# Patient Record
Sex: MALE | Race: WHITE | Employment: OTHER | URBAN - METROPOLITAN AREA
[De-identification: names, ages, dates, MRNs, and addresses within clinical notes are randomized per-mention and may not be internally consistent; named-entity substitution may affect disease eponyms.]

---

## 2017-01-06 PROBLEM — N50.819 ORCHALGIA: Status: ACTIVE | Noted: 2017-01-06

## 2017-01-16 PROBLEM — N50.819 ORCHALGIA: Status: RESOLVED | Noted: 2017-01-06 | Resolved: 2017-01-16

## 2017-02-15 ENCOUNTER — HOSPITAL ENCOUNTER (OUTPATIENT)
Dept: CV DIAGNOSTICS | Facility: HOSPITAL | Age: 67
Discharge: HOME | End: 2017-02-15
Attending: INTERNAL MEDICINE
Payer: MEDICARE

## 2017-02-15 DIAGNOSIS — R60.0 LOWER LEG EDEMA: ICD-10-CM

## 2017-02-15 PROCEDURE — 93306 TTE W/DOPPLER COMPLETE: CPT

## 2017-02-15 PROCEDURE — 93306 TTE W/DOPPLER COMPLETE: CPT | Performed by: INTERNAL MEDICINE

## 2017-03-24 PROCEDURE — 36415 COLL VENOUS BLD VENIPUNCTURE: CPT | Performed by: INTERNAL MEDICINE

## 2017-03-24 PROCEDURE — 84403 ASSAY OF TOTAL TESTOSTERONE: CPT | Performed by: INTERNAL MEDICINE

## 2017-03-24 PROCEDURE — 84402 ASSAY OF FREE TESTOSTERONE: CPT | Performed by: INTERNAL MEDICINE

## 2017-03-27 PROCEDURE — 84403 ASSAY OF TOTAL TESTOSTERONE: CPT | Performed by: INTERNAL MEDICINE

## 2017-03-27 PROCEDURE — 36415 COLL VENOUS BLD VENIPUNCTURE: CPT | Performed by: INTERNAL MEDICINE

## 2017-03-27 PROCEDURE — 84402 ASSAY OF FREE TESTOSTERONE: CPT | Performed by: INTERNAL MEDICINE

## 2017-05-19 PROBLEM — H40.003 GLAUCOMA SUSPECT, BOTH EYES: Status: ACTIVE | Noted: 2017-05-19

## 2017-05-25 PROBLEM — H40.003 GLAUCOMA SUSPECT, BILATERAL: Status: ACTIVE | Noted: 2017-05-25

## 2017-07-20 ENCOUNTER — HOSPITAL ENCOUNTER (OUTPATIENT)
Dept: ULTRASOUND IMAGING | Facility: HOSPITAL | Age: 67
Discharge: HOME | End: 2017-07-20
Attending: INTERNAL MEDICINE
Payer: MEDICARE

## 2017-07-20 DIAGNOSIS — M79.89 LEFT LEG SWELLING: ICD-10-CM

## 2017-07-20 PROCEDURE — 93971 EXTREMITY STUDY: CPT | Performed by: INTERNAL MEDICINE

## 2017-07-23 NOTE — PROGRESS NOTES
Telephone Information:  Home Phone      [phone] Notified patient of result and DR recommendations. Understanding verbalized  Asking what is next step? Still feeling leg pain and swelling, \"feverish\" however no fever.   He described it as \"slow deat

## 2017-07-25 NOTE — PROGRESS NOTES
Telephone Information:  Home Phone      [phone]  Pt notified of Dr's recommendations. Pt verbalized understanding. States he saw PT and scheduled w/PCP.

## 2017-07-27 PROCEDURE — 87046 STOOL CULTR AEROBIC BACT EA: CPT | Performed by: INTERNAL MEDICINE

## 2017-07-27 PROCEDURE — 87045 FECES CULTURE AEROBIC BACT: CPT | Performed by: INTERNAL MEDICINE

## 2017-07-27 PROCEDURE — 87493 C DIFF AMPLIFIED PROBE: CPT | Performed by: INTERNAL MEDICINE

## 2017-08-15 PROBLEM — M53.3 SACROILIAC PAIN: Status: ACTIVE | Noted: 2017-08-15

## 2017-08-17 PROCEDURE — 87493 C DIFF AMPLIFIED PROBE: CPT | Performed by: INTERNAL MEDICINE

## 2018-01-25 PROCEDURE — 88305 TISSUE EXAM BY PATHOLOGIST: CPT | Performed by: INTERNAL MEDICINE

## 2018-01-26 ENCOUNTER — APPOINTMENT (OUTPATIENT)
Dept: LAB | Facility: HOSPITAL | Age: 68
End: 2018-01-26
Attending: PHYSICIAN ASSISTANT
Payer: MEDICARE

## 2018-01-26 DIAGNOSIS — J06.9 VIRAL UPPER RESPIRATORY TRACT INFECTION: ICD-10-CM

## 2018-01-26 PROCEDURE — 87999 UNLISTED MICROBIOLOGY PX: CPT

## 2018-01-26 PROCEDURE — 87798 DETECT AGENT NOS DNA AMP: CPT

## 2018-01-26 PROCEDURE — 87502 INFLUENZA DNA AMP PROBE: CPT

## 2018-02-26 PROBLEM — M25.562 CHRONIC PAIN OF LEFT KNEE: Status: ACTIVE | Noted: 2018-02-26

## 2018-02-26 PROBLEM — G89.29 CHRONIC PAIN OF LEFT KNEE: Status: ACTIVE | Noted: 2018-02-26

## 2018-03-01 PROBLEM — H40.003 GLAUCOMA SUSPECT, BOTH EYES: Status: RESOLVED | Noted: 2017-05-19 | Resolved: 2018-03-01

## 2018-03-09 PROCEDURE — 36415 COLL VENOUS BLD VENIPUNCTURE: CPT | Performed by: INTERNAL MEDICINE

## 2018-03-09 PROCEDURE — 84402 ASSAY OF FREE TESTOSTERONE: CPT | Performed by: INTERNAL MEDICINE

## 2018-03-09 PROCEDURE — 84403 ASSAY OF TOTAL TESTOSTERONE: CPT | Performed by: INTERNAL MEDICINE

## 2018-03-12 PROCEDURE — 36415 COLL VENOUS BLD VENIPUNCTURE: CPT | Performed by: INTERNAL MEDICINE

## 2018-03-12 PROCEDURE — 84403 ASSAY OF TOTAL TESTOSTERONE: CPT | Performed by: INTERNAL MEDICINE

## 2018-03-12 PROCEDURE — 84402 ASSAY OF FREE TESTOSTERONE: CPT | Performed by: INTERNAL MEDICINE

## 2018-06-06 PROBLEM — R09.82 POST-NASAL DRIP: Status: ACTIVE | Noted: 2018-06-06

## 2018-06-06 PROBLEM — J01.21 ACUTE RECURRENT ETHMOIDAL SINUSITIS: Status: ACTIVE | Noted: 2018-06-06

## 2018-06-06 PROBLEM — R51.9 FACIAL PAIN: Status: ACTIVE | Noted: 2018-06-06

## 2018-06-11 PROCEDURE — 82175 ASSAY OF ARSENIC: CPT | Performed by: INTERNAL MEDICINE

## 2018-06-11 PROCEDURE — 36415 COLL VENOUS BLD VENIPUNCTURE: CPT | Performed by: INTERNAL MEDICINE

## 2019-02-08 PROCEDURE — 82570 ASSAY OF URINE CREATININE: CPT | Performed by: INTERNAL MEDICINE

## 2019-02-08 PROCEDURE — 84403 ASSAY OF TOTAL TESTOSTERONE: CPT | Performed by: INTERNAL MEDICINE

## 2019-02-08 PROCEDURE — 82043 UR ALBUMIN QUANTITATIVE: CPT | Performed by: INTERNAL MEDICINE

## 2019-02-08 PROCEDURE — 84402 ASSAY OF FREE TESTOSTERONE: CPT | Performed by: INTERNAL MEDICINE

## 2019-02-12 PROCEDURE — 81402 MOPATH PROCEDURE LEVEL 3: CPT | Performed by: INTERNAL MEDICINE

## 2019-02-12 PROCEDURE — 82668 ASSAY OF ERYTHROPOIETIN: CPT | Performed by: INTERNAL MEDICINE

## 2019-02-12 PROCEDURE — 81270 JAK2 GENE: CPT | Performed by: INTERNAL MEDICINE

## 2019-02-12 PROCEDURE — 81219 CALR GENE COM VARIANTS: CPT | Performed by: INTERNAL MEDICINE

## 2019-06-19 ENCOUNTER — HOSPITAL ENCOUNTER (INPATIENT)
Facility: HOSPITAL | Age: 69
LOS: 9 days | Discharge: HOME | DRG: 439 | End: 2019-06-28
Attending: EMERGENCY MEDICINE | Admitting: INTERNAL MEDICINE
Payer: MEDICARE

## 2019-06-19 DIAGNOSIS — I10 HYPERTENSION, UNSPECIFIED TYPE: ICD-10-CM

## 2019-06-19 DIAGNOSIS — E87.6 HYPOKALEMIA: ICD-10-CM

## 2019-06-19 DIAGNOSIS — K85.90 ACUTE PANCREATITIS, UNSPECIFIED COMPLICATION STATUS, UNSPECIFIED PANCREATITIS TYPE: Primary | ICD-10-CM

## 2019-06-19 DIAGNOSIS — D72.829 LEUKOCYTOSIS, UNSPECIFIED TYPE: ICD-10-CM

## 2019-06-19 PROBLEM — R73.9 HYPERGLYCEMIA: Status: ACTIVE | Noted: 2019-06-19

## 2019-06-19 LAB
ALBUMIN SERPL-MCNC: 3.8 G/DL (ref 3.4–5)
ALBUMIN/GLOB SERPL: 1.2 {RATIO} (ref 1–2)
ALP LIVER SERPL-CCNC: 83 U/L (ref 45–117)
ALT SERPL-CCNC: 46 U/L (ref 16–61)
ANION GAP SERPL CALC-SCNC: 8 MMOL/L (ref 0–18)
AST SERPL-CCNC: 19 U/L (ref 15–37)
BASOPHILS # BLD AUTO: 0.03 X10(3) UL (ref 0–0.2)
BASOPHILS NFR BLD AUTO: 0.2 %
BILIRUB SERPL-MCNC: 0.7 MG/DL (ref 0.1–2)
BILIRUB UR QL STRIP.AUTO: NEGATIVE
BILIRUB UR QL STRIP.AUTO: NEGATIVE
BUN BLD-MCNC: 10 MG/DL (ref 7–18)
BUN/CREAT SERPL: 10.9 (ref 10–20)
CALCIUM BLD-MCNC: 8.7 MG/DL (ref 8.5–10.1)
CHLORIDE SERPL-SCNC: 100 MMOL/L (ref 98–112)
CLARITY UR REFRACT.AUTO: CLEAR
CLARITY UR REFRACT.AUTO: CLEAR
CO2 SERPL-SCNC: 29 MMOL/L (ref 21–32)
CREAT BLD-MCNC: 0.92 MG/DL (ref 0.7–1.3)
DEPRECATED RDW RBC AUTO: 40.9 FL (ref 35.1–46.3)
EOSINOPHIL # BLD AUTO: 0.01 X10(3) UL (ref 0–0.7)
EOSINOPHIL NFR BLD AUTO: 0.1 %
ERYTHROCYTE [DISTWIDTH] IN BLOOD BY AUTOMATED COUNT: 13.2 % (ref 11–15)
GLOBULIN PLAS-MCNC: 3.1 G/DL (ref 2.8–4.4)
GLUCOSE BLD-MCNC: 121 MG/DL (ref 70–99)
GLUCOSE BLD-MCNC: 140 MG/DL (ref 70–99)
GLUCOSE UR STRIP.AUTO-MCNC: NEGATIVE MG/DL
GLUCOSE UR STRIP.AUTO-MCNC: NEGATIVE MG/DL
HCT VFR BLD AUTO: 47.1 % (ref 39–53)
HGB BLD-MCNC: 15.5 G/DL (ref 13–17.5)
IMM GRANULOCYTES # BLD AUTO: 0.08 X10(3) UL (ref 0–1)
IMM GRANULOCYTES NFR BLD: 0.5 %
LEUKOCYTE ESTERASE UR QL STRIP.AUTO: NEGATIVE
LEUKOCYTE ESTERASE UR QL STRIP.AUTO: NEGATIVE
LIPASE SERPL-CCNC: 494 U/L (ref 73–393)
LYMPHOCYTES # BLD AUTO: 1.06 X10(3) UL (ref 1–4)
LYMPHOCYTES NFR BLD AUTO: 6.1 %
M PROTEIN MFR SERPL ELPH: 6.9 G/DL (ref 6.4–8.2)
MCH RBC QN AUTO: 28.6 PG (ref 26–34)
MCHC RBC AUTO-ENTMCNC: 32.9 G/DL (ref 31–37)
MCV RBC AUTO: 86.9 FL (ref 80–100)
MONOCYTES # BLD AUTO: 1.44 X10(3) UL (ref 0.1–1)
MONOCYTES NFR BLD AUTO: 8.3 %
NEUTROPHILS # BLD AUTO: 14.78 X10 (3) UL (ref 1.5–7.7)
NEUTROPHILS # BLD AUTO: 14.78 X10(3) UL (ref 1.5–7.7)
NEUTROPHILS NFR BLD AUTO: 84.8 %
NITRITE UR QL STRIP.AUTO: NEGATIVE
NITRITE UR QL STRIP.AUTO: NEGATIVE
OSMOLALITY SERPL CALC.SUM OF ELEC: 285 MOSM/KG (ref 275–295)
PH UR STRIP.AUTO: 7 [PH] (ref 4.5–8)
PH UR STRIP.AUTO: 8 [PH] (ref 4.5–8)
PLATELET # BLD AUTO: 200 10(3)UL (ref 150–450)
POTASSIUM SERPL-SCNC: 3.5 MMOL/L (ref 3.5–5.1)
PROT UR STRIP.AUTO-MCNC: NEGATIVE MG/DL
PROT UR STRIP.AUTO-MCNC: NEGATIVE MG/DL
RBC # BLD AUTO: 5.42 X10(6)UL (ref 3.8–5.8)
SODIUM SERPL-SCNC: 137 MMOL/L (ref 136–145)
SP GR UR STRIP.AUTO: 1.01 (ref 1–1.03)
SP GR UR STRIP.AUTO: 1.02 (ref 1–1.03)
UROBILINOGEN UR STRIP.AUTO-MCNC: <2 MG/DL
UROBILINOGEN UR STRIP.AUTO-MCNC: <2 MG/DL
WBC # BLD AUTO: 17.4 X10(3) UL (ref 4–11)

## 2019-06-19 PROCEDURE — 99285 EMERGENCY DEPT VISIT HI MDM: CPT

## 2019-06-19 PROCEDURE — 83690 ASSAY OF LIPASE: CPT | Performed by: PHYSICIAN ASSISTANT

## 2019-06-19 PROCEDURE — 96375 TX/PRO/DX INJ NEW DRUG ADDON: CPT

## 2019-06-19 PROCEDURE — 36415 COLL VENOUS BLD VENIPUNCTURE: CPT

## 2019-06-19 PROCEDURE — 85025 COMPLETE CBC W/AUTO DIFF WBC: CPT | Performed by: PHYSICIAN ASSISTANT

## 2019-06-19 PROCEDURE — 81001 URINALYSIS AUTO W/SCOPE: CPT

## 2019-06-19 PROCEDURE — 87040 BLOOD CULTURE FOR BACTERIA: CPT | Performed by: EMERGENCY MEDICINE

## 2019-06-19 PROCEDURE — 81001 URINALYSIS AUTO W/SCOPE: CPT | Performed by: EMERGENCY MEDICINE

## 2019-06-19 PROCEDURE — 80053 COMPREHEN METABOLIC PANEL: CPT | Performed by: PHYSICIAN ASSISTANT

## 2019-06-19 PROCEDURE — 96361 HYDRATE IV INFUSION ADD-ON: CPT

## 2019-06-19 PROCEDURE — 96374 THER/PROPH/DIAG INJ IV PUSH: CPT

## 2019-06-19 PROCEDURE — 82962 GLUCOSE BLOOD TEST: CPT

## 2019-06-19 RX ORDER — METOCLOPRAMIDE HYDROCHLORIDE 5 MG/ML
10 INJECTION INTRAMUSCULAR; INTRAVENOUS EVERY 8 HOURS PRN
Status: DISCONTINUED | OUTPATIENT
Start: 2019-06-19 | End: 2019-06-28

## 2019-06-19 RX ORDER — SODIUM CHLORIDE 9 MG/ML
INJECTION, SOLUTION INTRAVENOUS CONTINUOUS
Status: ACTIVE | OUTPATIENT
Start: 2019-06-20 | End: 2019-06-20

## 2019-06-19 RX ORDER — HYDROMORPHONE HYDROCHLORIDE 1 MG/ML
0.5 INJECTION, SOLUTION INTRAMUSCULAR; INTRAVENOUS; SUBCUTANEOUS ONCE
Status: COMPLETED | OUTPATIENT
Start: 2019-06-19 | End: 2019-06-19

## 2019-06-19 RX ORDER — MORPHINE SULFATE 4 MG/ML
2 INJECTION, SOLUTION INTRAMUSCULAR; INTRAVENOUS EVERY 2 HOUR PRN
Status: DISCONTINUED | OUTPATIENT
Start: 2019-06-19 | End: 2019-06-22

## 2019-06-19 RX ORDER — HEPARIN SODIUM 5000 [USP'U]/ML
7500 INJECTION, SOLUTION INTRAVENOUS; SUBCUTANEOUS EVERY 8 HOURS SCHEDULED
Status: DISCONTINUED | OUTPATIENT
Start: 2019-06-20 | End: 2019-06-28

## 2019-06-19 RX ORDER — LABETALOL HYDROCHLORIDE 5 MG/ML
20 INJECTION, SOLUTION INTRAVENOUS ONCE
Status: COMPLETED | OUTPATIENT
Start: 2019-06-19 | End: 2019-06-19

## 2019-06-19 RX ORDER — ONDANSETRON 2 MG/ML
4 INJECTION INTRAMUSCULAR; INTRAVENOUS EVERY 6 HOURS PRN
Status: DISCONTINUED | OUTPATIENT
Start: 2019-06-19 | End: 2019-06-28

## 2019-06-19 RX ORDER — MORPHINE SULFATE 4 MG/ML
1 INJECTION, SOLUTION INTRAMUSCULAR; INTRAVENOUS EVERY 2 HOUR PRN
Status: DISCONTINUED | OUTPATIENT
Start: 2019-06-19 | End: 2019-06-22

## 2019-06-19 RX ORDER — ONDANSETRON 2 MG/ML
4 INJECTION INTRAMUSCULAR; INTRAVENOUS ONCE
Status: COMPLETED | OUTPATIENT
Start: 2019-06-19 | End: 2019-06-19

## 2019-06-19 RX ORDER — SODIUM CHLORIDE, SODIUM LACTATE, POTASSIUM CHLORIDE, CALCIUM CHLORIDE 600; 310; 30; 20 MG/100ML; MG/100ML; MG/100ML; MG/100ML
INJECTION, SOLUTION INTRAVENOUS CONTINUOUS
Status: DISCONTINUED | OUTPATIENT
Start: 2019-06-20 | End: 2019-06-23

## 2019-06-19 RX ORDER — MORPHINE SULFATE 4 MG/ML
4 INJECTION, SOLUTION INTRAMUSCULAR; INTRAVENOUS EVERY 2 HOUR PRN
Status: DISCONTINUED | OUTPATIENT
Start: 2019-06-19 | End: 2019-06-22

## 2019-06-20 ENCOUNTER — APPOINTMENT (OUTPATIENT)
Dept: MRI IMAGING | Facility: HOSPITAL | Age: 69
DRG: 439 | End: 2019-06-20
Attending: INTERNAL MEDICINE
Payer: MEDICARE

## 2019-06-20 LAB
ALBUMIN SERPL-MCNC: 3.2 G/DL (ref 3.4–5)
ALBUMIN/GLOB SERPL: 1.1 {RATIO} (ref 1–2)
ALP LIVER SERPL-CCNC: 68 U/L (ref 45–117)
ALT SERPL-CCNC: 36 U/L (ref 16–61)
ANION GAP SERPL CALC-SCNC: 6 MMOL/L (ref 0–18)
AST SERPL-CCNC: 17 U/L (ref 15–37)
BASOPHILS # BLD AUTO: 0.01 X10(3) UL (ref 0–0.2)
BASOPHILS NFR BLD AUTO: 0.1 %
BILIRUB SERPL-MCNC: 0.8 MG/DL (ref 0.1–2)
BUN BLD-MCNC: 9 MG/DL (ref 7–18)
BUN/CREAT SERPL: 9.9 (ref 10–20)
CALCIUM BLD-MCNC: 8.3 MG/DL (ref 8.5–10.1)
CHLORIDE SERPL-SCNC: 104 MMOL/L (ref 98–112)
CHOLEST SMN-MCNC: 140 MG/DL (ref ?–200)
CO2 SERPL-SCNC: 28 MMOL/L (ref 21–32)
CREAT BLD-MCNC: 0.91 MG/DL (ref 0.7–1.3)
DEPRECATED RDW RBC AUTO: 42 FL (ref 35.1–46.3)
EOSINOPHIL # BLD AUTO: 0.01 X10(3) UL (ref 0–0.7)
EOSINOPHIL NFR BLD AUTO: 0.1 %
ERYTHROCYTE [DISTWIDTH] IN BLOOD BY AUTOMATED COUNT: 13.3 % (ref 11–15)
GLOBULIN PLAS-MCNC: 2.9 G/DL (ref 2.8–4.4)
GLUCOSE BLD-MCNC: 118 MG/DL (ref 70–99)
GLUCOSE BLD-MCNC: 143 MG/DL (ref 70–99)
GLUCOSE BLD-MCNC: 143 MG/DL (ref 70–99)
GLUCOSE BLD-MCNC: 156 MG/DL (ref 70–99)
GLUCOSE BLD-MCNC: 156 MG/DL (ref 70–99)
HCT VFR BLD AUTO: 44.2 % (ref 39–53)
HDLC SERPL-MCNC: 37 MG/DL (ref 40–59)
HGB BLD-MCNC: 14.5 G/DL (ref 13–17.5)
IMM GRANULOCYTES # BLD AUTO: 0.07 X10(3) UL (ref 0–1)
IMM GRANULOCYTES NFR BLD: 0.4 %
LDLC SERPL CALC-MCNC: 88 MG/DL (ref ?–100)
LIPASE SERPL-CCNC: 306 U/L (ref 73–393)
LYMPHOCYTES # BLD AUTO: 1.11 X10(3) UL (ref 1–4)
LYMPHOCYTES NFR BLD AUTO: 6.6 %
M PROTEIN MFR SERPL ELPH: 6.1 G/DL (ref 6.4–8.2)
MCH RBC QN AUTO: 28.5 PG (ref 26–34)
MCHC RBC AUTO-ENTMCNC: 32.8 G/DL (ref 31–37)
MCV RBC AUTO: 87 FL (ref 80–100)
MONOCYTES # BLD AUTO: 1.55 X10(3) UL (ref 0.1–1)
MONOCYTES NFR BLD AUTO: 9.2 %
NEUTROPHILS # BLD AUTO: 14.09 X10 (3) UL (ref 1.5–7.7)
NEUTROPHILS # BLD AUTO: 14.09 X10(3) UL (ref 1.5–7.7)
NEUTROPHILS NFR BLD AUTO: 83.6 %
NONHDLC SERPL-MCNC: 103 MG/DL (ref ?–130)
OSMOLALITY SERPL CALC.SUM OF ELEC: 287 MOSM/KG (ref 275–295)
PLATELET # BLD AUTO: 182 10(3)UL (ref 150–450)
POTASSIUM SERPL-SCNC: 3.6 MMOL/L (ref 3.5–5.1)
POTASSIUM SERPL-SCNC: 4 MMOL/L (ref 3.5–5.1)
RBC # BLD AUTO: 5.08 X10(6)UL (ref 3.8–5.8)
SODIUM SERPL-SCNC: 138 MMOL/L (ref 136–145)
TRIGL SERPL-MCNC: 77 MG/DL (ref 30–149)
VLDLC SERPL CALC-MCNC: 15 MG/DL (ref 0–30)
WBC # BLD AUTO: 16.8 X10(3) UL (ref 4–11)

## 2019-06-20 PROCEDURE — 80053 COMPREHEN METABOLIC PANEL: CPT | Performed by: INTERNAL MEDICINE

## 2019-06-20 PROCEDURE — 83690 ASSAY OF LIPASE: CPT | Performed by: INTERNAL MEDICINE

## 2019-06-20 PROCEDURE — 74181 MRI ABDOMEN W/O CONTRAST: CPT | Performed by: INTERNAL MEDICINE

## 2019-06-20 PROCEDURE — 80061 LIPID PANEL: CPT | Performed by: INTERNAL MEDICINE

## 2019-06-20 PROCEDURE — 76376 3D RENDER W/INTRP POSTPROCES: CPT | Performed by: INTERNAL MEDICINE

## 2019-06-20 PROCEDURE — 85025 COMPLETE CBC W/AUTO DIFF WBC: CPT | Performed by: INTERNAL MEDICINE

## 2019-06-20 PROCEDURE — 82962 GLUCOSE BLOOD TEST: CPT

## 2019-06-20 PROCEDURE — 84132 ASSAY OF SERUM POTASSIUM: CPT | Performed by: HOSPITALIST

## 2019-06-20 RX ORDER — ASPIRIN 81 MG/1
81 TABLET ORAL 2 TIMES DAILY
Status: DISCONTINUED | OUTPATIENT
Start: 2019-06-20 | End: 2019-06-28

## 2019-06-20 RX ORDER — CLONIDINE HYDROCHLORIDE 0.1 MG/1
0.2 TABLET ORAL 2 TIMES DAILY
Status: DISCONTINUED | OUTPATIENT
Start: 2019-06-20 | End: 2019-06-28

## 2019-06-20 RX ORDER — DEXTROSE MONOHYDRATE 25 G/50ML
50 INJECTION, SOLUTION INTRAVENOUS
Status: DISCONTINUED | OUTPATIENT
Start: 2019-06-20 | End: 2019-06-28

## 2019-06-20 RX ORDER — POTASSIUM CHLORIDE 20 MEQ/1
40 TABLET, EXTENDED RELEASE ORAL EVERY 4 HOURS
Status: COMPLETED | OUTPATIENT
Start: 2019-06-20 | End: 2019-06-20

## 2019-06-20 RX ORDER — CARVEDILOL 12.5 MG/1
12.5 TABLET ORAL 3 TIMES DAILY
Status: DISCONTINUED | OUTPATIENT
Start: 2019-06-20 | End: 2019-06-28

## 2019-06-20 RX ORDER — ALPRAZOLAM 0.25 MG/1
0.25 TABLET ORAL 3 TIMES DAILY PRN
Status: DISCONTINUED | OUTPATIENT
Start: 2019-06-20 | End: 2019-06-28

## 2019-06-20 RX ORDER — LISINOPRIL 10 MG/1
10 TABLET ORAL NIGHTLY
Status: DISCONTINUED | OUTPATIENT
Start: 2019-06-20 | End: 2019-06-25

## 2019-06-20 RX ORDER — LISINOPRIL 20 MG/1
20 TABLET ORAL EVERY MORNING
Status: DISCONTINUED | OUTPATIENT
Start: 2019-06-20 | End: 2019-06-28

## 2019-06-20 RX ORDER — MAGNESIUM OXIDE 400 MG (241.3 MG MAGNESIUM) TABLET
400 TABLET ONCE
Status: COMPLETED | OUTPATIENT
Start: 2019-06-20 | End: 2019-06-20

## 2019-06-20 NOTE — H&P
[facility] Hospitalist H&P       CC: abdominal pain    PCP: Cierra Pinto MD    History of Present Illness: Pt is a 72 yo with mmp including but not limited to CAD, HTN/HL, asthma, GERD, is admitted for abdominal pain for past few days.   CT imagin 1/30/2015    Performed by Charyl Hatchet, MD at [address] Bilateral 5/26/2015    Performed by Eddie Mcclellan DO at [address] [facility] Bilateral 7/2007, 10/2006 20 mg in the AM = 30 mg daily (Patient taking differently: Take 20 mg by mouth every morning.  One tab in the PM - taking with 20 mg in the AM = 30 mg daily ) Disp: 90 tablet Rfl: 3   MetFORMIN HCl 500 MG Oral Tab Take 1 tablet (500 mg total) by mouth 2 (tw distended, no overt hernias   Extremities: Extremities normal, atraumatic, +2 LE edema (chronic lymphedema per pt) with venous stasis changes   Skin: Skin color, texture, turgor normal. No visible rashes  Except listed above   Neurologic:  Psychiatric: No meds    **asthma, GERD  -no acute issues, continue any home meds    **PPx-scds, heparin subq    Outpatient records or previous hospital records reviewed. Further recommendations pending patient's clinical course.   [facility] hospitalist to continue to follow

## 2019-06-20 NOTE — H&P (VIEW-ONLY)
[address] Patient Status:  Inpatient   Date of Birth 1/3/1950 MRN [id number]   [facility] 4NW-A Attending Denzel Roblero MD   Hosp Day # 1 PCP Emilio Grewal MD     Date of Co in the PM - taking with 20 mg in the AM = 30 mg daily , Start Date 12/20/18, End Date , Taking?  Yes, Authorizing Provider Timmy Medrano MD    Medication MetFORMIN HCl 500 MG Oral Tab, Sig Take 1 tablet (500 mg total) by mouth 2 (two) times daily MG Oral Tab, Sig 1/2 tablet  3 times daily prn, Start Date 12/20/18, End Date , Taking? , Authorizing Provider Felix Taylor MD    Medication Cetirizine HCl (ZYRTEC ALLERGY) 10 MG Oral Cap, Sig Take by mouth daily as needed.  , Start Date , End D HCl (REGLAN) injection 10 mg 10 mg Intravenous Q8H PRN   morphINE sulfate (PF) 4 MG/ML injection 1 mg 1 mg Intravenous Q2H PRN   Or      morphINE sulfate (PF) 4 MG/ML injection 2 mg 2 mg Intravenous Q2H PRN   Or      morphINE sulfate (PF) 4 MG/ML injection unspecified  TECHNIQUE:  Routine helical scanning was performed through the abdomen and pelvis  after  administration of iodinated contrast .  IV CONTRAST: 80 cc    Automated exposure control and ALARA manual techniques for patient specific dose reduction of the chest in one year recommended. 3. Mild pelvic lymphadenopathy. Differential diagnosis includes benign lymphoid hyperplasia and  lymphoproliferative disorders. Follow-up CT scan of the pelvis in 3 months recommended. Assessment:    1.  Mild acute

## 2019-06-20 NOTE — CONSULTS
[address] Patient Status:  Inpatient   Date of Birth 1/3/1950 MRN [id number]   [facility] 4NW-A Attending Yolanda Thomas MD   Hosp Day # 1 PCP Sunny Craig MD     Date of Co in the PM - taking with 20 mg in the AM = 30 mg daily , Start Date 12/20/18, End Date , Taking?  Yes, Authorizing Provider Lakesha Zimmer MD    Medication MetFORMIN HCl 500 MG Oral Tab, Sig Take 1 tablet (500 mg total) by mouth 2 (two) times daily MG Oral Tab, Sig 1/2 tablet  3 times daily prn, Start Date 12/20/18, End Date , Taking? , Authorizing Provider Lakesha Zimmer MD    Medication Cetirizine HCl (ZYRTEC ALLERGY) 10 MG Oral Cap, Sig Take by mouth daily as needed.  , Start Date , End D HCl (REGLAN) injection 10 mg 10 mg Intravenous Q8H PRN   morphINE sulfate (PF) 4 MG/ML injection 1 mg 1 mg Intravenous Q2H PRN   Or      morphINE sulfate (PF) 4 MG/ML injection 2 mg 2 mg Intravenous Q2H PRN   Or      morphINE sulfate (PF) 4 MG/ML injection unspecified  TECHNIQUE:  Routine helical scanning was performed through the abdomen and pelvis  after  administration of iodinated contrast .  IV CONTRAST: 80 cc    Automated exposure control and ALARA manual techniques for patient specific dose reduction of the chest in one year recommended. 3. Mild pelvic lymphadenopathy. Differential diagnosis includes benign lymphoid hyperplasia and  lymphoproliferative disorders. Follow-up CT scan of the pelvis in 3 months recommended. Assessment:    1.  Mild acute
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
## 2019-06-20 NOTE — PROGRESS NOTES
Assumed care at 0730;awake and alert x 4; seeing pt et time;new orders written;MRI screening form completed;  0856-transport here now to take him down for MRI;given morphine for c/o pain 6/10  1426-paging dr Madelyn Trejo to inform mri result  3118--

## 2019-06-20 NOTE — PROGRESS NOTES
[facility] Progress Note:  Anticoagulation Weight Dose Adjustment for heparin    Tran Ritter is a 71year old male who has been prescribed heparin for VTE prophylaxis. Estimated Creatinine Clearance: 65.9 mL/min (based on SCr of 0.92 mg/dL).     Wt

## 2019-06-20 NOTE — PROGRESS NOTES
NURSING ADMISSION NOTE      Patient admitted via Cart from ER due to pancreatitis,  Oriented to room. Safety precautions initiated. Bed in low position. Call light in reach. patient instructed strict NPO.   C/o abd.pain scale 5/10, nausea is better, abd.

## 2019-06-21 LAB
BASOPHILS # BLD AUTO: 0.02 X10(3) UL (ref 0–0.2)
BASOPHILS NFR BLD AUTO: 0.1 %
DEPRECATED RDW RBC AUTO: 43.6 FL (ref 35.1–46.3)
EOSINOPHIL # BLD AUTO: 0.05 X10(3) UL (ref 0–0.7)
EOSINOPHIL NFR BLD AUTO: 0.3 %
ERYTHROCYTE [DISTWIDTH] IN BLOOD BY AUTOMATED COUNT: 13.7 % (ref 11–15)
GLUCOSE BLD-MCNC: 129 MG/DL (ref 70–99)
GLUCOSE BLD-MCNC: 137 MG/DL (ref 70–99)
GLUCOSE BLD-MCNC: 138 MG/DL (ref 70–99)
GLUCOSE BLD-MCNC: 153 MG/DL (ref 70–99)
HCT VFR BLD AUTO: 42.9 % (ref 39–53)
HGB BLD-MCNC: 13.8 G/DL (ref 13–17.5)
IMM GRANULOCYTES # BLD AUTO: 0.1 X10(3) UL (ref 0–1)
IMM GRANULOCYTES NFR BLD: 0.6 %
LYMPHOCYTES # BLD AUTO: 0.92 X10(3) UL (ref 1–4)
LYMPHOCYTES NFR BLD AUTO: 5.9 %
MCH RBC QN AUTO: 28.3 PG (ref 26–34)
MCHC RBC AUTO-ENTMCNC: 32.2 G/DL (ref 31–37)
MCV RBC AUTO: 87.9 FL (ref 80–100)
MONOCYTES # BLD AUTO: 1.48 X10(3) UL (ref 0.1–1)
MONOCYTES NFR BLD AUTO: 9.6 %
NEUTROPHILS # BLD AUTO: 12.92 X10 (3) UL (ref 1.5–7.7)
NEUTROPHILS # BLD AUTO: 12.92 X10(3) UL (ref 1.5–7.7)
NEUTROPHILS NFR BLD AUTO: 83.5 %
PLATELET # BLD AUTO: 165 10(3)UL (ref 150–450)
RBC # BLD AUTO: 4.88 X10(6)UL (ref 3.8–5.8)
WBC # BLD AUTO: 15.5 X10(3) UL (ref 4–11)

## 2019-06-21 PROCEDURE — 85025 COMPLETE CBC W/AUTO DIFF WBC: CPT | Performed by: HOSPITALIST

## 2019-06-21 PROCEDURE — 82962 GLUCOSE BLOOD TEST: CPT

## 2019-06-21 RX ORDER — BISACODYL 10 MG
10 SUPPOSITORY, RECTAL RECTAL
Status: DISCONTINUED | OUTPATIENT
Start: 2019-06-21 | End: 2019-06-28

## 2019-06-21 RX ORDER — DOCUSATE SODIUM 100 MG/1
100 CAPSULE, LIQUID FILLED ORAL 2 TIMES DAILY
Status: DISCONTINUED | OUTPATIENT
Start: 2019-06-21 | End: 2019-06-28

## 2019-06-21 RX ORDER — POLYETHYLENE GLYCOL 3350 17 G/17G
17 POWDER, FOR SOLUTION ORAL DAILY PRN
Status: DISCONTINUED | OUTPATIENT
Start: 2019-06-21 | End: 2019-06-28

## 2019-06-21 NOTE — PLAN OF CARE
Pt. A&O x4. VSS. Pt. C/o pain; PRN morphine given per MAR. Pt. C/o nausea; PRN zofran given per [facility] with effective relief. QID accucheck; insulin given per MAR. Seizure precautions in place, no seizure activity noted. Call light within reach.  Will continue balance  Outcome: Progressing

## 2019-06-21 NOTE — PROGRESS NOTES
C/o constipation;passing gas;attempted to go 3x last nite no result;paging hospitalist to inform for orders; rounded at 0730;  Colace given as ordered;no result yet;paged hospitalist and notified;new order for miralax;given et time;

## 2019-06-21 NOTE — PROGRESS NOTES
[facility]  GI Progress Note      Elida Estrada Patient Status:  Inpatient    1/3/1950 MRN [id number]   [facility] 4NW-A Attending Maria Teresa Kiran, *   Hosp Day # 2 PCP Marie Singh MD          SUBJECTIVE:     Still hav cysts including a large exophytic right superior pole renal cyst measuring 11.6 cm. No solid renal lesions or evidence of hydronephrosis. ADRENALS:  No mass or enlargement. AORTA/VASCULAR:  No aneurysm or dissection.     RETROPERITONEUM:  No mass or ad

## 2019-06-21 NOTE — PROGRESS NOTES
[facility] Hospitalist Progress Note     PCP: Samir Anaya MD    CC:  Follow up    SUBJECTIVE:  Sitting up in bed, +epigastric pain. Tolerated diet yesterday but says pain worsened after that. No n/v/f/c.  Required IV morphine this AM    OBJECTIVE: lisinopril  20 mg Oral QAM   • Insulin Aspart Pen  1-5 Units Subcutaneous TID CC and HS   • lisinopril  10 mg Oral Nightly   • Heparin Sodium (Porcine)  7,500 Units Subcutaneous Q8H [address]     • lactated ringers 125 mL/hr at 06/21/19 0624     ALPRAZolam, gluc

## 2019-06-22 ENCOUNTER — APPOINTMENT (OUTPATIENT)
Dept: GENERAL RADIOLOGY | Facility: HOSPITAL | Age: 69
DRG: 439 | End: 2019-06-22
Attending: INTERNAL MEDICINE
Payer: MEDICARE

## 2019-06-22 LAB
BASOPHILS # BLD AUTO: 0.02 X10(3) UL (ref 0–0.2)
BASOPHILS NFR BLD AUTO: 0.2 %
DEPRECATED RDW RBC AUTO: 43.7 FL (ref 35.1–46.3)
EOSINOPHIL # BLD AUTO: 0.14 X10(3) UL (ref 0–0.7)
EOSINOPHIL NFR BLD AUTO: 1.4 %
ERYTHROCYTE [DISTWIDTH] IN BLOOD BY AUTOMATED COUNT: 13.3 % (ref 11–15)
GLUCOSE BLD-MCNC: 107 MG/DL (ref 70–99)
GLUCOSE BLD-MCNC: 109 MG/DL (ref 70–99)
GLUCOSE BLD-MCNC: 178 MG/DL (ref 70–99)
GLUCOSE BLD-MCNC: 192 MG/DL (ref 70–99)
HCT VFR BLD AUTO: 42.5 % (ref 39–53)
HGB BLD-MCNC: 13.8 G/DL (ref 13–17.5)
IMM GRANULOCYTES # BLD AUTO: 0.07 X10(3) UL (ref 0–1)
IMM GRANULOCYTES NFR BLD: 0.7 %
LYMPHOCYTES # BLD AUTO: 1.03 X10(3) UL (ref 1–4)
LYMPHOCYTES NFR BLD AUTO: 10 %
MCH RBC QN AUTO: 28.9 PG (ref 26–34)
MCHC RBC AUTO-ENTMCNC: 32.5 G/DL (ref 31–37)
MCV RBC AUTO: 89.1 FL (ref 80–100)
MONOCYTES # BLD AUTO: 1.07 X10(3) UL (ref 0.1–1)
MONOCYTES NFR BLD AUTO: 10.4 %
NEUTROPHILS # BLD AUTO: 7.99 X10 (3) UL (ref 1.5–7.7)
NEUTROPHILS # BLD AUTO: 7.99 X10(3) UL (ref 1.5–7.7)
NEUTROPHILS NFR BLD AUTO: 77.3 %
PLATELET # BLD AUTO: 179 10(3)UL (ref 150–450)
RBC # BLD AUTO: 4.77 X10(6)UL (ref 3.8–5.8)
WBC # BLD AUTO: 10.3 X10(3) UL (ref 4–11)

## 2019-06-22 PROCEDURE — 99152 MOD SED SAME PHYS/QHP 5/>YRS: CPT | Performed by: INTERNAL MEDICINE

## 2019-06-22 PROCEDURE — 82962 GLUCOSE BLOOD TEST: CPT

## 2019-06-22 PROCEDURE — 88305 TISSUE EXAM BY PATHOLOGIST: CPT | Performed by: INTERNAL MEDICINE

## 2019-06-22 PROCEDURE — 0DB68ZX EXCISION OF STOMACH, VIA NATURAL OR ARTIFICIAL OPENING ENDOSCOPIC, DIAGNOSTIC: ICD-10-PCS | Performed by: INTERNAL MEDICINE

## 2019-06-22 PROCEDURE — 74019 RADEX ABDOMEN 2 VIEWS: CPT | Performed by: INTERNAL MEDICINE

## 2019-06-22 PROCEDURE — 85025 COMPLETE CBC W/AUTO DIFF WBC: CPT | Performed by: HOSPITALIST

## 2019-06-22 RX ORDER — PANTOPRAZOLE SODIUM 40 MG/1
40 TABLET, DELAYED RELEASE ORAL
Status: DISCONTINUED | OUTPATIENT
Start: 2019-06-22 | End: 2019-06-28

## 2019-06-22 RX ORDER — MORPHINE SULFATE 4 MG/ML
4 INJECTION, SOLUTION INTRAMUSCULAR; INTRAVENOUS EVERY 2 HOUR PRN
Status: DISCONTINUED | OUTPATIENT
Start: 2019-06-22 | End: 2019-06-22

## 2019-06-22 RX ORDER — MORPHINE SULFATE 4 MG/ML
4 INJECTION, SOLUTION INTRAMUSCULAR; INTRAVENOUS EVERY 4 HOURS PRN
Status: DISCONTINUED | OUTPATIENT
Start: 2019-06-22 | End: 2019-06-23

## 2019-06-22 RX ORDER — FUROSEMIDE 20 MG/1
20 TABLET ORAL DAILY
Status: DISCONTINUED | OUTPATIENT
Start: 2019-06-23 | End: 2019-06-28

## 2019-06-22 RX ORDER — MORPHINE SULFATE 4 MG/ML
2 INJECTION, SOLUTION INTRAMUSCULAR; INTRAVENOUS EVERY 2 HOUR PRN
Status: DISCONTINUED | OUTPATIENT
Start: 2019-06-22 | End: 2019-06-22

## 2019-06-22 RX ORDER — MORPHINE SULFATE 4 MG/ML
1 INJECTION, SOLUTION INTRAMUSCULAR; INTRAVENOUS EVERY 4 HOURS PRN
Status: DISCONTINUED | OUTPATIENT
Start: 2019-06-22 | End: 2019-06-22

## 2019-06-22 RX ORDER — MORPHINE SULFATE 4 MG/ML
2 INJECTION, SOLUTION INTRAMUSCULAR; INTRAVENOUS EVERY 4 HOURS PRN
Status: DISCONTINUED | OUTPATIENT
Start: 2019-06-22 | End: 2019-06-23

## 2019-06-22 RX ORDER — MORPHINE SULFATE 4 MG/ML
1 INJECTION, SOLUTION INTRAMUSCULAR; INTRAVENOUS EVERY 4 HOURS PRN
Status: DISCONTINUED | OUTPATIENT
Start: 2019-06-22 | End: 2019-06-23

## 2019-06-22 RX ORDER — MIDAZOLAM HYDROCHLORIDE 1 MG/ML
INJECTION INTRAMUSCULAR; INTRAVENOUS
Status: DISCONTINUED | OUTPATIENT
Start: 2019-06-22 | End: 2019-06-22 | Stop reason: HOSPADM

## 2019-06-22 RX ORDER — TRAMADOL HYDROCHLORIDE 50 MG/1
50 TABLET ORAL EVERY 6 HOURS PRN
Status: DISCONTINUED | OUTPATIENT
Start: 2019-06-22 | End: 2019-06-28

## 2019-06-22 NOTE — OPERATIVE REPORT
[facility]  Esophagogastroduodenoscopy Report    Inna Harris Patient Status:  Inpatient    1/3/1950 MRN [id number]   [facility] ENDOSCOPY Attending Elvia Adorno, *      DATE OF OPERATION: 2019     PREOPERATIVE Epi Stearns histology. 2. Advance diet. 3. Begin pantoprazole. 4. He will need follow-up regarding adenopathy.

## 2019-06-22 NOTE — PLAN OF CARE
Problem: Diabetes/Glucose Control  Goal: Glucose maintained within prescribed range  Description  INTERVENTIONS:  - Monitor Blood Glucose as ordered  - Assess for signs and symptoms of hyperglycemia and hypoglycemia  - Administer ordered medications to m reach. Updated on Plan of care, voiced understanding.

## 2019-06-22 NOTE — PLAN OF CARE
Pt alert/oriented x 4. EGD done today by Dr. Jr Perez. Still having abdominal discomfort, denies n/v. IVF infusing. Dr. Delfina Hung here to see, new orders. Started PPI protonix PO and tramadol for pain.     Problem: Diabetes/Glucose Control  Goal: Glucose medications  - Provide nonpharmacologic comfort measures as appropriate  - Advance diet as tolerated, if ordered  - Obtain nutritional consult as needed  - Evaluate fluid balance  Outcome: Progressing  Tolerating soft/low fiber diet post EGD.   Abdomen roun

## 2019-06-22 NOTE — PRE-SEDATION ASSESSMENT
Physician Pre-Sedation Assessment    Pre-Sedation Assessment:     Sedation History: Previous Sedation with No Complications and Airway Assessed    Cardiac: normal S1, S2  Respiratory: breath sounds clear bilaterally   Abdomen: soft, BS (+), non-tender    A

## 2019-06-22 NOTE — PROGRESS NOTES
Pt returned from endoscopy lab EGD done this morning. Pt drowsy easily arousable to verbal to verbal stimuli. Reports abdominal discomfort, \"stabbing feeling\" to right side of abdomen 7/10, left side mild discomfort 4/10.  O2 sat 94% on room air

## 2019-06-22 NOTE — PROGRESS NOTES
[facility] Hospitalist Progress Note     PCP: Cayetano Shipman MD    CC:  Follow up    SUBJECTIVE:  Pain overall better  Did have BM this AM  EGD done this AM    OBJECTIVE:  Temp:  [98.2 °F (36.8 °C)-98.7 °F (37.1 °C)] 98.2 °F (36.8 °C)  Pulse:  [64-90] carvedilol  12.5 mg Oral TID   • cloNIDine HCl  0.2 mg Oral BID   • lisinopril  20 mg Oral QAM   • Insulin Aspart Pen  1-5 Units Subcutaneous TID CC and HS   • lisinopril  10 mg Oral Nightly   • Heparin Sodium (Porcine)  7,500 Units Subcutaneous [facility]

## 2019-06-23 LAB
ANION GAP SERPL CALC-SCNC: 7 MMOL/L (ref 0–18)
BASOPHILS # BLD AUTO: 0.03 X10(3) UL (ref 0–0.2)
BASOPHILS NFR BLD AUTO: 0.4 %
BUN BLD-MCNC: 10 MG/DL (ref 7–18)
BUN/CREAT SERPL: 12 (ref 10–20)
CALCIUM BLD-MCNC: 8.9 MG/DL (ref 8.5–10.1)
CHLORIDE SERPL-SCNC: 103 MMOL/L (ref 98–112)
CO2 SERPL-SCNC: 28 MMOL/L (ref 21–32)
CREAT BLD-MCNC: 0.83 MG/DL (ref 0.7–1.3)
DEPRECATED RDW RBC AUTO: 44 FL (ref 35.1–46.3)
EOSINOPHIL # BLD AUTO: 0.19 X10(3) UL (ref 0–0.7)
EOSINOPHIL NFR BLD AUTO: 2.6 %
ERYTHROCYTE [DISTWIDTH] IN BLOOD BY AUTOMATED COUNT: 13.5 % (ref 11–15)
GLUCOSE BLD-MCNC: 129 MG/DL (ref 70–99)
GLUCOSE BLD-MCNC: 129 MG/DL (ref 70–99)
GLUCOSE BLD-MCNC: 134 MG/DL (ref 70–99)
GLUCOSE BLD-MCNC: 142 MG/DL (ref 70–99)
GLUCOSE BLD-MCNC: 149 MG/DL (ref 70–99)
HCT VFR BLD AUTO: 44 % (ref 39–53)
HGB BLD-MCNC: 13.9 G/DL (ref 13–17.5)
IMM GRANULOCYTES # BLD AUTO: 0.06 X10(3) UL (ref 0–1)
IMM GRANULOCYTES NFR BLD: 0.8 %
LYMPHOCYTES # BLD AUTO: 1.21 X10(3) UL (ref 1–4)
LYMPHOCYTES NFR BLD AUTO: 16.3 %
MCH RBC QN AUTO: 28.4 PG (ref 26–34)
MCHC RBC AUTO-ENTMCNC: 31.6 G/DL (ref 31–37)
MCV RBC AUTO: 90 FL (ref 80–100)
MONOCYTES # BLD AUTO: 0.77 X10(3) UL (ref 0.1–1)
MONOCYTES NFR BLD AUTO: 10.4 %
NEUTROPHILS # BLD AUTO: 5.15 X10 (3) UL (ref 1.5–7.7)
NEUTROPHILS # BLD AUTO: 5.15 X10(3) UL (ref 1.5–7.7)
NEUTROPHILS NFR BLD AUTO: 69.5 %
OSMOLALITY SERPL CALC.SUM OF ELEC: 287 MOSM/KG (ref 275–295)
PLATELET # BLD AUTO: 208 10(3)UL (ref 150–450)
POTASSIUM SERPL-SCNC: 3.9 MMOL/L (ref 3.5–5.1)
RBC # BLD AUTO: 4.89 X10(6)UL (ref 3.8–5.8)
SODIUM SERPL-SCNC: 138 MMOL/L (ref 136–145)
WBC # BLD AUTO: 7.4 X10(3) UL (ref 4–11)

## 2019-06-23 PROCEDURE — 85025 COMPLETE CBC W/AUTO DIFF WBC: CPT | Performed by: INTERNAL MEDICINE

## 2019-06-23 PROCEDURE — 80048 BASIC METABOLIC PNL TOTAL CA: CPT | Performed by: INTERNAL MEDICINE

## 2019-06-23 PROCEDURE — 82962 GLUCOSE BLOOD TEST: CPT

## 2019-06-23 RX ORDER — DICYCLOMINE HYDROCHLORIDE 10 MG/1
10 CAPSULE ORAL
Status: DISCONTINUED | OUTPATIENT
Start: 2019-06-23 | End: 2019-06-26

## 2019-06-23 NOTE — PLAN OF CARE
Problem: Diabetes/Glucose Control  Goal: Glucose maintained within prescribed range  Description  INTERVENTIONS:  - Monitor Blood Glucose as ordered  - Assess for signs and symptoms of hyperglycemia and hypoglycemia  - Administer ordered medications to m light in reach.

## 2019-06-23 NOTE — PROGRESS NOTES
[facility] Hospitalist Progress Note     PCP: Charlene Reeves MD    CC:  Follow up    SUBJECTIVE:  Having bilateral sided pain  Sharp pain on right  Dull ache on left    OBJECTIVE:  Temp:  [98.2 °F (36.8 °C)-98.7 °F (37.1 °C)] 98.7 °F (37.1 °C)  Pulse: AC&HS   • Pantoprazole Sodium  40 mg Oral QAM AC   • furosemide  20 mg Oral Daily   • docusate sodium  100 mg Oral BID   • aspirin  81 mg Oral BID   • carvedilol  12.5 mg Oral TID   • cloNIDine HCl  0.2 mg Oral BID   • lisinopril  20 mg Oral QAM   • Insuli bedside.     Saad Lomax MD  [facility] Hospitalist

## 2019-06-23 NOTE — PROGRESS NOTES
[facility]  GI Progress Note      Carolee Srivastava Patient Status:  Inpatient    1/3/1950 MRN [id number]   [facility] 4NW-A Attending Josefina Rojo, *   Hosp Day # 4 PCP Kathleen Gamboa MD          SUBJECTIVE:     Still hav

## 2019-06-24 ENCOUNTER — APPOINTMENT (OUTPATIENT)
Dept: ULTRASOUND IMAGING | Facility: HOSPITAL | Age: 69
DRG: 439 | End: 2019-06-24
Attending: INTERNAL MEDICINE
Payer: MEDICARE

## 2019-06-24 ENCOUNTER — APPOINTMENT (OUTPATIENT)
Dept: NUCLEAR MEDICINE | Facility: HOSPITAL | Age: 69
End: 2019-06-24
Attending: INTERNAL MEDICINE
Payer: MEDICARE

## 2019-06-24 LAB
ALBUMIN SERPL-MCNC: 3.3 G/DL (ref 3.4–5)
ALBUMIN/GLOB SERPL: 0.8 {RATIO} (ref 1–2)
ALP LIVER SERPL-CCNC: 89 U/L (ref 45–117)
ALT SERPL-CCNC: 46 U/L (ref 16–61)
ANION GAP SERPL CALC-SCNC: 6 MMOL/L (ref 0–18)
ANION GAP SERPL CALC-SCNC: 6 MMOL/L (ref 0–18)
AST SERPL-CCNC: 24 U/L (ref 15–37)
BASOPHILS # BLD AUTO: 0.05 X10(3) UL (ref 0–0.2)
BASOPHILS NFR BLD AUTO: 0.6 %
BILIRUB SERPL-MCNC: 0.5 MG/DL (ref 0.1–2)
BUN BLD-MCNC: 12 MG/DL (ref 7–18)
BUN BLD-MCNC: 12 MG/DL (ref 7–18)
BUN/CREAT SERPL: 12.8 (ref 10–20)
BUN/CREAT SERPL: 12.8 (ref 10–20)
CALCIUM BLD-MCNC: 9.2 MG/DL (ref 8.5–10.1)
CALCIUM BLD-MCNC: 9.2 MG/DL (ref 8.5–10.1)
CHLORIDE SERPL-SCNC: 103 MMOL/L (ref 98–112)
CHLORIDE SERPL-SCNC: 103 MMOL/L (ref 98–112)
CO2 SERPL-SCNC: 29 MMOL/L (ref 21–32)
CO2 SERPL-SCNC: 29 MMOL/L (ref 21–32)
CREAT BLD-MCNC: 0.94 MG/DL (ref 0.7–1.3)
CREAT BLD-MCNC: 0.94 MG/DL (ref 0.7–1.3)
DEPRECATED HBV CORE AB SER IA-ACNC: 104.2 NG/ML (ref 30–530)
DEPRECATED RDW RBC AUTO: 43.6 FL (ref 35.1–46.3)
EOSINOPHIL # BLD AUTO: 0.22 X10(3) UL (ref 0–0.7)
EOSINOPHIL NFR BLD AUTO: 2.7 %
ERYTHROCYTE [DISTWIDTH] IN BLOOD BY AUTOMATED COUNT: 13.3 % (ref 11–15)
GLOBULIN PLAS-MCNC: 3.9 G/DL (ref 2.8–4.4)
GLUCOSE BLD-MCNC: 111 MG/DL (ref 70–99)
GLUCOSE BLD-MCNC: 130 MG/DL (ref 70–99)
GLUCOSE BLD-MCNC: 138 MG/DL (ref 70–99)
GLUCOSE BLD-MCNC: 140 MG/DL (ref 70–99)
GLUCOSE BLD-MCNC: 140 MG/DL (ref 70–99)
GLUCOSE BLD-MCNC: 144 MG/DL (ref 70–99)
GLUCOSE BLD-MCNC: 177 MG/DL (ref 70–99)
HCT VFR BLD AUTO: 47.4 % (ref 39–53)
HGB BLD-MCNC: 15.1 G/DL (ref 13–17.5)
IMM GRANULOCYTES # BLD AUTO: 0.09 X10(3) UL (ref 0–1)
IMM GRANULOCYTES NFR BLD: 1.1 %
LYMPHOCYTES # BLD AUTO: 1.45 X10(3) UL (ref 1–4)
LYMPHOCYTES NFR BLD AUTO: 17.5 %
M PROTEIN MFR SERPL ELPH: 7.2 G/DL (ref 6.4–8.2)
MCH RBC QN AUTO: 28.6 PG (ref 26–34)
MCHC RBC AUTO-ENTMCNC: 31.9 G/DL (ref 31–37)
MCV RBC AUTO: 89.8 FL (ref 80–100)
MONOCYTES # BLD AUTO: 0.88 X10(3) UL (ref 0.1–1)
MONOCYTES NFR BLD AUTO: 10.6 %
NEUTROPHILS # BLD AUTO: 5.58 X10 (3) UL (ref 1.5–7.7)
NEUTROPHILS # BLD AUTO: 5.58 X10(3) UL (ref 1.5–7.7)
NEUTROPHILS NFR BLD AUTO: 67.5 %
OSMOLALITY SERPL CALC.SUM OF ELEC: 288 MOSM/KG (ref 275–295)
OSMOLALITY SERPL CALC.SUM OF ELEC: 288 MOSM/KG (ref 275–295)
PLATELET # BLD AUTO: 243 10(3)UL (ref 150–450)
POTASSIUM SERPL-SCNC: 4.3 MMOL/L (ref 3.5–5.1)
POTASSIUM SERPL-SCNC: 4.3 MMOL/L (ref 3.5–5.1)
RBC # BLD AUTO: 5.28 X10(6)UL (ref 3.8–5.8)
SODIUM SERPL-SCNC: 138 MMOL/L (ref 136–145)
SODIUM SERPL-SCNC: 138 MMOL/L (ref 136–145)
WBC # BLD AUTO: 8.3 X10(3) UL (ref 4–11)

## 2019-06-24 PROCEDURE — 80053 COMPREHEN METABOLIC PANEL: CPT | Performed by: INTERNAL MEDICINE

## 2019-06-24 PROCEDURE — 78227 HEPATOBIL SYST IMAGE W/DRUG: CPT | Performed by: INTERNAL MEDICINE

## 2019-06-24 PROCEDURE — 82728 ASSAY OF FERRITIN: CPT | Performed by: INTERNAL MEDICINE

## 2019-06-24 PROCEDURE — 76775 US EXAM ABDO BACK WALL LIM: CPT | Performed by: INTERNAL MEDICINE

## 2019-06-24 PROCEDURE — 82962 GLUCOSE BLOOD TEST: CPT

## 2019-06-24 PROCEDURE — 85025 COMPLETE CBC W/AUTO DIFF WBC: CPT | Performed by: INTERNAL MEDICINE

## 2019-06-24 RX ORDER — TIZANIDINE 4 MG/1
4 TABLET ORAL 3 TIMES DAILY
Status: DISCONTINUED | OUTPATIENT
Start: 2019-06-24 | End: 2019-06-25

## 2019-06-24 NOTE — PROGRESS NOTES
[facility] Hospitalist Progress Note     PCP: Nallely Garcia MD    CC:  Follow up    SUBJECTIVE:  Still having some pain  Tolerating diet, having BMs    OBJECTIVE:  Temp:  [97.3 °F (36.3 °C)-98.6 °F (37 °C)] 97.3 °F (36.3 °C)  Pulse:  [] 75  Res 06/24/19  1403 06/24/19  1600   PGLU 149* 142* 111* 138* 177*            Meds:     • tiZANidine HCl  4 mg Oral TID   • dicyclomine  10 mg Oral TID AC&HS   • Pantoprazole Sodium  40 mg Oral QAM AC   • furosemide  20 mg Oral Daily   • docusate sodium  100 mg heparin subq     Dispo: Tolerating diet, trial muscle relaxer, tentative plan for d/c tomorrow    D/w JACEY Wallace and patient at bedside    Questions/concerns were discussed with patient and/or family by bedside.     Fuad Wiggins MD  [facility]

## 2019-06-24 NOTE — PLAN OF CARE
Problem: Diabetes/Glucose Control  Goal: Glucose maintained within prescribed range  Description  INTERVENTIONS:  - Monitor Blood Glucose as ordered  - Assess for signs and symptoms of hyperglycemia and hypoglycemia  - Administer ordered medications to m tomorrow. Now resting comfortably. All needs met.

## 2019-06-25 LAB
ANION GAP SERPL CALC-SCNC: 6 MMOL/L (ref 0–18)
BASOPHILS # BLD AUTO: 0.04 X10(3) UL (ref 0–0.2)
BASOPHILS NFR BLD AUTO: 0.5 %
BUN BLD-MCNC: 14 MG/DL (ref 7–18)
BUN/CREAT SERPL: 14.4 (ref 10–20)
CALCIUM BLD-MCNC: 9.1 MG/DL (ref 8.5–10.1)
CHLORIDE SERPL-SCNC: 101 MMOL/L (ref 98–112)
CO2 SERPL-SCNC: 29 MMOL/L (ref 21–32)
CREAT BLD-MCNC: 0.97 MG/DL (ref 0.7–1.3)
DEPRECATED RDW RBC AUTO: 42.7 FL (ref 35.1–46.3)
EOSINOPHIL # BLD AUTO: 0.17 X10(3) UL (ref 0–0.7)
EOSINOPHIL NFR BLD AUTO: 2.1 %
ERYTHROCYTE [DISTWIDTH] IN BLOOD BY AUTOMATED COUNT: 13.4 % (ref 11–15)
GLUCOSE BLD-MCNC: 118 MG/DL (ref 70–99)
GLUCOSE BLD-MCNC: 131 MG/DL (ref 70–99)
GLUCOSE BLD-MCNC: 131 MG/DL (ref 70–99)
GLUCOSE BLD-MCNC: 133 MG/DL (ref 70–99)
GLUCOSE BLD-MCNC: 98 MG/DL (ref 70–99)
HCT VFR BLD AUTO: 47.2 % (ref 39–53)
HGB BLD-MCNC: 15.1 G/DL (ref 13–17.5)
IMM GRANULOCYTES # BLD AUTO: 0.09 X10(3) UL (ref 0–1)
IMM GRANULOCYTES NFR BLD: 1.1 %
LYMPHOCYTES # BLD AUTO: 1.23 X10(3) UL (ref 1–4)
LYMPHOCYTES NFR BLD AUTO: 15.4 %
MCH RBC QN AUTO: 28.1 PG (ref 26–34)
MCHC RBC AUTO-ENTMCNC: 32 G/DL (ref 31–37)
MCV RBC AUTO: 87.9 FL (ref 80–100)
MONOCYTES # BLD AUTO: 0.79 X10(3) UL (ref 0.1–1)
MONOCYTES NFR BLD AUTO: 9.9 %
NEUTROPHILS # BLD AUTO: 5.66 X10 (3) UL (ref 1.5–7.7)
NEUTROPHILS # BLD AUTO: 5.66 X10(3) UL (ref 1.5–7.7)
NEUTROPHILS NFR BLD AUTO: 71 %
OSMOLALITY SERPL CALC.SUM OF ELEC: 284 MOSM/KG (ref 275–295)
PLATELET # BLD AUTO: 230 10(3)UL (ref 150–450)
POTASSIUM SERPL-SCNC: 3.7 MMOL/L (ref 3.5–5.1)
RBC # BLD AUTO: 5.37 X10(6)UL (ref 3.8–5.8)
SODIUM SERPL-SCNC: 136 MMOL/L (ref 136–145)
WBC # BLD AUTO: 8 X10(3) UL (ref 4–11)

## 2019-06-25 PROCEDURE — 82962 GLUCOSE BLOOD TEST: CPT

## 2019-06-25 PROCEDURE — 80048 BASIC METABOLIC PNL TOTAL CA: CPT | Performed by: INTERNAL MEDICINE

## 2019-06-25 PROCEDURE — 85025 COMPLETE CBC W/AUTO DIFF WBC: CPT | Performed by: INTERNAL MEDICINE

## 2019-06-25 RX ORDER — TIZANIDINE 4 MG/1
4 TABLET ORAL EVERY 6 HOURS PRN
Status: DISCONTINUED | OUTPATIENT
Start: 2019-06-25 | End: 2019-06-28

## 2019-06-25 RX ORDER — LISINOPRIL 20 MG/1
20 TABLET ORAL NIGHTLY
Status: DISCONTINUED | OUTPATIENT
Start: 2019-06-25 | End: 2019-06-28

## 2019-06-25 RX ORDER — POTASSIUM CHLORIDE 20 MEQ/1
40 TABLET, EXTENDED RELEASE ORAL ONCE
Status: COMPLETED | OUTPATIENT
Start: 2019-06-25 | End: 2019-06-25

## 2019-06-25 NOTE — PLAN OF CARE
Problem: Diabetes/Glucose Control  Goal: Glucose maintained within prescribed range  Description  INTERVENTIONS:  - Monitor Blood Glucose as ordered  - Assess for signs and symptoms of hyperglycemia and hypoglycemia  - Administer ordered medications to m monitored, no insulin given- parameters not met. Vital signs stable. Afebrile. Updated on plan of care, patient voiced understanding. Up with assist. Call light in reach.

## 2019-06-25 NOTE — CONSULTS
[facility]  Report of Consultation    Elida Estrada Patient Status:  Inpatient    1/3/1950 MRN [id number]   [facility] 4NW-A Attending Seth Braga MD   [facility] Day # 6 PCP Marie Singh MD     19    Reason for Consultati UMBILICAL W/ MESH   • KNEE TOTAL REPLACEMENT Left 10/19/2016    Performed by Teresa Tadeo MD at [address] [facility] OR   • LUMBAR FACET INJECTION OR MEDIAL [address] N/A 1/5/2015    Performed by Ludmila Ramírez MD at [facility] retention  Quaternium-15           SWELLING  Rofecoxib               DIARRHEA, NAUSEA ONLY    Comment:ANKLES SWELLING  Zithromax [Azithrom*    DIARRHEA, NAUSEA ONLY  Advair [Advair Hfa]     Runny nose    Comment:\"DIDN'T HELP\"  Amitriptyline Hcl Subcutaneous, Q8H [facility] & halfway  •  ondansetron HCl (ZOFRAN) injection 4 mg, 4 mg, Intravenous, Q6H PRN  •  Metoclopramide HCl (REGLAN) injection 10 mg, 10 mg, Intravenous, Q8H PRN    Home Medications:      No current facility-administered medications on file prior Clobetasol Propionate 0.05 % External Cream Apply 1 Application topically 2 (two) times daily.  (Patient taking differently: Apply 1 Application topically 2 (two) times daily as needed.  ) Disp: 30 g Rfl: 0   Acidophilus/Pectin (PROBIOTIC) Oral Cap Take 1 MRI ABDOMEN&MRCP W/3D (QVI=[id number]), 6/20/2019, 9:17. INDICATIONS:  right flank pain  TECHNIQUE:  Transabdominal gray scale ultrasound imaging of the bilateral kidneys and bladder was performed. Routine technique was utilized.    PATIENT STATED HISTOR consistent with acute interstitial pancreatitis. . Pancreas demonstrates normal enhancement without necrotic collections. Small juxta ampullary duodenal diverticulum.  SPLEEN:  Normal. ADRENALS:  Normal. KIDNEYS/URETERS:  Exophytic 1.9 cm cyst at the upper p including 3D multi-projection imaging. Both projection and source MRCP images are evaluated. 3-D RENDERING:  Three dimensional image processing was completed using a separate workstation under concurrent supervision. Images were archived.   PATIENT STATED was injected IV, and dynamic images of the abdomen were obtained in the anterior projection for one hour. This was followed by IV administration of CCK over 40 minutes followed by additional total of 45 minutes of dynamic anterior abdominal imaging.   RADI without sciatica     S/P total knee replacement     Bilateral leg edema     Glaucoma suspect, bilateral     Sacroiliac pain     Chronic pain of left knee     Facial pain     Post-nasal drip     Acute recurrent ethmoidal sinusitis     Hypokalemia     Leukoc

## 2019-06-25 NOTE — PROGRESS NOTES
[facility]    Progress Note    Kirill Espinal Patient Status:  Inpatient    1/3/1950 MRN [id number]   [facility] 4NW-A Attending Robin Beckford MD   [address] Day # 6 PCP Marty Donovan MD     Subjective:  Kirill Espinal is a(n 29

## 2019-06-25 NOTE — PROGRESS NOTES
[facility] Hospitalist Progress Note     PCP: Demario Fallon MD    CC:  Follow up    SUBJECTIVE:  Continues to have RUQ pain   Tolerating diet    OBJECTIVE:  Temp:  [97.3 °F (36.3 °C)-98.6 °F (37 °C)] 98.6 °F (37 °C)  Pulse:  [58-87] 58  Resp:  [17-18 06/25/19  0740   PGLU 138* 177* 144* 130* 131*            Meds:     • lisinopril  20 mg Oral Nightly   • tiZANidine HCl  4 mg Oral TID   • dicyclomine  10 mg Oral TID AC&HS   • Pantoprazole Sodium  40 mg Oral QAM AC   • furosemide  20 mg Oral Daily   • doc meds     PPx-scds, heparin subq     Dispo: Tolerating diet, plans for c-scope tomorrow    Questions/concerns were discussed with patient and/or family by bedside.     Hilda Iqbal MD  [facility]

## 2019-06-26 LAB
ANION GAP SERPL CALC-SCNC: 8 MMOL/L (ref 0–18)
BASOPHILS # BLD AUTO: 0.03 X10(3) UL (ref 0–0.2)
BASOPHILS NFR BLD AUTO: 0.4 %
BUN BLD-MCNC: 11 MG/DL (ref 7–18)
BUN/CREAT SERPL: 11.3 (ref 10–20)
CALCIUM BLD-MCNC: 8.9 MG/DL (ref 8.5–10.1)
CHLORIDE SERPL-SCNC: 101 MMOL/L (ref 98–112)
CO2 SERPL-SCNC: 30 MMOL/L (ref 21–32)
CREAT BLD-MCNC: 0.97 MG/DL (ref 0.7–1.3)
DEPRECATED RDW RBC AUTO: 42.4 FL (ref 35.1–46.3)
EOSINOPHIL # BLD AUTO: 0.16 X10(3) UL (ref 0–0.7)
EOSINOPHIL NFR BLD AUTO: 1.9 %
ERYTHROCYTE [DISTWIDTH] IN BLOOD BY AUTOMATED COUNT: 13.2 % (ref 11–15)
GLUCOSE BLD-MCNC: 121 MG/DL (ref 70–99)
GLUCOSE BLD-MCNC: 121 MG/DL (ref 70–99)
GLUCOSE BLD-MCNC: 124 MG/DL (ref 70–99)
GLUCOSE BLD-MCNC: 133 MG/DL (ref 70–99)
GLUCOSE BLD-MCNC: 173 MG/DL (ref 70–99)
HCT VFR BLD AUTO: 45.7 % (ref 39–53)
HGB BLD-MCNC: 14.8 G/DL (ref 13–17.5)
IMM GRANULOCYTES # BLD AUTO: 0.11 X10(3) UL (ref 0–1)
IMM GRANULOCYTES NFR BLD: 1.3 %
LYMPHOCYTES # BLD AUTO: 1.43 X10(3) UL (ref 1–4)
LYMPHOCYTES NFR BLD AUTO: 16.7 %
MCH RBC QN AUTO: 28.4 PG (ref 26–34)
MCHC RBC AUTO-ENTMCNC: 32.4 G/DL (ref 31–37)
MCV RBC AUTO: 87.7 FL (ref 80–100)
MONOCYTES # BLD AUTO: 0.98 X10(3) UL (ref 0.1–1)
MONOCYTES NFR BLD AUTO: 11.4 %
NEUTROPHILS # BLD AUTO: 5.85 X10 (3) UL (ref 1.5–7.7)
NEUTROPHILS # BLD AUTO: 5.85 X10(3) UL (ref 1.5–7.7)
NEUTROPHILS NFR BLD AUTO: 68.3 %
OSMOLALITY SERPL CALC.SUM OF ELEC: 289 MOSM/KG (ref 275–295)
PLATELET # BLD AUTO: 242 10(3)UL (ref 150–450)
POTASSIUM SERPL-SCNC: 3.9 MMOL/L (ref 3.5–5.1)
RBC # BLD AUTO: 5.21 X10(6)UL (ref 3.8–5.8)
SODIUM SERPL-SCNC: 139 MMOL/L (ref 136–145)
WBC # BLD AUTO: 8.6 X10(3) UL (ref 4–11)

## 2019-06-26 PROCEDURE — 82962 GLUCOSE BLOOD TEST: CPT

## 2019-06-26 PROCEDURE — 99152 MOD SED SAME PHYS/QHP 5/>YRS: CPT | Performed by: INTERNAL MEDICINE

## 2019-06-26 PROCEDURE — 0DJD8ZZ INSPECTION OF LOWER INTESTINAL TRACT, VIA NATURAL OR ARTIFICIAL OPENING ENDOSCOPIC: ICD-10-PCS | Performed by: INTERNAL MEDICINE

## 2019-06-26 PROCEDURE — 80048 BASIC METABOLIC PNL TOTAL CA: CPT | Performed by: INTERNAL MEDICINE

## 2019-06-26 PROCEDURE — 85025 COMPLETE CBC W/AUTO DIFF WBC: CPT | Performed by: INTERNAL MEDICINE

## 2019-06-26 RX ORDER — DICYCLOMINE HCL 20 MG
20 TABLET ORAL
Status: DISCONTINUED | OUTPATIENT
Start: 2019-06-26 | End: 2019-06-28

## 2019-06-26 RX ORDER — MIDAZOLAM HYDROCHLORIDE 1 MG/ML
INJECTION INTRAMUSCULAR; INTRAVENOUS
Status: DISCONTINUED | OUTPATIENT
Start: 2019-06-26 | End: 2019-06-26 | Stop reason: HOSPADM

## 2019-06-26 NOTE — PROGRESS NOTES
Full colonoscopy note dictated. In Short:    Diverticulosis otherwise normal colonoscopy. PLAN:  -  Pt to f/u with surgery as outpt to discus biliary dyskinesia.    -  With recent pancreatitis, will give a trial of pancreatic enzymes to see if this

## 2019-06-26 NOTE — PROGRESS NOTES
Patient completed Golytely prep,pale yellow liquid with no particles observed. NPO maintained. No complaint of pain at this time. Resting quietly at this time.

## 2019-06-26 NOTE — INTERVAL H&P NOTE
Pre-op Diagnosis: Abdominal pain    The above referenced H&P was reviewed by Florencio Erickson MD on 6/26/2019, the patient was examined and no significant changes have occurred in the patient's condition since the H&P was performed.   I discussed with the patient

## 2019-06-26 NOTE — PLAN OF CARE
Pt A&Ox4; up ad lennie; RA; seizure precautions- . Abdominal pain still there but improving. Colonoscopy today- diverticulosis. Surgery signed off- f/u outpatient for biliary dyskinesia. Tolerating ADA diet; still with loose BM's r/t bowel prep.   R AC P

## 2019-06-26 NOTE — DIETARY NOTE
[address] [facility]     Admitting diagnosis:  Hypokalemia [E87.6]  Leukocytosis, unspecified type [D72.829]  Acute pancreatitis, unspecified complication status, unspecified pancreatitis type [K85.90]  Hypertension, unspeci

## 2019-06-26 NOTE — PROGRESS NOTES
Pt A&Ox4; RA; up ad lennie; SL  Pt NPO at midnight for colonsocopy tomorrow- prep started at 1730; consent signed and in chart  Pt c/o diffuse abdominal pain  BLE lymphedema- pts own compression wraps used  Will continue to monitor

## 2019-06-26 NOTE — PROGRESS NOTES
[facility] Hospitalist Progress Note     PCP: Boby Carrington MD    CC:  Follow up    SUBJECTIVE:  No acute events overnight  Prepped for c-scope today   Pain is about the same    OBJECTIVE:  Temp:  [97.7 °F (36.5 °C)-99 °F (37.2 °C)] 99 °F (37.2 °C) 06/25/19  1158 06/25/19  1609 06/25/19  2123 06/26/19  0829   PGLU 131* 131* 98 118* 121*       Meds:     • lisinopril  20 mg Oral Nightly   • dicyclomine  10 mg Oral TID AC&HS   • Pantoprazole Sodium  40 mg Oral QAM AC   • furosemide  20 mg Oral Daily   • asthma, GERD  -no acute issues, continue any home meds     PPx-scds, heparin subq     Dispo: Tolerating diet, plans for c-scope this afternoon    Questions/concerns were discussed with patient and/or family by bedside.     Claudine Carrero MD  [facility]

## 2019-06-26 NOTE — OPERATIVE REPORT
[facility]                                                                                              Colonoscopy Operative Report    Hue Erika Patient Status:  Inpatient    1/3/1950 patient was transferred to the recovery area in stable condition. Quality of Preparation: Adequate  Aronchick Bowel Prep Scale:  1 - excellent  Findings:   Sigmoid diverticulosis  Hemorrhoids  Normal terminal ileum.     Recommendations:   Repeat colonoscop

## 2019-06-27 LAB
ANION GAP SERPL CALC-SCNC: 9 MMOL/L (ref 0–18)
BASOPHILS # BLD AUTO: 0.03 X10(3) UL (ref 0–0.2)
BASOPHILS NFR BLD AUTO: 0.2 %
BUN BLD-MCNC: 21 MG/DL (ref 7–18)
BUN/CREAT SERPL: 13.7 (ref 10–20)
CALCIUM BLD-MCNC: 8.8 MG/DL (ref 8.5–10.1)
CHLORIDE SERPL-SCNC: 96 MMOL/L (ref 98–112)
CO2 SERPL-SCNC: 28 MMOL/L (ref 21–32)
CREAT BLD-MCNC: 1.53 MG/DL (ref 0.7–1.3)
DEPRECATED RDW RBC AUTO: 42.6 FL (ref 35.1–46.3)
EOSINOPHIL # BLD AUTO: 0.01 X10(3) UL (ref 0–0.7)
EOSINOPHIL NFR BLD AUTO: 0.1 %
ERYTHROCYTE [DISTWIDTH] IN BLOOD BY AUTOMATED COUNT: 13.4 % (ref 11–15)
GLUCOSE BLD-MCNC: 109 MG/DL (ref 70–99)
GLUCOSE BLD-MCNC: 119 MG/DL (ref 70–99)
GLUCOSE BLD-MCNC: 120 MG/DL (ref 70–99)
GLUCOSE BLD-MCNC: 125 MG/DL (ref 70–99)
GLUCOSE BLD-MCNC: 160 MG/DL (ref 70–99)
HCT VFR BLD AUTO: 44.6 % (ref 39–53)
HGB BLD-MCNC: 14.6 G/DL (ref 13–17.5)
IMM GRANULOCYTES # BLD AUTO: 0.19 X10(3) UL (ref 0–1)
IMM GRANULOCYTES NFR BLD: 1 %
LYMPHOCYTES # BLD AUTO: 0.55 X10(3) UL (ref 1–4)
LYMPHOCYTES NFR BLD AUTO: 3 %
MCH RBC QN AUTO: 28.5 PG (ref 26–34)
MCHC RBC AUTO-ENTMCNC: 32.7 G/DL (ref 31–37)
MCV RBC AUTO: 86.9 FL (ref 80–100)
MONOCYTES # BLD AUTO: 0.45 X10(3) UL (ref 0.1–1)
MONOCYTES NFR BLD AUTO: 2.5 %
NEUTROPHILS # BLD AUTO: 17.13 X10 (3) UL (ref 1.5–7.7)
NEUTROPHILS # BLD AUTO: 17.13 X10(3) UL (ref 1.5–7.7)
NEUTROPHILS NFR BLD AUTO: 93.2 %
OSMOLALITY SERPL CALC.SUM OF ELEC: 280 MOSM/KG (ref 275–295)
PLATELET # BLD AUTO: 219 10(3)UL (ref 150–450)
POTASSIUM SERPL-SCNC: 3.8 MMOL/L (ref 3.5–5.1)
RBC # BLD AUTO: 5.13 X10(6)UL (ref 3.8–5.8)
SODIUM SERPL-SCNC: 133 MMOL/L (ref 136–145)
WBC # BLD AUTO: 18.4 X10(3) UL (ref 4–11)

## 2019-06-27 PROCEDURE — 82962 GLUCOSE BLOOD TEST: CPT

## 2019-06-27 PROCEDURE — 85025 COMPLETE CBC W/AUTO DIFF WBC: CPT | Performed by: INTERNAL MEDICINE

## 2019-06-27 PROCEDURE — 80048 BASIC METABOLIC PNL TOTAL CA: CPT | Performed by: INTERNAL MEDICINE

## 2019-06-27 NOTE — PLAN OF CARE
AT&Ox4; up ad lennie; RA; - seizure preacautions. Still with soft BM's after colonoscopy yesterday. Urine retention today- bladder scan < 100. MD aware;  finally voided at 1830 clear/yellow urine. Pt continues to c/o LLQ pain and now right flank pain.  Patti Figueroa

## 2019-06-27 NOTE — PROGRESS NOTES
[facility]    Progress Note    Annabel Cheney Patient Status:  Inpatient    1/3/1950 MRN [id number]   [facility] 4NW-A Attending Avtar Zepeda MD   [facility] Day # 8 PCP Rao Nolen MD     Subjective:  Annabel Cheney is a(n) dicyclomine 20 mg qid  No further in pt GI plans. Pt to r/u with surgery as outpt for considerations of outpt lap elkin for biliary dyskinesia as a source for his 13 years of RUQ pain.   Will sign off from a GI standpoint.     Lexii Chery  6/27/2019  2:35 PM

## 2019-06-27 NOTE — PLAN OF CARE
Problem: Diabetes/Glucose Control  Goal: Glucose maintained within prescribed range  Description  INTERVENTIONS:  - Monitor Blood Glucose as ordered  - Assess for signs and symptoms of hyperglycemia and hypoglycemia  - Administer ordered medications to m stable. Afebrile. Call light in reach.

## 2019-06-28 VITALS
DIASTOLIC BLOOD PRESSURE: 70 MMHG | BODY MASS INDEX: 45 KG/M2 | HEART RATE: 65 BPM | TEMPERATURE: 98 F | WEIGHT: 268.19 LBS | SYSTOLIC BLOOD PRESSURE: 132 MMHG | OXYGEN SATURATION: 92 % | RESPIRATION RATE: 18 BRPM

## 2019-06-28 LAB
ALBUMIN SERPL-MCNC: 3.2 G/DL (ref 3.4–5)
ALP LIVER SERPL-CCNC: 85 U/L (ref 45–117)
ALT SERPL-CCNC: 56 U/L (ref 16–61)
ANION GAP SERPL CALC-SCNC: 8 MMOL/L (ref 0–18)
AST SERPL-CCNC: 32 U/L (ref 15–37)
BILIRUB DIRECT SERPL-MCNC: 0.2 MG/DL (ref 0–0.2)
BILIRUB SERPL-MCNC: 0.6 MG/DL (ref 0.1–2)
BUN BLD-MCNC: 26 MG/DL (ref 7–18)
BUN/CREAT SERPL: 20.6 (ref 10–20)
CALCIUM BLD-MCNC: 8.6 MG/DL (ref 8.5–10.1)
CHLORIDE SERPL-SCNC: 101 MMOL/L (ref 98–112)
CO2 SERPL-SCNC: 28 MMOL/L (ref 21–32)
CREAT BLD-MCNC: 1.26 MG/DL (ref 0.7–1.3)
DEPRECATED RDW RBC AUTO: 42.5 FL (ref 35.1–46.3)
ERYTHROCYTE [DISTWIDTH] IN BLOOD BY AUTOMATED COUNT: 13.4 % (ref 11–15)
GLUCOSE BLD-MCNC: 127 MG/DL (ref 70–99)
GLUCOSE BLD-MCNC: 129 MG/DL (ref 70–99)
GLUCOSE BLD-MCNC: 167 MG/DL (ref 70–99)
HAV IGM SER QL: 2.3 MG/DL (ref 1.6–2.6)
HCT VFR BLD AUTO: 44.6 % (ref 39–53)
HGB BLD-MCNC: 14.2 G/DL (ref 13–17.5)
M PROTEIN MFR SERPL ELPH: 6.6 G/DL (ref 6.4–8.2)
MCH RBC QN AUTO: 27.8 PG (ref 26–34)
MCHC RBC AUTO-ENTMCNC: 31.8 G/DL (ref 31–37)
MCV RBC AUTO: 87.3 FL (ref 80–100)
OSMOLALITY SERPL CALC.SUM OF ELEC: 290 MOSM/KG (ref 275–295)
PLATELET # BLD AUTO: 185 10(3)UL (ref 150–450)
POTASSIUM SERPL-SCNC: 3.7 MMOL/L (ref 3.5–5.1)
RBC # BLD AUTO: 5.11 X10(6)UL (ref 3.8–5.8)
SODIUM SERPL-SCNC: 137 MMOL/L (ref 136–145)
WBC # BLD AUTO: 9.4 X10(3) UL (ref 4–11)

## 2019-06-28 PROCEDURE — 80048 BASIC METABOLIC PNL TOTAL CA: CPT | Performed by: INTERNAL MEDICINE

## 2019-06-28 PROCEDURE — 82962 GLUCOSE BLOOD TEST: CPT

## 2019-06-28 PROCEDURE — 83735 ASSAY OF MAGNESIUM: CPT | Performed by: INTERNAL MEDICINE

## 2019-06-28 PROCEDURE — 85027 COMPLETE CBC AUTOMATED: CPT | Performed by: INTERNAL MEDICINE

## 2019-06-28 PROCEDURE — 80076 HEPATIC FUNCTION PANEL: CPT | Performed by: INTERNAL MEDICINE

## 2019-06-28 RX ORDER — PANTOPRAZOLE SODIUM 40 MG/1
40 TABLET, DELAYED RELEASE ORAL
Qty: 30 TABLET | Refills: 0 | Status: SHIPPED | OUTPATIENT
Start: 2019-06-29 | End: 2019-09-20 | Stop reason: ALTCHOICE

## 2019-06-28 RX ORDER — DICYCLOMINE HCL 20 MG
20 TABLET ORAL
Qty: 30 TABLET | Refills: 0 | Status: SHIPPED | OUTPATIENT
Start: 2019-06-28 | End: 2019-09-20 | Stop reason: ALTCHOICE

## 2019-06-28 RX ORDER — POTASSIUM CHLORIDE 20 MEQ/1
40 TABLET, EXTENDED RELEASE ORAL ONCE
Status: COMPLETED | OUTPATIENT
Start: 2019-06-28 | End: 2019-06-28

## 2019-06-28 NOTE — PLAN OF CARE
Pt. A&O x4. VSS. No c/o pain overnight. Pt. Voiding adequate amounts of clear yellow urine overnight. Seizure precautions in place; no seizure activity noted. QID accuchecks; insulin given per MAR. Call light within reach. Will continue to monitor.      Pro Progressing

## 2019-06-28 NOTE — DISCHARGE SUMMARY
[facility] Internal Medicine Discharge Summary   Patient ID:  Gerhardt Angelica  [id number]  4 year old  1/3/1950    Admit date: 6/19/2019    Discharge date and time: 6/28/2019     Attending Physician: Vahid Croft    Primary Care Physician: Echo Osborne # Oliguiria, acute renal failure  -pt reports good PO intake  -bladder compressed from abd distention? Difficult to get good read on bladder scan?  Will give trial IVF bolus and follow  -cr 1.53 to 1.26, urinating improved 6/28     # Incidental finding of p What changed:    · how much to take  · how to take this  · when to take this  · additional instructions     * lisinopril 20 MG Tabs  Commonly known as:  PRINIVIL,ZESTRIL  TAKE 1 TABLET BY MOUTH TWICE DAILY  What changed:    · how much to take  · how to jan * This list has 2 medication(s) that are the same as other medications prescribed for you. Read the directions carefully, and ask your doctor or other care provider to review them with you.                Where to Get Your Medications      These medication Result Date: 6/19/2019  DATE OF SERVICE: 06.19.2019 CT ABDOMEN+PELVIS(CONTRAST ONLY)(CPT=74177) CLINICAL INDICATION:  Lower abdominal pain, unspecified TECHNIQUE:  Routine helical scanning was performed through the abdomen and pelvis  after administration IMPRESSION: 1. Peripancreatic edema without necrotic collections consistent with mild acute interstitial pancreatitis. 2. Incidental lingular pulmonary nodule. Follow-up CT scan of the chest in one year recommended. 3. Mild pelvic lymphadenopathy.  Differen PROCEDURE:  MRI ABDOMEN&MRCP W/3D (HUG=72384/85577)  COMPARISON:  None. INDICATIONS:  pancreatitis  TECHNIQUE:    Multiplanar single shot fast spin echo, chemical shift imaging, breath hold T2 weighted images and 2-D fiesta sequences were acquired.  No con CONCLUSION:  1. Diffuse fatty infiltration of the liver/steatosis without focal hepatic lesions. 2. No biliary ductal dilatation or pancreatic duct dilatation identified. No biliary disease is appreciated.  3. Multiple bilateral renal cysts including a lar PROCEDURE:  XR ABDOMEN OBSTRUCTIVE SERIES ROUTINE(2 VW)(CPT=74019)  TECHNIQUE:  2 view obstructive series of the abdomen and pelvis were obtained. COMPARISON:  None.   INDICATIONS:  abdominal pain, constipaiton, abdominal distention  PATIENT STATED HISTORY

## 2019-07-23 RX ORDER — SODIUM CHLORIDE, SODIUM LACTATE, POTASSIUM CHLORIDE, CALCIUM CHLORIDE 600; 310; 30; 20 MG/100ML; MG/100ML; MG/100ML; MG/100ML
INJECTION, SOLUTION INTRAVENOUS CONTINUOUS
Status: CANCELLED | OUTPATIENT
Start: 2019-07-23

## 2019-08-13 ENCOUNTER — ANESTHESIA EVENT (OUTPATIENT)
Dept: ENDOSCOPY | Facility: HOSPITAL | Age: 69
End: 2019-08-13

## 2019-08-13 ENCOUNTER — HOSPITAL ENCOUNTER (OUTPATIENT)
Facility: HOSPITAL | Age: 69
Setting detail: HOSPITAL OUTPATIENT SURGERY
Discharge: HOME | End: 2019-08-13
Attending: INTERNAL MEDICINE | Admitting: INTERNAL MEDICINE
Payer: MEDICARE

## 2019-08-13 ENCOUNTER — ANESTHESIA (OUTPATIENT)
Dept: ENDOSCOPY | Facility: HOSPITAL | Age: 69
End: 2019-08-13

## 2019-08-13 VITALS
RESPIRATION RATE: 20 BRPM | WEIGHT: 270 LBS | HEIGHT: 65 IN | BODY MASS INDEX: 44.98 KG/M2 | DIASTOLIC BLOOD PRESSURE: 53 MMHG | TEMPERATURE: 98 F | SYSTOLIC BLOOD PRESSURE: 137 MMHG | OXYGEN SATURATION: 97 % | HEART RATE: 72 BPM

## 2019-08-13 DIAGNOSIS — K85.90 ACUTE PANCREATITIS, UNSPECIFIED COMPLICATION STATUS, UNSPECIFIED PANCREATITIS TYPE: ICD-10-CM

## 2019-08-13 LAB — GLUCOSE BLD-MCNC: 102 MG/DL (ref 70–99)

## 2019-08-13 PROCEDURE — 0DJ08ZZ INSPECTION OF UPPER INTESTINAL TRACT, VIA NATURAL OR ARTIFICIAL OPENING ENDOSCOPIC: ICD-10-PCS | Performed by: INTERNAL MEDICINE

## 2019-08-13 PROCEDURE — 82962 GLUCOSE BLOOD TEST: CPT

## 2019-08-13 RX ORDER — DEXTROSE MONOHYDRATE 25 G/50ML
50 INJECTION, SOLUTION INTRAVENOUS
Status: DISCONTINUED | OUTPATIENT
Start: 2019-08-13 | End: 2019-08-14

## 2019-08-13 RX ORDER — SODIUM CHLORIDE, SODIUM LACTATE, POTASSIUM CHLORIDE, CALCIUM CHLORIDE 600; 310; 30; 20 MG/100ML; MG/100ML; MG/100ML; MG/100ML
INJECTION, SOLUTION INTRAVENOUS CONTINUOUS
Status: CANCELLED | OUTPATIENT
Start: 2019-08-13

## 2019-08-13 RX ORDER — DEXTROSE MONOHYDRATE 25 G/50ML
50 INJECTION, SOLUTION INTRAVENOUS
Status: CANCELLED | OUTPATIENT
Start: 2019-08-13

## 2019-08-13 RX ORDER — SODIUM CHLORIDE, SODIUM LACTATE, POTASSIUM CHLORIDE, CALCIUM CHLORIDE 600; 310; 30; 20 MG/100ML; MG/100ML; MG/100ML; MG/100ML
INJECTION, SOLUTION INTRAVENOUS CONTINUOUS
Status: DISCONTINUED | OUTPATIENT
Start: 2019-08-13 | End: 2019-08-14

## 2019-08-13 RX ORDER — NALOXONE HYDROCHLORIDE 0.4 MG/ML
80 INJECTION, SOLUTION INTRAMUSCULAR; INTRAVENOUS; SUBCUTANEOUS AS NEEDED
Status: CANCELLED | OUTPATIENT
Start: 2019-08-13 | End: 2019-08-14

## 2019-08-13 NOTE — OPERATIVE REPORT
OPERATIVE REPORT   PATIENT NAME: Leandro Harrell  MRN: [id number]  DATE OF OPERATION: 8/13/2019  PREOPERATIVE DIAGNOSIS:   1. Acute interstitial pancreatitis of unclear etiology. POSTOPERATIVE DIAGNOSES:  1.  Fatty liver otherwise normal endoscopic ultrasound o

## 2019-08-13 NOTE — ANESTHESIA PREPROCEDURE EVALUATION
PRE-OP EVALUATION    Patient Name: Kymberly Tolliver    Pre-op Diagnosis: Acute pancreatitis, unspecified complication status, unspecified pancreatitis type [K85.90]    Procedure(s):      Surgeon(s) and Role:     Michaela Roberson MD - Primary    Pre-op vitals LISINOPRIL 20 MG Oral Tab TAKE 1 TABLET BY MOUTH TWICE DAILY (Patient taking differently: 10 mg aT NIGHT) Disp: 90 tablet Rfl: 1   Halobetasol Propionate 0.05 % External Ointment Apply to AAs of hands BID x 2 weeks, then hold for 2 weeks. Repeat PRN.  Disp: Past Surgical History:   Procedure Laterality Date   • APPENDECTOMY     • APPENDECTOMY  1967    • COLONOSCOPY      X9   • COLONOSCOPY N/A 6/26/2019    Performed by Benjamin Vale MD at [facility] ENDOSCOPY   • COLONOSCOPY, POSSIBLE BIOPSY, POSSIBLE POLYPECTOMY 59278 Available pre-op labs reviewed.   Lab Results   Component Value Date    WBC 8.01 08/12/2019    RBC 5.37 08/12/2019    HGB 14.9 08/12/2019    HCT 46.1 08/12/2019    MCV 85.8 08/12/2019    MCH 27.7 08/12/2019    MCHC 32.3 08/12/2019    RDW 14.3 08/12/2019

## 2019-08-13 NOTE — ANESTHESIA POSTPROCEDURE EVALUATION
[address] Patient Status:  Hospital Outpatient Surgery   Age/Gender 71year old male MRN [id number]   Location [address] [facility]. Attending Janell Nash MD   Hosp Day # 0 PCP Kathleen Gamboa MD       Anesthesia Post-o

## 2019-09-26 NOTE — H&P (VIEW-ONLY)
9/26/2019    Patient presents with:  Consult: Ref: Dr. Rebeca Kaufman enlarged pelvic lymph nodes      HPI:    Sam [place] is a 71year old male who presents for a enlarged lymph node evaluation  He was found to have a persistently enlarged left inguinal lymph no MANAGEMENT   • LUMBAR RADIOFREQUENCY Bilateral 1/30/2015    Performed by Charyl Hatchet, MD at [address] Bilateral 5/26/2015    Performed by Eddie Mcclellan DO at [facility] MAIN OR   • JERED mouth daily. (Patient taking differently: Take 20 mg by mouth daily as needed.  ) Disp: 30 tablet Rfl: 1   Clobetasol Propionate 0.05 % External Cream Apply 1 Application topically 2 (two) times daily.  (Patient taking differently: Apply 1 Application topic frequency: Never      Comment: Social    Drug use: No      ROS:    10 point review performed with pertinent positives and negatives per HPI    EXAM:    GENERAL: well developed, well nourished male, in no apparent distress  SKIN: anicteric  HEENT: normoceph calcification along its wall is unchanged as well. There is also a 1.9 cm exophytic simple cyst in the left kidney superior pole unchanged. A few additional subcentimeter hypodensities too small to further characterize in the right kidney are unchanged.  Yesi Bhardwaj

## 2019-10-01 PROBLEM — M54.2 NECK PAIN: Status: ACTIVE | Noted: 2019-10-01

## 2019-10-06 ENCOUNTER — ANESTHESIA EVENT (OUTPATIENT)
Dept: SURGERY | Facility: HOSPITAL | Age: 69
End: 2019-10-06

## 2019-10-07 ENCOUNTER — HOSPITAL ENCOUNTER (OUTPATIENT)
Facility: HOSPITAL | Age: 69
Setting detail: HOSPITAL OUTPATIENT SURGERY
Discharge: HOME | End: 2019-10-07
Attending: SURGERY | Admitting: SURGERY
Payer: MEDICARE

## 2019-10-07 ENCOUNTER — ANESTHESIA (OUTPATIENT)
Dept: SURGERY | Facility: HOSPITAL | Age: 69
End: 2019-10-07

## 2019-10-07 VITALS
OXYGEN SATURATION: 96 % | WEIGHT: 271.19 LBS | DIASTOLIC BLOOD PRESSURE: 74 MMHG | TEMPERATURE: 98 F | BODY MASS INDEX: 45.18 KG/M2 | HEIGHT: 65 IN | HEART RATE: 69 BPM | SYSTOLIC BLOOD PRESSURE: 130 MMHG | RESPIRATION RATE: 20 BRPM

## 2019-10-07 DIAGNOSIS — R59.9 ENLARGED LYMPH NODE: ICD-10-CM

## 2019-10-07 PROCEDURE — 88307 TISSUE EXAM BY PATHOLOGIST: CPT | Performed by: SURGERY

## 2019-10-07 PROCEDURE — 88184 FLOWCYTOMETRY/ TC 1 MARKER: CPT | Performed by: SURGERY

## 2019-10-07 PROCEDURE — 82962 GLUCOSE BLOOD TEST: CPT

## 2019-10-07 PROCEDURE — 88185 FLOWCYTOMETRY/TC ADD-ON: CPT | Performed by: SURGERY

## 2019-10-07 PROCEDURE — 88341 IMHCHEM/IMCYTCHM EA ADD ANTB: CPT | Performed by: SURGERY

## 2019-10-07 PROCEDURE — 07BJ0ZZ EXCISION OF LEFT INGUINAL LYMPHATIC, OPEN APPROACH: ICD-10-PCS | Performed by: SURGERY

## 2019-10-07 PROCEDURE — 88333 PATH CONSLTJ SURG CYTO XM 1: CPT | Performed by: SURGERY

## 2019-10-07 PROCEDURE — 88342 IMHCHEM/IMCYTCHM 1ST ANTB: CPT | Performed by: SURGERY

## 2019-10-07 RX ORDER — DEXTROSE MONOHYDRATE 25 G/50ML
50 INJECTION, SOLUTION INTRAVENOUS
Status: DISCONTINUED | OUTPATIENT
Start: 2019-10-07 | End: 2019-10-07 | Stop reason: HOSPADM

## 2019-10-07 RX ORDER — DIPHENHYDRAMINE HYDROCHLORIDE 50 MG/ML
12.5 INJECTION INTRAMUSCULAR; INTRAVENOUS AS NEEDED
Status: DISCONTINUED | OUTPATIENT
Start: 2019-10-07 | End: 2019-10-07

## 2019-10-07 RX ORDER — DEXTROSE MONOHYDRATE 25 G/50ML
50 INJECTION, SOLUTION INTRAVENOUS
Status: DISCONTINUED | OUTPATIENT
Start: 2019-10-07 | End: 2019-10-07

## 2019-10-07 RX ORDER — METOCLOPRAMIDE HYDROCHLORIDE 5 MG/ML
10 INJECTION INTRAMUSCULAR; INTRAVENOUS AS NEEDED
Status: DISCONTINUED | OUTPATIENT
Start: 2019-10-07 | End: 2019-10-07

## 2019-10-07 RX ORDER — NALOXONE HYDROCHLORIDE 0.4 MG/ML
80 INJECTION, SOLUTION INTRAMUSCULAR; INTRAVENOUS; SUBCUTANEOUS AS NEEDED
Status: DISCONTINUED | OUTPATIENT
Start: 2019-10-07 | End: 2019-10-07

## 2019-10-07 RX ORDER — HYDROMORPHONE HYDROCHLORIDE 1 MG/ML
0.4 INJECTION, SOLUTION INTRAMUSCULAR; INTRAVENOUS; SUBCUTANEOUS EVERY 5 MIN PRN
Status: DISCONTINUED | OUTPATIENT
Start: 2019-10-07 | End: 2019-10-07

## 2019-10-07 RX ORDER — SODIUM CHLORIDE, SODIUM LACTATE, POTASSIUM CHLORIDE, CALCIUM CHLORIDE 600; 310; 30; 20 MG/100ML; MG/100ML; MG/100ML; MG/100ML
INJECTION, SOLUTION INTRAVENOUS CONTINUOUS
Status: DISCONTINUED | OUTPATIENT
Start: 2019-10-07 | End: 2019-10-07

## 2019-10-07 RX ORDER — LIDOCAINE HYDROCHLORIDE AND EPINEPHRINE 10; 10 MG/ML; UG/ML
INJECTION, SOLUTION INFILTRATION; PERINEURAL AS NEEDED
Status: DISCONTINUED | OUTPATIENT
Start: 2019-10-07 | End: 2019-10-07 | Stop reason: HOSPADM

## 2019-10-07 RX ORDER — TRAMADOL HYDROCHLORIDE 50 MG/1
50 TABLET ORAL EVERY 6 HOURS PRN
Qty: 20 TABLET | Refills: 1 | Status: SHIPPED | OUTPATIENT
Start: 2019-10-07 | End: 2019-11-21

## 2019-10-07 RX ORDER — ONDANSETRON 2 MG/ML
4 INJECTION INTRAMUSCULAR; INTRAVENOUS AS NEEDED
Status: DISCONTINUED | OUTPATIENT
Start: 2019-10-07 | End: 2019-10-07

## 2019-10-07 RX ORDER — LABETALOL HYDROCHLORIDE 5 MG/ML
5 INJECTION, SOLUTION INTRAVENOUS EVERY 5 MIN PRN
Status: DISCONTINUED | OUTPATIENT
Start: 2019-10-07 | End: 2019-10-07

## 2019-10-07 RX ORDER — CLINDAMYCIN PHOSPHATE 900 MG/50ML
900 INJECTION INTRAVENOUS ONCE
Status: COMPLETED | OUTPATIENT
Start: 2019-10-07 | End: 2019-10-07

## 2019-10-07 RX ORDER — BUPIVACAINE HYDROCHLORIDE 5 MG/ML
INJECTION, SOLUTION EPIDURAL; INTRACAUDAL AS NEEDED
Status: DISCONTINUED | OUTPATIENT
Start: 2019-10-07 | End: 2019-10-07 | Stop reason: HOSPADM

## 2019-10-07 NOTE — ANESTHESIA POSTPROCEDURE EVALUATION
[address] Patient Status:  Hospital Outpatient Surgery   Age/Gender 71year old male MRN [id number]   Location [address] [facility] Attending Car Clark MD   [address] Day # 0 PCP Yaquelin Wilcox MD       An

## 2019-10-07 NOTE — OPERATIVE REPORT
[facility]    PATIENT'S NAME: lFo Henao   ATTENDING PHYSICIAN: Emily Cedillo M.D. OPERATING PHYSICIAN: Emily Cedillo M.D.    PATIENT ACCOUNT#:   [de-identified]    LOCATION:  PACU [facility] PACU 4 EDWP 10  MEDICAL RECORD #:   [id number]       DATE OF BIR

## 2019-10-07 NOTE — ANESTHESIA PREPROCEDURE EVALUATION
PRE-OP EVALUATION    Patient Name: Carolee Srivastava    Pre-op Diagnosis: Enlarged lymph node [R59.9]    Procedure(s):  EXCISION LEFT INGUINAL LYMPH NODE    Surgeon(s) and Role:     Harry Bolaños MD - Primary    Pre-op vitals reviewed.   Temp: 97.8 °F (36.6 [DISCONTINUED] metFORMIN HCl 500 MG Oral Tab Take 500 mg by mouth 2 (two) times daily with meals. Disp:  Rfl:    hydrALAzine HCl 50 MG Oral Tab Take 1 tablet (50 mg total) by mouth 3 (three) times daily.  Disp: 270 tablet Rfl: 3   ERGOCALCIFEROL 47691 units Endo/Other      (+) diabetes  type 2, not using insulin                  (+) arthritis       Pulmonary      (+) asthma         (-) shortness of breath  (-) recent URI          Neuro/Psych        (+) anxiety       (+) seizures                     Past Surg Performed by Octavio Dawson MD at [address] [place] [address]   • TONSILLECTOMY     • TOTAL KNEE REPLACEMENT Left      Social History    Tobacco Use      Smoking status: Never Smoker      Smokeless tobacco: Never Used    Alcohol use:  Yes      Alcohol/

## 2019-10-07 NOTE — BRIEF OP NOTE
Pre-Operative Diagnosis: Enlarged lymph node [R59.9]     Post-Operative Diagnosis: Enlarged lymph node [R59.9]      Procedure Performed:   Procedure(s):  EXCISION LEFT INGUINAL LYMPH NODE    Surgeon(s) and Role:     * Wen Driver MD - Primary    Assist

## 2019-10-07 NOTE — INTERVAL H&P NOTE
Pre-op Diagnosis: Enlarged lymph node [R59.9]    The above referenced H&P was reviewed by Carol Arriola MD on 10/7/2019, the patient was examined and no significant changes have occurred in the patient's condition since the H&P was performed.   I discussed

## 2020-01-23 PROBLEM — B35.1 ONYCHOMYCOSIS: Status: ACTIVE | Noted: 2020-01-23

## 2020-03-20 ENCOUNTER — APPOINTMENT (OUTPATIENT)
Dept: LAB | Facility: HOSPITAL | Age: 70
End: 2020-03-20
Payer: MEDICARE

## 2020-03-20 DIAGNOSIS — M24.011 LOOSE BODY IN RIGHT SHOULDER: ICD-10-CM

## 2020-03-20 DIAGNOSIS — M75.101 ROTATOR CUFF SYNDROME OF RIGHT SHOULDER: ICD-10-CM

## 2020-03-20 DIAGNOSIS — M75.41 IMPINGEMENT SYNDROME OF SHOULDER, RIGHT: ICD-10-CM

## 2020-03-20 PROCEDURE — 93005 ELECTROCARDIOGRAM TRACING: CPT

## 2020-03-20 PROCEDURE — 93010 ELECTROCARDIOGRAM REPORT: CPT | Performed by: INTERNAL MEDICINE

## 2020-03-21 LAB
ATRIAL RATE: 77 BPM
P AXIS: 79 DEGREES
P-R INTERVAL: 160 MS
Q-T INTERVAL: 386 MS
QRS DURATION: 76 MS
QTC CALCULATION (BEZET): 436 MS
R AXIS: -9 DEGREES
T AXIS: 31 DEGREES
VENTRICULAR RATE: 77 BPM

## 2020-04-22 PROBLEM — M19.011 PRIMARY OSTEOARTHRITIS OF RIGHT SHOULDER: Status: ACTIVE | Noted: 2020-04-22

## 2020-04-22 PROBLEM — M19.012 PRIMARY OSTEOARTHRITIS OF LEFT SHOULDER: Status: ACTIVE | Noted: 2020-04-22

## 2020-06-02 ENCOUNTER — ANESTHESIA EVENT (OUTPATIENT)
Dept: SURGERY | Facility: HOSPITAL | Age: 70
DRG: 501 | End: 2020-06-02
Payer: MEDICARE

## 2020-06-02 ENCOUNTER — LAB ENCOUNTER (OUTPATIENT)
Dept: LAB | Facility: HOSPITAL | Age: 70
DRG: 501 | End: 2020-06-02
Attending: ORTHOPAEDIC SURGERY
Payer: MEDICARE

## 2020-06-02 DIAGNOSIS — Z01.812 PRE-PROCEDURE LAB EXAM: Primary | ICD-10-CM

## 2020-06-02 NOTE — H&P
[facility]    PATIENT'S NAME: Deepthi Morocho   ATTENDING PHYSICIAN: Jalyn Dick M.D.    PATIENT ACCOUNT#:   [de-identified]    LOCATION:    MEDICAL RECORD #:   [id number]       YOB: 1950  ADMISSION DATE:       06/03/2020    HISTORY AND PHYS the way down his leg to his ankle. He has no obvious medical contraindication to the proposed surgery. We will cancel his surgery for Friday.     PAST MEDICAL HISTORY:  Hypertension, obesity, vitamin D deficiency, secondary polycythemia, chronic pain synd diarrhea. Hizaar. Montelukast, runny nose. Rofecoxib, diarrhea. Singular, runny nose. Verapamil, pain. Zithromax, diarrhea. SOCIAL HISTORY:  Patient is a nonsmoker. He does use alcohol. He denies illicit drug use.     FAMILY HISTORY:   Father is

## 2020-06-02 NOTE — H&P (VIEW-ONLY)
HISTORY AND PHYSICAL EXAMINATION    DATE: 06/01/2020  KARINE BEDOYA ORTHO     HISTORY OF PRESENT ILLNESS:  This is a 61-year-old male who I initially saw for right shoulder pain that I scheduled the patient for a right shoulder arthroscopy during the pandemic.  He secondary polycythemia, chronic pain syndrome, coronary artery disease, history of panic attacks, chronic bilateral low back pain without sciatica, bilateral leg edema, glaucoma bilaterally, sacroiliac pain, chronic pain left knee, facial pain, postnasal d nonsmoker. He does use alcohol. Denies illicit drug use. FAMILY HISTORY:  Father is , he had cancer. Mother is alive, she has cancer. Sister is . She had cancer. REVIEW OF SYSTEMS:  Negative.      PHYSICAL EXAMINATION:  General:  The

## 2020-06-03 ENCOUNTER — HOSPITAL ENCOUNTER (INPATIENT)
Facility: HOSPITAL | Age: 70
LOS: 2 days | Discharge: HOME HEALTH | DRG: 501 | End: 2020-06-05
Attending: ORTHOPAEDIC SURGERY | Admitting: ORTHOPAEDIC SURGERY
Payer: MEDICARE

## 2020-06-03 ENCOUNTER — ANESTHESIA (OUTPATIENT)
Dept: SURGERY | Facility: HOSPITAL | Age: 70
DRG: 501 | End: 2020-06-03
Payer: MEDICARE

## 2020-06-03 DIAGNOSIS — B96.89 SEPTIC INFRAPATELLAR BURSITIS OF LEFT KNEE: ICD-10-CM

## 2020-06-03 DIAGNOSIS — M71.162 SEPTIC INFRAPATELLAR BURSITIS OF LEFT KNEE: ICD-10-CM

## 2020-06-03 PROCEDURE — 87075 CULTR BACTERIA EXCEPT BLOOD: CPT | Performed by: ORTHOPAEDIC SURGERY

## 2020-06-03 PROCEDURE — 87205 SMEAR GRAM STAIN: CPT | Performed by: ORTHOPAEDIC SURGERY

## 2020-06-03 PROCEDURE — 87176 TISSUE HOMOGENIZATION CULTR: CPT | Performed by: ORTHOPAEDIC SURGERY

## 2020-06-03 PROCEDURE — 0M9P0ZZ DRAINAGE OF LEFT KNEE BURSA AND LIGAMENT, OPEN APPROACH: ICD-10-PCS | Performed by: ORTHOPAEDIC SURGERY

## 2020-06-03 PROCEDURE — 82962 GLUCOSE BLOOD TEST: CPT

## 2020-06-03 PROCEDURE — 87070 CULTURE OTHR SPECIMN AEROBIC: CPT | Performed by: ORTHOPAEDIC SURGERY

## 2020-06-03 PROCEDURE — 76937 US GUIDE VASCULAR ACCESS: CPT | Performed by: ORTHOPAEDIC SURGERY

## 2020-06-03 PROCEDURE — 85027 COMPLETE CBC AUTOMATED: CPT | Performed by: NURSE PRACTITIONER

## 2020-06-03 PROCEDURE — 87040 BLOOD CULTURE FOR BACTERIA: CPT | Performed by: INTERNAL MEDICINE

## 2020-06-03 PROCEDURE — 36410 VNPNXR 3YR/> PHY/QHP DX/THER: CPT | Performed by: ORTHOPAEDIC SURGERY

## 2020-06-03 PROCEDURE — 80053 COMPREHEN METABOLIC PANEL: CPT | Performed by: INTERNAL MEDICINE

## 2020-06-03 PROCEDURE — 86140 C-REACTIVE PROTEIN: CPT | Performed by: INTERNAL MEDICINE

## 2020-06-03 RX ORDER — DEXTROSE MONOHYDRATE 25 G/50ML
50 INJECTION, SOLUTION INTRAVENOUS
Status: DISCONTINUED | OUTPATIENT
Start: 2020-06-03 | End: 2020-06-03 | Stop reason: HOSPADM

## 2020-06-03 RX ORDER — MEPERIDINE HYDROCHLORIDE 25 MG/ML
25 INJECTION INTRAMUSCULAR; INTRAVENOUS; SUBCUTANEOUS
Status: DISCONTINUED | OUTPATIENT
Start: 2020-06-03 | End: 2020-06-03 | Stop reason: HOSPADM

## 2020-06-03 RX ORDER — DIPHENHYDRAMINE HYDROCHLORIDE 50 MG/ML
12.5 INJECTION INTRAMUSCULAR; INTRAVENOUS EVERY 4 HOURS PRN
Status: DISCONTINUED | OUTPATIENT
Start: 2020-06-03 | End: 2020-06-05

## 2020-06-03 RX ORDER — NALOXONE HYDROCHLORIDE 0.4 MG/ML
80 INJECTION, SOLUTION INTRAMUSCULAR; INTRAVENOUS; SUBCUTANEOUS AS NEEDED
Status: DISCONTINUED | OUTPATIENT
Start: 2020-06-03 | End: 2020-06-03 | Stop reason: HOSPADM

## 2020-06-03 RX ORDER — SPIRONOLACTONE 25 MG/1
50 TABLET ORAL DAILY
Status: DISCONTINUED | OUTPATIENT
Start: 2020-06-04 | End: 2020-06-05

## 2020-06-03 RX ORDER — MIDAZOLAM HYDROCHLORIDE 1 MG/ML
1 INJECTION INTRAMUSCULAR; INTRAVENOUS EVERY 5 MIN PRN
Status: DISCONTINUED | OUTPATIENT
Start: 2020-06-03 | End: 2020-06-03 | Stop reason: HOSPADM

## 2020-06-03 RX ORDER — DIPHENHYDRAMINE HYDROCHLORIDE 50 MG/ML
25 INJECTION INTRAMUSCULAR; INTRAVENOUS ONCE AS NEEDED
Status: ACTIVE | OUTPATIENT
Start: 2020-06-03 | End: 2020-06-03

## 2020-06-03 RX ORDER — METOCLOPRAMIDE HYDROCHLORIDE 5 MG/ML
10 INJECTION INTRAMUSCULAR; INTRAVENOUS EVERY 6 HOURS PRN
Status: ACTIVE | OUTPATIENT
Start: 2020-06-03 | End: 2020-06-05

## 2020-06-03 RX ORDER — ASPIRIN 325 MG
325 TABLET ORAL 2 TIMES DAILY
Status: DISCONTINUED | OUTPATIENT
Start: 2020-06-03 | End: 2020-06-05

## 2020-06-03 RX ORDER — SENNOSIDES 8.6 MG
17.2 TABLET ORAL NIGHTLY
Status: DISCONTINUED | OUTPATIENT
Start: 2020-06-03 | End: 2020-06-05

## 2020-06-03 RX ORDER — SODIUM PHOSPHATE, DIBASIC AND SODIUM PHOSPHATE, MONOBASIC 7; 19 G/133ML; G/133ML
1 ENEMA RECTAL ONCE AS NEEDED
Status: DISCONTINUED | OUTPATIENT
Start: 2020-06-03 | End: 2020-06-05

## 2020-06-03 RX ORDER — CLINDAMYCIN PHOSPHATE 900 MG/50ML
900 INJECTION INTRAVENOUS ONCE
Status: COMPLETED | OUTPATIENT
Start: 2020-06-03 | End: 2020-06-03

## 2020-06-03 RX ORDER — POLYETHYLENE GLYCOL 3350 17 G/17G
17 POWDER, FOR SOLUTION ORAL DAILY PRN
Status: DISCONTINUED | OUTPATIENT
Start: 2020-06-03 | End: 2020-06-05

## 2020-06-03 RX ORDER — ONDANSETRON 2 MG/ML
4 INJECTION INTRAMUSCULAR; INTRAVENOUS EVERY 4 HOURS PRN
Status: DISPENSED | OUTPATIENT
Start: 2020-06-03 | End: 2020-06-05

## 2020-06-03 RX ORDER — CLONIDINE HYDROCHLORIDE 0.2 MG/1
0.2 TABLET ORAL 2 TIMES DAILY
Status: DISCONTINUED | OUTPATIENT
Start: 2020-06-03 | End: 2020-06-05

## 2020-06-03 RX ORDER — HYDRALAZINE HYDROCHLORIDE 20 MG/ML
10 INJECTION INTRAMUSCULAR; INTRAVENOUS EVERY 6 HOURS PRN
Status: DISCONTINUED | OUTPATIENT
Start: 2020-06-03 | End: 2020-06-05

## 2020-06-03 RX ORDER — MELATONIN
325
Status: DISCONTINUED | OUTPATIENT
Start: 2020-06-04 | End: 2020-06-05

## 2020-06-03 RX ORDER — DIPHENHYDRAMINE HCL 25 MG
25 CAPSULE ORAL ONCE
Status: COMPLETED | OUTPATIENT
Start: 2020-06-04 | End: 2020-06-04

## 2020-06-03 RX ORDER — HYDROMORPHONE HYDROCHLORIDE 1 MG/ML
0.5 INJECTION, SOLUTION INTRAMUSCULAR; INTRAVENOUS; SUBCUTANEOUS EVERY 5 MIN PRN
Status: DISCONTINUED | OUTPATIENT
Start: 2020-06-03 | End: 2020-06-03 | Stop reason: HOSPADM

## 2020-06-03 RX ORDER — SODIUM CHLORIDE, SODIUM LACTATE, POTASSIUM CHLORIDE, CALCIUM CHLORIDE 600; 310; 30; 20 MG/100ML; MG/100ML; MG/100ML; MG/100ML
INJECTION, SOLUTION INTRAVENOUS CONTINUOUS
Status: DISCONTINUED | OUTPATIENT
Start: 2020-06-03 | End: 2020-06-05

## 2020-06-03 RX ORDER — SODIUM CHLORIDE 9 MG/ML
INJECTION, SOLUTION INTRAVENOUS CONTINUOUS
Status: DISCONTINUED | OUTPATIENT
Start: 2020-06-03 | End: 2020-06-05

## 2020-06-03 RX ORDER — SODIUM CHLORIDE, SODIUM LACTATE, POTASSIUM CHLORIDE, CALCIUM CHLORIDE 600; 310; 30; 20 MG/100ML; MG/100ML; MG/100ML; MG/100ML
INJECTION, SOLUTION INTRAVENOUS CONTINUOUS
Status: DISCONTINUED | OUTPATIENT
Start: 2020-06-03 | End: 2020-06-03 | Stop reason: HOSPADM

## 2020-06-03 RX ORDER — MIDAZOLAM HYDROCHLORIDE 1 MG/ML
INJECTION INTRAMUSCULAR; INTRAVENOUS AS NEEDED
Status: DISCONTINUED | OUTPATIENT
Start: 2020-06-03 | End: 2020-06-03 | Stop reason: SURG

## 2020-06-03 RX ORDER — ONDANSETRON 2 MG/ML
INJECTION INTRAMUSCULAR; INTRAVENOUS AS NEEDED
Status: DISCONTINUED | OUTPATIENT
Start: 2020-06-03 | End: 2020-06-03 | Stop reason: SURG

## 2020-06-03 RX ORDER — CEFAZOLIN SODIUM/WATER 2 G/20 ML
2 SYRINGE (ML) INTRAVENOUS EVERY 8 HOURS
Status: DISPENSED | OUTPATIENT
Start: 2020-06-03 | End: 2020-06-05

## 2020-06-03 RX ORDER — BISACODYL 10 MG
10 SUPPOSITORY, RECTAL RECTAL
Status: DISCONTINUED | OUTPATIENT
Start: 2020-06-03 | End: 2020-06-05

## 2020-06-03 RX ORDER — ONDANSETRON 2 MG/ML
4 INJECTION INTRAMUSCULAR; INTRAVENOUS AS NEEDED
Status: DISCONTINUED | OUTPATIENT
Start: 2020-06-03 | End: 2020-06-03 | Stop reason: HOSPADM

## 2020-06-03 RX ORDER — METOCLOPRAMIDE HYDROCHLORIDE 5 MG/ML
INJECTION INTRAMUSCULAR; INTRAVENOUS AS NEEDED
Status: DISCONTINUED | OUTPATIENT
Start: 2020-06-03 | End: 2020-06-03 | Stop reason: SURG

## 2020-06-03 RX ORDER — DOCUSATE SODIUM 100 MG/1
100 CAPSULE, LIQUID FILLED ORAL 2 TIMES DAILY
Status: DISCONTINUED | OUTPATIENT
Start: 2020-06-03 | End: 2020-06-05

## 2020-06-03 RX ORDER — VANCOMYCIN HYDROCHLORIDE 125 MG/1
125 CAPSULE ORAL DAILY
Status: DISCONTINUED | OUTPATIENT
Start: 2020-06-04 | End: 2020-06-05

## 2020-06-03 RX ORDER — CARVEDILOL 12.5 MG/1
12.5 TABLET ORAL
Status: DISCONTINUED | OUTPATIENT
Start: 2020-06-03 | End: 2020-06-05

## 2020-06-03 RX ORDER — ASPIRIN 81 MG/1
81 TABLET ORAL 2 TIMES DAILY
Status: DISCONTINUED | OUTPATIENT
Start: 2020-06-03 | End: 2020-06-03

## 2020-06-03 RX ORDER — DIPHENHYDRAMINE HCL 25 MG
25 CAPSULE ORAL EVERY 4 HOURS PRN
Status: DISCONTINUED | OUTPATIENT
Start: 2020-06-03 | End: 2020-06-05

## 2020-06-03 RX ORDER — DEXTROSE MONOHYDRATE 25 G/50ML
50 INJECTION, SOLUTION INTRAVENOUS
Status: DISCONTINUED | OUTPATIENT
Start: 2020-06-03 | End: 2020-06-05

## 2020-06-03 RX ORDER — PROCHLORPERAZINE EDISYLATE 5 MG/ML
10 INJECTION INTRAMUSCULAR; INTRAVENOUS EVERY 6 HOURS PRN
Status: ACTIVE | OUTPATIENT
Start: 2020-06-03 | End: 2020-06-05

## 2020-06-03 RX ORDER — CLONIDINE HYDROCHLORIDE 0.2 MG/1
0.2 TABLET ORAL 2 TIMES DAILY
Status: DISCONTINUED | OUTPATIENT
Start: 2020-06-03 | End: 2020-06-03

## 2020-06-03 RX ADMIN — ONDANSETRON 4 MG: 2 INJECTION INTRAMUSCULAR; INTRAVENOUS at 13:29:00

## 2020-06-03 RX ADMIN — CLINDAMYCIN PHOSPHATE 900 MG: 900 INJECTION INTRAVENOUS at 13:05:00

## 2020-06-03 RX ADMIN — MIDAZOLAM HYDROCHLORIDE 2 MG: 1 INJECTION INTRAMUSCULAR; INTRAVENOUS at 12:58:00

## 2020-06-03 RX ADMIN — METOCLOPRAMIDE HYDROCHLORIDE 10 MG: 5 INJECTION INTRAMUSCULAR; INTRAVENOUS at 13:24:00

## 2020-06-03 NOTE — PLAN OF CARE
NURSING ADMISSION NOTE      Patient admitted via Cart  Oriented to room. Safety precautions initiated. Bed in low position. Call light in reach. Received pt from PACU around 1545  LLE with ace wrap. Pedal palpable.  Pt states decreased sensation t

## 2020-06-03 NOTE — ANESTHESIA POSTPROCEDURE EVALUATION
[address] Patient Status:  Inpatient   Age/Gender 79year old male MRN [id number]   [facility] SURGERY Attending Marietta Majano MD   Hosp Day # 0 PCP Damien Gilbert MD       Anesthesia Post-op Note    Procedure(s

## 2020-06-03 NOTE — ANESTHESIA PREPROCEDURE EVALUATION
PRE-OP EVALUATION    Patient Name: Kirill Espinal    Pre-op Diagnosis: Septic infrapatellar bursitis of left knee [M71.162, B96.89]    Procedure(s):  INCISION AND DRAINAGE OF INFRAPATELLAR BURSA LEFT KNEE    Surgeon(s) and Role:     She Walters MD - Pr 1/2 tablet  3 times daily prn (Patient taking differently: Take 0.5 mg by mouth 3 (three) times daily as needed. 1/2 tablet  3 times daily prn ), Disp: 30 tablet, Rfl: 1  furosemide 20 MG Oral Tab, Take 1 tablet (20 mg total) by mouth daily.  (Patient dedrick MD at [facility] POSSIBLE POLYPECTOMY [address] N/A 1/10/2014    Performed by Saba Kapoor MD at [facility] [address] [facility] (EUS) N/A 8/13/2019    Performed pre-op labs reviewed.   Lab Results   Component Value Date    WBC 7.91 05/30/2020    RBC 5.14 05/30/2020    HGB 14.6 05/30/2020    HCT 44.9 05/30/2020    MCV 87.4 05/30/2020    MCH 28.4 05/30/2020    MCHC 32.5 05/30/2020    RDW 14.4 05/30/2020     05

## 2020-06-03 NOTE — ANESTHESIA PROCEDURE NOTES
Airway  Date/Time: 6/3/2020 1:05 PM  Urgency: elective      General Information and Staff    Patient location during procedure: OR  Anesthesiologist: Janee Ramirez MD  Performed: anesthesiologist     Indications and Patient Condition  Indications for air

## 2020-06-03 NOTE — CONSULTS
INFECTIOUS DISEASE CONSULTATION    Willie Fleming Patient Status:  Inpatient    1/3/1950 MRN [id number]   [facility] 7NE-A Attending Manny Norton MD   Hosp Day # 0 PCP Cierra Nice POSSIBLE POLYPECTOMY 51752 N/A 1/10/2014    Performed by Yoan Alvarez MD at [address] (EUS) N/A 8/13/2019    Performed by Luciana Pool MD at [facility] ENDOSCOPY   • [address] drugs.       Allergies:    Amoxicillin-Pot Cla*    DIARRHEA, PAIN  Hibiclens [Chlorhex*    RASH  Ibuprofen               NAUSEA ONLY, BLEEDING    Comment:FALSE POSITIVE ON CANCER SCREENINGS  Isothiazolinone Chl*    SWELLING  Prednisone              SWELLING injection 10 mg, 10 mg, Intravenous, Q6H PRN  •  [START ON 6/4/2020] ferrous sulfate EC tab 325 mg, 325 mg, Oral, Daily with breakfast  •  diphenhydrAMINE HCl (BENADRYL) injection 25 mg, 25 mg, Intravenous, Once PRN  •  diphenhydrAMINE (BENADRYL) cap/tab 2 MG Oral Cap, Take 1 capsule (300 mg total) by mouth 4 (four) times daily for 10 days. , Disp: 40 capsule, Rfl: 0  Fluticasone Propionate 50 MCG/ACT Nasal Suspension, 1 spray by Nasal route daily as needed.   , Disp: , Rfl:   Halobetasol Propionate 0.05 % Ext hypertension     OBESITY NOS     Vitamin D deficiency     Secondary polycythemia     Chronic pain syndrome     Family history of colon cancer     CAD (coronary artery disease)     Panic attacks     Chronic bilateral low back pain without sciatica     S/P t

## 2020-06-03 NOTE — INTERVAL H&P NOTE
Pre-op Diagnosis: Septic infrapatellar bursitis of left knee [M71.162, B96.89]    The above referenced H&P was reviewed by Dariusz William MD on 6/3/2020, the patient was examined and no significant changes have occurred in the patient's condition since the

## 2020-06-04 PROBLEM — Z47.89 ORTHOPEDIC AFTERCARE: Status: ACTIVE | Noted: 2020-06-04

## 2020-06-04 PROCEDURE — 97165 OT EVAL LOW COMPLEX 30 MIN: CPT

## 2020-06-04 PROCEDURE — 97161 PT EVAL LOW COMPLEX 20 MIN: CPT

## 2020-06-04 PROCEDURE — 82962 GLUCOSE BLOOD TEST: CPT

## 2020-06-04 RX ORDER — DIPHENHYDRAMINE HCL 25 MG
25 CAPSULE ORAL EVERY 8 HOURS
Status: COMPLETED | OUTPATIENT
Start: 2020-06-04 | End: 2020-06-04

## 2020-06-04 NOTE — CM/SW NOTE
Pt admitted for incision and drainage for bursa of left knee. Pt lives with his wife. PT is recommending Outpt PT. Pt may need IV ABX - no orders yet.
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
## 2020-06-04 NOTE — CONSULTS
[facility] Hospitalist Initial Consult       Reason for Consult: Medical Management sp I&D of the left knee bursae abscess      History of Present Illness: Patient is a 79year old male with PMH sig for HTN, anxiety, hx of Cdiff who presents sp the above procedur Oral Once     Continuous Infusions:   • lactated ringers 20 mL/hr at 06/03/20 1147   • sodium chloride 125 mL/hr at 06/03/20 1434     PRN: sodium chloride 0.9%, PEG 3350, magnesium hydroxide, bisacodyl, Fleet Enema, ondansetron HCl, Metoclopramide HCl, [employer]   • COLONOSCOPY,DIAGNOSTIC     • ENDOSCOPIC ULTRASOUND (EUS) N/A 8/13/2019    Performed by Jose Aragon MD at [facility] ENDOSCOPY   • ESOPHAGOGASTRODUODENOSCOPY (EGD) N/A 6/22/2019    Performed by Mishel Evangelista MD at [address] [place] [address] Review of Systems  No CP or SOB  All 10 systems otherwise reviewed and negative unless otherwise stated in the HPI.       OBJECTIVE:  /73 (BP Location: Right arm)   Pulse 67   Temp 97.7 °F (36.5 °C) (Oral)   Resp 19   Ht 5' 5\" (1.651 m)   Wt 26

## 2020-06-04 NOTE — PLAN OF CARE
Assumed care @ 0700  LLE dressing c/d/I  Po benadryl ordered prior to ancef administration. Pt tolerated ancef without signs of allergic reaction  c/o intermittent nausea. Zofran given  Hot pack given for c/o mild HA  Ambulated in the moore with therapy.  Up

## 2020-06-04 NOTE — PHYSICAL THERAPY NOTE
PHYSICAL THERAPY QUICK EVALUATION - INPATIENT    Room Number: 4069/0290-B  Evaluation Date: 6/4/2020  Presenting Problem: s/p I and D infrapatellar bursa abscess/infection  Physician Order: PT Eval and Treat    Problem List  Active Problems:    * No acti FACET INJECTION OR MEDIAL BRANCH NERVE BLOCK N/A 1/5/2015    Performed by Calderon Harris MD at [address] Bilateral 12/19/2014    Performed by Calderon Harris MD at [facility] extremity ROM and strength are within functional limits     Lower extremity ROM is within functional limits     Lower extremity strength is within functional limits     NEUROLOGICAL FINDINGS                      ACTIVITY TOLERANCE                         O overall evaluation complexity is considered low. Recommend OPPT for further strengthening/ROM L knee and for continued lymphedema treatment.    PT Discharge Recommendations: Home;Outpatient PT    PLAN  Patient has been evaluated and presents with no skilled

## 2020-06-04 NOTE — OPERATIVE REPORT
[facility]    PATIENT'S NAME: Fermin Cornea   ATTENDING PHYSICIAN: Valeria Marquis M.D. OPERATING PHYSICIAN: Valeria Marquis M.D.    PATIENT ACCOUNT#:   [de-identified]    LOCATION:  [address] [place] [address]  MEDICAL RECORD #:   [id number]       DATE OF BIRTH:  [birth year] less than 2 mL. There were no complications. The specimen consisted only of the specimen for microbiology. The patient tolerated the procedure without complication and went to recovery room in stable condition.   Patient was admitted to the hospital for

## 2020-06-04 NOTE — PROGRESS NOTES
[facility]                INFECTIOUS DISEASE PROGRESS NOTE    [facility] Patient Status:  Inpatient    1/3/1950 MRN [id number]   [facility] [place] 7NE-A Attending Idalia Danielle MD   Hosp Day # 1 PCP Adriano Howe MD     Anti 2.  ANAEROBIC CULTURE     Status: None (Preliminary result)    Collection Time: 06/03/20  1:22 PM   Result Value Ref Range    Anaerobic Culture Pending N/A             Problem list reviewed:  Patient Active Problem List:     Essential hypertension     OBE

## 2020-06-04 NOTE — PROGRESS NOTES
[facility] Hospitalist Progress Note                                                                   [address]  1/3/1950    CC: QUE sp I&D    Interval History:  - Doing well, felt some flushing Comment:Extreme fatigue, narcolepetic episodes  Avapro [Irbesartan]     DIARRHEA  Celebrex [Celecoxib]    DIARRHEA, NAUSEA ONLY  Fluticasone-[place]*    Runny nose    Comment:\"DIDN'T HELP\"  Hyzaar                  NAUSEA ONLY  Montelukast             Run

## 2020-06-04 NOTE — PLAN OF CARE
Assumed care at [address] [place] [address]. C/o R shoulder pain but denies pain to L knee. Denied need for pain medication at this time. 2L O2 applied per nasal cannula while patient sleeping. L knee dressing clean, dry, intact. Plan: PT to see.        Problem: PAIN - ADUL

## 2020-06-05 VITALS
SYSTOLIC BLOOD PRESSURE: 146 MMHG | BODY MASS INDEX: 44.68 KG/M2 | HEART RATE: 67 BPM | HEIGHT: 65 IN | WEIGHT: 268.19 LBS | DIASTOLIC BLOOD PRESSURE: 84 MMHG | RESPIRATION RATE: 29 BRPM | TEMPERATURE: 98 F | OXYGEN SATURATION: 94 %

## 2020-06-05 PROCEDURE — B548ZZA ULTRASONOGRAPHY OF SUPERIOR VENA CAVA, GUIDANCE: ICD-10-PCS | Performed by: ORTHOPAEDIC SURGERY

## 2020-06-05 PROCEDURE — 02HV33Z INSERTION OF INFUSION DEVICE INTO SUPERIOR VENA CAVA, PERCUTANEOUS APPROACH: ICD-10-PCS | Performed by: ORTHOPAEDIC SURGERY

## 2020-06-05 PROCEDURE — 3E04329 INTRODUCTION OF OTHER ANTI-INFECTIVE INTO CENTRAL VEIN, PERCUTANEOUS APPROACH: ICD-10-PCS | Performed by: ORTHOPAEDIC SURGERY

## 2020-06-05 PROCEDURE — 82962 GLUCOSE BLOOD TEST: CPT

## 2020-06-05 PROCEDURE — 36573 INSJ PICC RS&I 5 YR+: CPT

## 2020-06-05 RX ORDER — CEFTRIAXONE SODIUM 2 G/50ML
2 INJECTION, SOLUTION INTRAVENOUS EVERY 24 HOURS
Qty: 1250 ML | Refills: 0 | Status: SHIPPED | OUTPATIENT
Start: 2020-06-06 | End: 2020-07-01

## 2020-06-05 RX ORDER — CEFTRIAXONE SODIUM 2 G/50ML
2 INJECTION, SOLUTION INTRAVENOUS EVERY 24 HOURS
Qty: 1250 ML | Refills: 0 | Status: SHIPPED | OUTPATIENT
Start: 2020-06-06 | End: 2020-06-05

## 2020-06-05 RX ORDER — HYDRALAZINE HYDROCHLORIDE 25 MG/1
25 TABLET, FILM COATED ORAL EVERY 8 HOURS SCHEDULED
Status: DISCONTINUED | OUTPATIENT
Start: 2020-06-05 | End: 2020-06-05

## 2020-06-05 RX ORDER — VANCOMYCIN HYDROCHLORIDE 125 MG/1
125 CAPSULE ORAL DAILY
Qty: 25 CAPSULE | Refills: 0 | Status: SHIPPED | OUTPATIENT
Start: 2020-06-06 | End: 2020-07-01

## 2020-06-05 NOTE — PROGRESS NOTES
[facility] Hospitalist Progress Note                                                                   [address]  1/3/1950    CC: QUE pro I&D    Interval History:  Feels okay.  Reports chronic diar nose    Comment:\"DIDN'T HELP\"  Hyzaar                  NAUSEA ONLY  Montelukast             Runny nose  Rofecoxib               DIARRHEA, NAUSEA ONLY    Comment:ANKLES SWELLING  Singulair               Runny nose  Verapamil Hcl           PAIN    Comment:

## 2020-06-05 NOTE — CM/SW NOTE
Order received for IV ABX. Per RN Gordy Durham, pt prefers to go to the infusion office for his IV ABX rather than having them at home.   Gary Smith at [facility] [phone] who said that pt would be covered for in office infusion with his Medicare and BCBS supple

## 2020-06-05 NOTE — PLAN OF CARE
Assumed care 1900  Patient alert and oriented x4  Refusing bed and chair alarms  L leg dressing reinforced  Pedals palpable bilaterally  Edema +2 to bilateral feet  SB on tele  Will monitor

## 2020-06-05 NOTE — PROGRESS NOTES
[facility]                INFECTIOUS DISEASE PROGRESS NOTE    Yan Girard Patient Status:  Inpatient    1/3/1950 MRN [id number]   [facility] 7NE-A Attending Jason Osei MD   Hosp Day # 2 PCP Robi Merino MD     Anti Collection Time: 06/03/20  1:22 PM   Result Value Ref Range    Tissue Culture Result No Growth 2 Days N/A    Tissue Smear 3+ WBCs seen N/A    Tissue Smear No organisms seen N/A   3.  ANAEROBIC CULTURE     Status: None (Preliminary result)    Collection Time

## 2020-06-05 NOTE — CM/SW NOTE
Spoke with Leila Ellison from [facility] about pt's in office infusion. Email clinical information.   She said pt can go to the [facility] in Critical access hospital tomorrow Sat at 10:30am and then at Sunday at 10:00am.  She will call the pt with his scheduled appointments once she get

## 2020-06-06 NOTE — PLAN OF CARE
NURSING DISCHARGE NOTE    Discharged Home via Wheelchair. Accompanied by Support staff  Belongings Taken by patient/family. Cleared for discharge by all consults. Discharge AVS completed and reviewed with patient.  Discussed medications, including p for physical needs  - Identify cognitive and physical deficits and behaviors that affect risk of falls.   - [place] fall precautions as indicated by assessment.  - Educate pt/family on patient safety including physical limitations  - Instruct pt to call f

## 2020-06-06 NOTE — DISCHARGE SUMMARY
Discharge Summary  Patient ID:  Bogdan Babin  [id number]  79year old  1/3/1950    Admit date: 6/3/2020    Discharge date and time: 6/5/2020    Attending Physician: Enrique Blackman.  Bina Gerber MD    Reason for admission: Septic infrapatellar bursitis of left knee [M71.162, B

## 2020-06-08 NOTE — CM/SW NOTE
06/08/20 0800   Discharge disposition   Expected discharge disposition Home-Health   Name of Facillity/Home Care/Hospice Residential   Outpatient services Infusion center   Home services after discharge Skilled home care   HME provider   [facility] for IV AB

## 2020-06-24 ENCOUNTER — PATIENT OUTREACH (OUTPATIENT)
Dept: INFECTIOUS DISEASE | Facility: CLINIC | Age: 70
End: 2020-06-24

## 2020-06-24 NOTE — PROGRESS NOTES
Left knee wound dehiscence, wound vac ordered by Dr. Jason Calabrese. Wound clean, serous discharge FU Dr. Jason Calabrese next week. Extending ivabx 2 more weeks.  Issue is unccontrolled lymphedema

## 2020-07-29 ENCOUNTER — OFFICE VISIT (OUTPATIENT)
Dept: WOUND CARE | Facility: HOSPITAL | Age: 70
End: 2020-07-29
Attending: NURSE PRACTITIONER
Payer: MEDICARE

## 2020-07-29 DIAGNOSIS — E11.42 TYPE 2 DIABETES MELLITUS WITH DIABETIC POLYNEUROPATHY, WITHOUT LONG-TERM CURRENT USE OF INSULIN (HCC): Primary | ICD-10-CM

## 2020-07-29 DIAGNOSIS — T84.89XD OTHER SPECIFIED COMPLICATION OF INTERNAL ORTHOPEDIC PROSTHETIC DEVICES, IMPLANTS AND GRAFTS, SUBSEQUENT ENCOUNTER: ICD-10-CM

## 2020-07-29 DIAGNOSIS — L97.806 NON-PRESSURE CHRONIC ULCER OF OTHER PART OF UNSPECIFIED LOWER LEG WITH BONE INVOLVEMENT WITHOUT EVIDENCE OF NECROSIS (HCC): ICD-10-CM

## 2020-07-29 DIAGNOSIS — M71.062 ABSCESS OF BURSA, LEFT KNEE: ICD-10-CM

## 2020-07-29 LAB — GLUCOSE BLD-MCNC: 176 MG/DL (ref 70–99)

## 2020-07-29 PROCEDURE — 99214 OFFICE O/P EST MOD 30 MIN: CPT

## 2020-07-29 PROCEDURE — 97597 DBRDMT OPN WND 1ST 20 CM/<: CPT

## 2020-07-29 PROCEDURE — 82962 GLUCOSE BLOOD TEST: CPT

## 2020-07-29 NOTE — PROGRESS NOTES
Subjective    Chief Complaint  This information was obtained from the patient  Pt here for initial assessment, had surgery in June to remove abscess to the left knee, presents with a negative pressure therapy machine, is set up with [facility] and comes i 7-29-20 INITIAL:  80 yo with history of Left knee septic arthritis. Hx of diabetes, osteoarthritis, cad, htn, hl, polycythemia.  He underwent an I and D of an acute infrapatellar septic bursitis by Dr. Armand Lima on 06/03/2020.-, had PICC line, and received IV a vitamin d deficiency  visual impairment  seizure disorder  polycythemia  panic attacks  hyperlipidemia  osteoarthritis  ocular migrane  neuropathy  high blood pressure  heart attack  diabetes  colon polyp  back problem  asthma    Surgical History  This inf Genitourinary (): Urinary Incontinence, Painful urination  Musculoskeletal: Assistive Devices  Psychiatric: Memory Loss, Depression    Additional Information  Medication reconciliation completed at today's visit. : Yes  History of chemotherapy?: No  Hist Height/Length: 65 in (165.1 cm), Weight: 260 lbs (118.18 kgs), BMI: 43.3, Pulse: 74 bpm, Respiratory Rate: 16 breaths/min, Blood Pressure: 127/73 mmHg, Capillary Blood Glucose: 176 mg/dl.   Vital Signs Notes: Temp taken at door, no fever    Physical Exam  C Wound #1 (Surgical Wound) is located on the left knee. A selective debridement with a total area debrided of 1.84 sq cm was performed by Sapna Bhardwaj NP. to remove devitalized tissue: biofilm and slough. The following instrument(s) were used: curette.  Yoko Landon Last Albumin Date and Value: - June 2020 albumin 3.5/tp7.0  Osteomyelitis suspected due to: - infection, non healing, prosthesis    Medications prescribed:  gentamicin - topical 0.1 % ointment twice daily for 14 days for wound starting 7/29/2020    Follow-

## 2020-08-05 ENCOUNTER — OFFICE VISIT (OUTPATIENT)
Dept: WOUND CARE | Facility: HOSPITAL | Age: 70
End: 2020-08-05
Attending: NURSE PRACTITIONER
Payer: MEDICARE

## 2020-08-05 DIAGNOSIS — T84.89XD OTHER SPECIFIED COMPLICATION OF INTERNAL ORTHOPEDIC PROSTHETIC DEVICES, IMPLANTS AND GRAFTS, SUBSEQUENT ENCOUNTER: ICD-10-CM

## 2020-08-05 DIAGNOSIS — E11.42 TYPE 2 DIABETES MELLITUS WITH DIABETIC POLYNEUROPATHY, WITHOUT LONG-TERM CURRENT USE OF INSULIN (HCC): Primary | ICD-10-CM

## 2020-08-05 DIAGNOSIS — L97.812 NON-PRESSURE CHRONIC ULCER OF OTHER PART OF RIGHT LOWER LEG WITH FAT LAYER EXPOSED (HCC): ICD-10-CM

## 2020-08-05 DIAGNOSIS — M71.062 ABSCESS OF BURSA, LEFT KNEE: ICD-10-CM

## 2020-08-05 DIAGNOSIS — L97.806 NON-PRESSURE CHRONIC ULCER OF OTHER PART OF UNSPECIFIED LOWER LEG WITH BONE INVOLVEMENT WITHOUT EVIDENCE OF NECROSIS (HCC): ICD-10-CM

## 2020-08-05 LAB — GLUCOSE BLD-MCNC: 164 MG/DL (ref 70–99)

## 2020-08-05 PROCEDURE — 29581 APPL MULTLAYER CMPRN SYS LEG: CPT

## 2020-08-05 PROCEDURE — 82962 GLUCOSE BLOOD TEST: CPT

## 2020-08-05 NOTE — PROGRESS NOTES
Subjective    Chief Complaint  This information was obtained from the patient  Patient is here for a follow up visit for a left leg wound. Arrives with no compression in place.     Allergies  hibicleanse (Reaction: rash), ibuprofen (Reaction: nausea), Isoth utilizing the gentamicin for the last week.  he presents today without any compression on. he states he did not bring his wound vac because I did not tell him to.  he has a weeping area on his right posterior calf which he states \"it was there last week an Wound and has received a status of Not Healed. Subsequent wound encounter measurements are 2.6cm length x 0.5cm width x 0.8cm depth, with an area of 1.3 sq cm and a volume of 1.04 cubic cm. No tunneling has been noted. No sinus tract has been noted.  Sanjeev Diaz easy and unlabored. Temperature wnl. Obese. Appearance neat and clean. Appears in no acute distress. Well nourished and well developed. Cardiovascular:  dp/pt palpable bilaterally.  Extremities free of varicosities, + severe pitting edema and lymphadema Orders: Other negative pressure wound therapy device. See custom orders. - hold vac at this time    Follow-Up Appointments:  Return Appointment in 1 week. RN visit for assessment of wound(s).  - Friday for comprilan change    Misc/Additional Orders:  Supp

## 2020-08-07 ENCOUNTER — OFFICE VISIT (OUTPATIENT)
Dept: WOUND CARE | Facility: HOSPITAL | Age: 70
End: 2020-08-07
Attending: NURSE PRACTITIONER
Payer: MEDICARE

## 2020-08-07 DIAGNOSIS — T84.89XD OTHER SPECIFIED COMPLICATION OF INTERNAL ORTHOPEDIC PROSTHETIC DEVICES, IMPLANTS AND GRAFTS, SUBSEQUENT ENCOUNTER: ICD-10-CM

## 2020-08-07 DIAGNOSIS — I89.0 LYMPHEDEMA, NOT ELSEWHERE CLASSIFIED: ICD-10-CM

## 2020-08-07 DIAGNOSIS — L97.812 NON-PRESSURE CHRONIC ULCER OF OTHER PART OF RIGHT LOWER LEG WITH FAT LAYER EXPOSED (HCC): ICD-10-CM

## 2020-08-07 DIAGNOSIS — M71.062 ABSCESS OF BURSA, LEFT KNEE: ICD-10-CM

## 2020-08-07 DIAGNOSIS — L97.806 NON-PRESSURE CHRONIC ULCER OF OTHER PART OF UNSPECIFIED LOWER LEG WITH BONE INVOLVEMENT WITHOUT EVIDENCE OF NECROSIS (HCC): Primary | ICD-10-CM

## 2020-08-07 LAB — GLUCOSE BLD-MCNC: 259 MG/DL (ref 70–99)

## 2020-08-07 PROCEDURE — 82962 GLUCOSE BLOOD TEST: CPT

## 2020-08-07 PROCEDURE — 29581 APPL MULTLAYER CMPRN SYS LEG: CPT

## 2020-08-11 ENCOUNTER — OFFICE VISIT (OUTPATIENT)
Dept: WOUND CARE | Facility: HOSPITAL | Age: 70
End: 2020-08-11
Attending: NURSE PRACTITIONER
Payer: MEDICARE

## 2020-08-11 DIAGNOSIS — T84.89XD OTHER SPECIFIED COMPLICATION OF INTERNAL ORTHOPEDIC PROSTHETIC DEVICES, IMPLANTS AND GRAFTS, SUBSEQUENT ENCOUNTER: ICD-10-CM

## 2020-08-11 DIAGNOSIS — E11.42 TYPE 2 DIABETES MELLITUS WITH DIABETIC POLYNEUROPATHY, WITHOUT LONG-TERM CURRENT USE OF INSULIN (HCC): Primary | ICD-10-CM

## 2020-08-11 DIAGNOSIS — L97.812 NON-PRESSURE CHRONIC ULCER OF OTHER PART OF RIGHT LOWER LEG WITH FAT LAYER EXPOSED (HCC): ICD-10-CM

## 2020-08-11 DIAGNOSIS — M71.062 ABSCESS OF BURSA, LEFT KNEE: ICD-10-CM

## 2020-08-11 DIAGNOSIS — L97.806 NON-PRESSURE CHRONIC ULCER OF OTHER PART OF UNSPECIFIED LOWER LEG WITH BONE INVOLVEMENT WITHOUT EVIDENCE OF NECROSIS (HCC): ICD-10-CM

## 2020-08-11 DIAGNOSIS — I89.0 LYMPHEDEMA, NOT ELSEWHERE CLASSIFIED: ICD-10-CM

## 2020-08-11 LAB — GLUCOSE BLD-MCNC: 165 MG/DL (ref 70–99)

## 2020-08-11 PROCEDURE — 82962 GLUCOSE BLOOD TEST: CPT

## 2020-08-11 PROCEDURE — 15271 SKIN SUB GRAFT TRNK/ARM/LEG: CPT

## 2020-08-11 NOTE — PROGRESS NOTES
Subjective    Chief Complaint  This information was obtained from the patient  Patient is here for a follow up of bilateral lower leg wounds.     Allergies  hibicleanse (Reaction: rash), ibuprofen (Reaction: nausea), Isothiazolinones (Reaction: swelling), p last week. he presents today without any compression on. he states he did not bring his wound vac because I did not tell him to.  he has a weeping area on his right posterior calf which he states \"it was there last week and you didn't look at it\".  he sta Claudication, Lower extremity (leg) resting pain  Musculoskeletal: Assistive Devices  Psychiatric: Memory Loss, Depression    Additional Information  Medication reconciliation completed at today's visit. : Yes        Objective    Wound Assessment(s)  Wound Weight: 260 lbs (118.18 kgs), BMI: 43.3, Pulse: 88 bpm, Respiratory Rate: 20 breaths/min, Blood Pressure: 105/57 mmHg, Capillary Blood Glucose: 163 mg/dl.   Vital Signs Notes: Temperature taken at the main at the main entrance    Physical Exam  Constitution sq cm of product was utilized and was not secured. Post application, a dressing was applied: sorbact, kerramax. A time out was conducted prior to the start of the procedure.  The procedure was tolerated well with a pain level of 0 throughout and a pain leve summary  Reviewed and evaluated labs. - June 2020 bun 13, creat 0.94, gfr 82  Wound type: - s/p I&D septic bursitis  Wound improving. No s/s of infection. Perfusion assessed by palpation of pulses.   Last Sharp Debridement Date: - 7-29-20  Last A1C Date an

## 2020-08-14 ENCOUNTER — OFFICE VISIT (OUTPATIENT)
Dept: WOUND CARE | Facility: HOSPITAL | Age: 70
End: 2020-08-14
Attending: NURSE PRACTITIONER
Payer: MEDICARE

## 2020-08-14 DIAGNOSIS — M71.062 ABSCESS OF BURSA, LEFT KNEE: ICD-10-CM

## 2020-08-14 DIAGNOSIS — I89.0 LYMPHEDEMA, NOT ELSEWHERE CLASSIFIED: ICD-10-CM

## 2020-08-14 DIAGNOSIS — L97.806 NON-PRESSURE CHRONIC ULCER OF OTHER PART OF UNSPECIFIED LOWER LEG WITH BONE INVOLVEMENT WITHOUT EVIDENCE OF NECROSIS (HCC): Primary | ICD-10-CM

## 2020-08-14 DIAGNOSIS — T84.89XD OTHER SPECIFIED COMPLICATION OF INTERNAL ORTHOPEDIC PROSTHETIC DEVICES, IMPLANTS AND GRAFTS, SUBSEQUENT ENCOUNTER: ICD-10-CM

## 2020-08-14 DIAGNOSIS — L97.812 NON-PRESSURE CHRONIC ULCER OF OTHER PART OF RIGHT LOWER LEG WITH FAT LAYER EXPOSED (HCC): ICD-10-CM

## 2020-08-14 LAB — GLUCOSE BLD-MCNC: 268 MG/DL (ref 70–99)

## 2020-08-14 PROCEDURE — 82962 GLUCOSE BLOOD TEST: CPT

## 2020-08-14 PROCEDURE — 29581 APPL MULTLAYER CMPRN SYS LEG: CPT

## 2020-08-18 ENCOUNTER — OFFICE VISIT (OUTPATIENT)
Dept: WOUND CARE | Facility: HOSPITAL | Age: 70
End: 2020-08-18
Attending: NURSE PRACTITIONER
Payer: MEDICARE

## 2020-08-18 DIAGNOSIS — L97.812 NON-PRESSURE CHRONIC ULCER OF OTHER PART OF RIGHT LOWER LEG WITH FAT LAYER EXPOSED (HCC): ICD-10-CM

## 2020-08-18 DIAGNOSIS — L97.806 NON-PRESSURE CHRONIC ULCER OF OTHER PART OF UNSPECIFIED LOWER LEG WITH BONE INVOLVEMENT WITHOUT EVIDENCE OF NECROSIS (HCC): ICD-10-CM

## 2020-08-18 DIAGNOSIS — M71.062 ABSCESS OF BURSA, LEFT KNEE: ICD-10-CM

## 2020-08-18 DIAGNOSIS — I89.0 LYMPHEDEMA, NOT ELSEWHERE CLASSIFIED: ICD-10-CM

## 2020-08-18 DIAGNOSIS — T84.89XD OTHER SPECIFIED COMPLICATION OF INTERNAL ORTHOPEDIC PROSTHETIC DEVICES, IMPLANTS AND GRAFTS, SUBSEQUENT ENCOUNTER: ICD-10-CM

## 2020-08-18 DIAGNOSIS — E11.42 TYPE 2 DIABETES MELLITUS WITH DIABETIC POLYNEUROPATHY, WITHOUT LONG-TERM CURRENT USE OF INSULIN (HCC): Primary | ICD-10-CM

## 2020-08-18 LAB — GLUCOSE BLD-MCNC: 217 MG/DL (ref 70–99)

## 2020-08-18 PROCEDURE — 29581 APPL MULTLAYER CMPRN SYS LEG: CPT

## 2020-08-18 PROCEDURE — 82962 GLUCOSE BLOOD TEST: CPT

## 2020-08-18 NOTE — PROGRESS NOTES
Subjective    Chief Complaint  This information was obtained from the patient  Patient is here for a follow up of bilateral lower leg wounds. Pt states no new concern or pain. \"it's just itchy. \"    Allergies  hibicleanse (Reaction: rash), ibuprofen (Reac 8-5-20 patient returns today, he has been utilizing the gentamicin for the last week.  he presents today without any compression on. he states he did not bring his wound vac because I did not tell him to.  he has a weeping area on his right posterior calf w Integumentary (Hair/Skin/Nails): Other (s/p I&D for septic arthritis), Prone to Skin Tears (due to severe edema)  Neurological: Other (hx of seizure disorder, neuropathy)  Hematologic/Lymphatic: Bleeding / Clotting Disorders (polycythemia)  Psychiatric:  An Height/Length: 65 in (165.1 cm), Weight: 260 lbs (118.18 kgs), BMI: 43.3, Pulse: 80 bpm, Respiratory Rate: 20 breaths/min, Blood Pressure: 134/91 mmHg, Capillary Blood Glucose: 217 mg/dl.   Vital Signs Notes: Temperature taken at front per COVID policy    P Change Dressing Every: - weekly    Compression Therapy:  Comprilan - left  left from base of toes to thigh: 1x8, 3x10, 1x12    Compression Pump @ ______ mmHg - USE YOUR PUMPS AT LEAST DAILY.  we cannot heal your wound with the amount of edema you have  Avoi

## 2020-08-25 ENCOUNTER — OFFICE VISIT (OUTPATIENT)
Dept: WOUND CARE | Facility: HOSPITAL | Age: 70
End: 2020-08-25
Attending: NURSE PRACTITIONER
Payer: MEDICARE

## 2020-08-25 DIAGNOSIS — L97.812 NON-PRESSURE CHRONIC ULCER OF OTHER PART OF RIGHT LOWER LEG WITH FAT LAYER EXPOSED (HCC): Primary | ICD-10-CM

## 2020-08-25 DIAGNOSIS — L97.806 NON-PRESSURE CHRONIC ULCER OF OTHER PART OF UNSPECIFIED LOWER LEG WITH BONE INVOLVEMENT WITHOUT EVIDENCE OF NECROSIS (HCC): ICD-10-CM

## 2020-08-25 DIAGNOSIS — T84.89XD OTHER SPECIFIED COMPLICATION OF INTERNAL ORTHOPEDIC PROSTHETIC DEVICES, IMPLANTS AND GRAFTS, SUBSEQUENT ENCOUNTER: ICD-10-CM

## 2020-08-25 DIAGNOSIS — M71.062 ABSCESS OF BURSA, LEFT KNEE: ICD-10-CM

## 2020-08-25 DIAGNOSIS — I89.0 LYMPHEDEMA, NOT ELSEWHERE CLASSIFIED: ICD-10-CM

## 2020-08-25 PROCEDURE — 15271 SKIN SUB GRAFT TRNK/ARM/LEG: CPT

## 2020-08-25 PROCEDURE — 82962 GLUCOSE BLOOD TEST: CPT

## 2020-08-25 NOTE — PROGRESS NOTES
Subjective    Chief Complaint  This information was obtained from the patient  Patient is here for a follow up of left lower leg wound. Denies any pain but complains of itchiness. No issues with wraps.     Allergies  hibicleanse (Reaction: rash), ibuprofen returns today, he has been utilizing the gentamicin for the last week.  he presents today without any compression on. he states he did not bring his wound vac because I did not tell him to.  he has a weeping area on his right posterior calf which he states chart    Complaints and Symptoms  Patient complains of:  Respiratory: Other (cold air induced asthma, untreated BRIGITTE)  Cardiovascular (Central/Peripheral): Edema (lymphedema), Other (hl, htn)  Musculoskeletal: Joint Pain (osteoarthritis), Backache (neck)  I infection.  Local Pulse is Normal.  General Notes:  100% integrating theraskin, that lifts to reveal pink granulation in the base    Vitals  Height/Length: 65 in (165.1 cm), Weight: 260 lbs (118.18 kgs), BMI: 43.3, Pulse: 76 bpm, Respiratory Rate: 20 breath time out was conducted prior to the start of the procedure. The procedure was tolerated well with a pain level of 0 throughout and a pain level of 0 following the procedure. Wound #1 (Surgical Wound) is located on the left knee.  A Multi-Layer Compressio

## 2020-08-26 LAB — GLUCOSE BLD-MCNC: 159 MG/DL (ref 70–99)

## 2020-09-01 ENCOUNTER — OFFICE VISIT (OUTPATIENT)
Dept: WOUND CARE | Facility: HOSPITAL | Age: 70
End: 2020-09-01
Attending: NURSE PRACTITIONER
Payer: MEDICARE

## 2020-09-01 DIAGNOSIS — L97.812 NON-PRESSURE CHRONIC ULCER OF OTHER PART OF RIGHT LOWER LEG WITH FAT LAYER EXPOSED (HCC): Primary | ICD-10-CM

## 2020-09-01 DIAGNOSIS — M71.062 ABSCESS OF BURSA, LEFT KNEE: ICD-10-CM

## 2020-09-01 DIAGNOSIS — L97.806 NON-PRESSURE CHRONIC ULCER OF OTHER PART OF UNSPECIFIED LOWER LEG WITH BONE INVOLVEMENT WITHOUT EVIDENCE OF NECROSIS (HCC): ICD-10-CM

## 2020-09-01 DIAGNOSIS — I89.0 LYMPHEDEMA, NOT ELSEWHERE CLASSIFIED: ICD-10-CM

## 2020-09-01 DIAGNOSIS — T84.89XD OTHER SPECIFIED COMPLICATION OF INTERNAL ORTHOPEDIC PROSTHETIC DEVICES, IMPLANTS AND GRAFTS, SUBSEQUENT ENCOUNTER: ICD-10-CM

## 2020-09-01 LAB — GLUCOSE BLD-MCNC: 266 MG/DL (ref 70–99)

## 2020-09-01 PROCEDURE — 82962 GLUCOSE BLOOD TEST: CPT

## 2020-09-01 PROCEDURE — 29581 APPL MULTLAYER CMPRN SYS LEG: CPT

## 2020-09-01 NOTE — PROGRESS NOTES
Subjective    Chief Complaint  This information was obtained from the patient  Patient is here for a follow up of left lower leg wound. He denies any pain and no issues with the wrap.     Allergies  hibicleanse (Reaction: rash), ibuprofen (Reaction: nausea) 8-5-20 patient returns today, he has been utilizing the gentamicin for the last week.  he presents today without any compression on. he states he did not bring his wound vac because I did not tell him to.  he has a weeping area on his right posterior calf w 9-1-20 patient returns today, no c/o pain, there is still epicord remaining in the base from last week, we will not disturb it and allow it to continue integrating, continue with compression. no s/s of infection.     Review of Systems (ROS)  This informatio The periwound skin exhibited: Brawny Induration, Edema, Dry/Scaly. The periwound skin did not exhibit: Ecchymosis, Erythema. The temperature of the periwound skin is WNL. Periwound skin does not exhibit signs or symptoms of infection.  Local Pulse is Normal Epifix - epicord #1 stacked into wound bed on 8-25, remains on 9-1. cover with non bordered silicone ag foam and allow to continue integrating.   Change Dressing Every: - weekly    Compression Therapy:  Comprilan - left  left from base of toes to thigh: 1x8

## 2020-09-08 ENCOUNTER — OFFICE VISIT (OUTPATIENT)
Dept: WOUND CARE | Facility: HOSPITAL | Age: 70
End: 2020-09-08
Attending: NURSE PRACTITIONER
Payer: MEDICARE

## 2020-09-08 DIAGNOSIS — L97.806 NON-PRESSURE CHRONIC ULCER OF OTHER PART OF UNSPECIFIED LOWER LEG WITH BONE INVOLVEMENT WITHOUT EVIDENCE OF NECROSIS (HCC): ICD-10-CM

## 2020-09-08 DIAGNOSIS — I89.0 LYMPHEDEMA, NOT ELSEWHERE CLASSIFIED: ICD-10-CM

## 2020-09-08 DIAGNOSIS — L97.812 NON-PRESSURE CHRONIC ULCER OF OTHER PART OF RIGHT LOWER LEG WITH FAT LAYER EXPOSED (HCC): Primary | ICD-10-CM

## 2020-09-08 DIAGNOSIS — T84.89XD OTHER SPECIFIED COMPLICATION OF INTERNAL ORTHOPEDIC PROSTHETIC DEVICES, IMPLANTS AND GRAFTS, SUBSEQUENT ENCOUNTER: ICD-10-CM

## 2020-09-08 DIAGNOSIS — M71.062 ABSCESS OF BURSA, LEFT KNEE: ICD-10-CM

## 2020-09-08 LAB — GLUCOSE BLD-MCNC: 379 MG/DL (ref 70–99)

## 2020-09-08 PROCEDURE — 82962 GLUCOSE BLOOD TEST: CPT

## 2020-09-08 PROCEDURE — 15271 SKIN SUB GRAFT TRNK/ARM/LEG: CPT

## 2020-09-08 NOTE — PROGRESS NOTES
Subjective    Chief Complaint  This information was obtained from the patient  Patient is here for a follow up of left lower leg wound. He denies any pain and no issues with the wrap.     Allergies  hibicleanse (Reaction: rash), ibuprofen (Reaction: nausea) INDIVIDUAL PROGRESS NOTES  8-25-20 patient returns today, no c/o pain, but some itchiness, wound is smaller, more granular, but has not fully filled in. due to the small size and depth remaining, will utilize epicord in an attempt to fill in granulation. Pain, Dyspnea on Exertion, Intermittent Claudication, Lower extremity (leg) resting pain  Musculoskeletal: Assistive Devices  Psychiatric: Memory Loss, Depression    Additional Information  Medication reconciliation completed at today's visit. : Yes Calm, cooperative, and communicative. Appropriate interactions and affect. Physical Exam Notes  blood sugars elevated.         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 - RN visit  Return Appointment in 2 weeks.  - NP visit-if no response with epicord, will consider keracis (fish skin)    Misc/Additional Orders:  Supplement with a daily multivitamin  Inc

## 2020-09-15 ENCOUNTER — OFFICE VISIT (OUTPATIENT)
Dept: WOUND CARE | Facility: HOSPITAL | Age: 70
End: 2020-09-15
Attending: NURSE PRACTITIONER
Payer: MEDICARE

## 2020-09-15 DIAGNOSIS — M71.062 ABSCESS OF BURSA, LEFT KNEE: ICD-10-CM

## 2020-09-15 DIAGNOSIS — L97.806 NON-PRESSURE CHRONIC ULCER OF OTHER PART OF UNSPECIFIED LOWER LEG WITH BONE INVOLVEMENT WITHOUT EVIDENCE OF NECROSIS (HCC): Primary | ICD-10-CM

## 2020-09-15 DIAGNOSIS — T84.89XD OTHER SPECIFIED COMPLICATION OF INTERNAL ORTHOPEDIC PROSTHETIC DEVICES, IMPLANTS AND GRAFTS, SUBSEQUENT ENCOUNTER: ICD-10-CM

## 2020-09-15 DIAGNOSIS — L97.812 NON-PRESSURE CHRONIC ULCER OF OTHER PART OF RIGHT LOWER LEG WITH FAT LAYER EXPOSED (HCC): ICD-10-CM

## 2020-09-15 DIAGNOSIS — I89.0 LYMPHEDEMA, NOT ELSEWHERE CLASSIFIED: ICD-10-CM

## 2020-09-15 LAB — GLUCOSE BLD-MCNC: 201 MG/DL (ref 70–99)

## 2020-09-15 PROCEDURE — 29581 APPL MULTLAYER CMPRN SYS LEG: CPT

## 2020-09-15 PROCEDURE — 82962 GLUCOSE BLOOD TEST: CPT

## 2020-09-21 NOTE — HOME CARE LIAISON
Received referral from [place], [place]    . Met with patient at the bedside to discuss home health services and offer choice. Patient is agreeable to [facility] services at discharge. Brochure and contact information provided. Any questions addressed. Will follow. no edema, no murmurs, regular rate and rhythm

## 2020-09-22 ENCOUNTER — OFFICE VISIT (OUTPATIENT)
Dept: WOUND CARE | Facility: HOSPITAL | Age: 70
End: 2020-09-22
Attending: NURSE PRACTITIONER
Payer: MEDICARE

## 2020-09-22 DIAGNOSIS — L97.812 NON-PRESSURE CHRONIC ULCER OF OTHER PART OF RIGHT LOWER LEG WITH FAT LAYER EXPOSED (HCC): ICD-10-CM

## 2020-09-22 DIAGNOSIS — L97.806 NON-PRESSURE CHRONIC ULCER OF OTHER PART OF UNSPECIFIED LOWER LEG WITH BONE INVOLVEMENT WITHOUT EVIDENCE OF NECROSIS (HCC): ICD-10-CM

## 2020-09-22 DIAGNOSIS — M71.062 ABSCESS OF BURSA, LEFT KNEE: Primary | ICD-10-CM

## 2020-09-22 LAB — GLUCOSE BLD-MCNC: 307 MG/DL (ref 70–99)

## 2020-09-22 PROCEDURE — 82962 GLUCOSE BLOOD TEST: CPT

## 2020-09-22 PROCEDURE — 99214 OFFICE O/P EST MOD 30 MIN: CPT

## 2020-09-22 NOTE — PROGRESS NOTES
Subjective    Chief Complaint  This information was obtained from the patient  Patient is here for a wound care follow up. He states that the wrap fell and caused a new wound on his leg.     Allergies  hibicleanse (Reaction: rash), ibuprofen (Reaction: naus HPI PRIOR TO SEPT 2020 PLEASE SEE INDIVIDUAL PROGRESS NOTES  9-1-20 patient returns today, no c/o pain, there is still epicord remaining in the base from last week, we will not disturb it and allow it to continue integrating, continue with compression.  no Cardiovascular (Central/Peripheral): Chest Pain, Dyspnea on Exertion, Intermittent Claudication, Lower extremity (leg) resting pain  Musculoskeletal: Assistive Devices  Psychiatric: Memory Loss, Depression    Additional Information  Medication reconciliati Assessment    Active Problems    ICD-10  (Encounter Diagnosis) L97.806 - Non-pressure chronic ulcer of other part of unspecified lower leg with bone involvement without evidence of necrosis  (Encounter Diagnosis) L97.812 - Non-pressure chronic ulcer of oth will trial coloplast paste for the next week. perhaps simpler will be more effective. patient to manage his edema with his farrow wraps and his pumps.     Electronic Signature(s)  Signed By: Date:  Darrell GONSALVES, MARCELA-AP, CFBECKIE, CSWS, [address] [place] [address],[address], [facility] 09/22/

## 2020-09-29 ENCOUNTER — OFFICE VISIT (OUTPATIENT)
Dept: WOUND CARE | Facility: HOSPITAL | Age: 70
End: 2020-09-29
Attending: NURSE PRACTITIONER
Payer: MEDICARE

## 2020-09-29 DIAGNOSIS — L97.806 NON-PRESSURE CHRONIC ULCER OF OTHER PART OF UNSPECIFIED LOWER LEG WITH BONE INVOLVEMENT WITHOUT EVIDENCE OF NECROSIS (HCC): Primary | ICD-10-CM

## 2020-09-29 DIAGNOSIS — I89.0 LYMPHEDEMA, NOT ELSEWHERE CLASSIFIED: ICD-10-CM

## 2020-09-29 DIAGNOSIS — L97.812 NON-PRESSURE CHRONIC ULCER OF OTHER PART OF RIGHT LOWER LEG WITH FAT LAYER EXPOSED (HCC): ICD-10-CM

## 2020-09-29 DIAGNOSIS — M71.062 ABSCESS OF BURSA, LEFT KNEE: ICD-10-CM

## 2020-09-29 DIAGNOSIS — T84.89XD OTHER SPECIFIED COMPLICATION OF INTERNAL ORTHOPEDIC PROSTHETIC DEVICES, IMPLANTS AND GRAFTS, SUBSEQUENT ENCOUNTER: ICD-10-CM

## 2020-09-29 PROCEDURE — 99214 OFFICE O/P EST MOD 30 MIN: CPT

## 2020-09-29 PROCEDURE — 82962 GLUCOSE BLOOD TEST: CPT

## 2020-09-29 NOTE — PROGRESS NOTES
Subjective    Chief Complaint  This information was obtained from the patient  Patient is here for a wound care follow up. He states that the spandagrip did not work and made a tourniquet. He has been dressing the knee with rosidal and comprilan himself.  H patient does not wear any sort of knee brace. he has many listed allergies because the oral medications \"tear up his stomach\".     HPI PRIOR TO SEPT 2020 PLEASE SEE INDIVIDUAL PROGRESS NOTES  9-1-20 patient returns today, no c/o pain, there is still epi (osteoarthritis), Backache (neck)  Integumentary (Hair/Skin/Nails):  Other (s/p I&D for septic arthritis), Prone to Skin Tears (due to severe edema)  Neurological: Other (hx of seizure disorder, neuropathy)  Hematologic/Lymphatic: Bleeding / Clotting Disord Capillary Blood Glucose: 243 mg/dl. Physical Exam  Constitutional  bp wnl for patient. Pulse Regular and wnl for patient. Arch Pete Respirations easy and unlabored. Temperature wnl. elevated BMI. Appearance neat and clean. Appears in no acute distress.  Well nour intake  S/S of Infection  Non-adherence    Additional Orders:    Care summary  Reviewed and evaluated labs. - June 2020 bun 13, creat 0.94, gfr 82  Wound type: - s/p I&D septic bursitis  Wound no change. No s/s of infection.   Perfusion assessed by palpatio

## 2020-09-30 LAB — GLUCOSE BLD-MCNC: 243 MG/DL (ref 70–99)

## 2020-10-06 ENCOUNTER — OFFICE VISIT (OUTPATIENT)
Dept: WOUND CARE | Facility: HOSPITAL | Age: 70
End: 2020-10-06
Attending: NURSE PRACTITIONER
Payer: MEDICARE

## 2020-10-06 DIAGNOSIS — T84.89XD OTHER SPECIFIED COMPLICATION OF INTERNAL ORTHOPEDIC PROSTHETIC DEVICES, IMPLANTS AND GRAFTS, SUBSEQUENT ENCOUNTER: ICD-10-CM

## 2020-10-06 DIAGNOSIS — M71.062 ABSCESS OF BURSA, LEFT KNEE: ICD-10-CM

## 2020-10-06 DIAGNOSIS — L97.806 NON-PRESSURE CHRONIC ULCER OF OTHER PART OF UNSPECIFIED LOWER LEG WITH BONE INVOLVEMENT WITHOUT EVIDENCE OF NECROSIS (HCC): Primary | ICD-10-CM

## 2020-10-06 DIAGNOSIS — I89.0 LYMPHEDEMA, NOT ELSEWHERE CLASSIFIED: ICD-10-CM

## 2020-10-06 PROCEDURE — 29581 APPL MULTLAYER CMPRN SYS LEG: CPT

## 2020-10-06 PROCEDURE — 82962 GLUCOSE BLOOD TEST: CPT

## 2020-10-06 NOTE — PROGRESS NOTES
Subjective    Chief Complaint  This information was obtained from the patient  Patient is here for a follow up visit for left leg wound. Patient arrives with no compression to left leg. New wound noted to left lateral leg.     Allergies  hibicleansbronwyn (Mark Payment HPI PRIOR TO SEPT 2020 PLEASE SEE INDIVIDUAL PROGRESS NOTES  9-1-20 patient returns today, no c/o pain, there is still epicord remaining in the base from last week, we will not disturb it and allow it to continue integrating, continue with compression.  n 10-6-20 patient returns today, he had some issues with his farrow wraps and his lymphadema pump on sunday and he caused a new wound on his right lateral leg, therefore he presents today without any compression on.  the limb is very edematous, the wound is Wound #1 Left Knee is a chronic Full Thickness Surgical Wound and has received a status of Not Healed. Subsequent wound encounter measurements are 0.3cm length x 0.2cm width x 0.5cm depth, with an area of 0.06 sq cm and a volume of 0.03 cubic cm.  No tunnel Vital Signs Notes: Temp taken out front of hospital per covid protocol    Physical Exam  Constitutional  bp wnl for patient. Pulse Regular and wnl for patient. Jay Margi Respirations easy and unlabored. Temperature wnl. Weight normal for height. . Appearance neat an Maribeln - to thigh  Compression Pump @ ______ mmHg - USE YOUR PUMPS AT LEAST DAILY. we cannot heal your wound with the amount of edema you have  Avoid prolonged standing in one place. Elevate leg(s)as much as possible. Take you diuretics as directed.

## 2020-10-09 ENCOUNTER — OFFICE VISIT (OUTPATIENT)
Dept: WOUND CARE | Facility: HOSPITAL | Age: 70
End: 2020-10-09
Attending: NURSE PRACTITIONER
Payer: MEDICARE

## 2020-10-09 DIAGNOSIS — T84.89XD OTHER SPECIFIED COMPLICATION OF INTERNAL ORTHOPEDIC PROSTHETIC DEVICES, IMPLANTS AND GRAFTS, SUBSEQUENT ENCOUNTER: ICD-10-CM

## 2020-10-09 DIAGNOSIS — I89.0 LYMPHEDEMA, NOT ELSEWHERE CLASSIFIED: ICD-10-CM

## 2020-10-09 DIAGNOSIS — L97.806 NON-PRESSURE CHRONIC ULCER OF OTHER PART OF UNSPECIFIED LOWER LEG WITH BONE INVOLVEMENT WITHOUT EVIDENCE OF NECROSIS (HCC): Primary | ICD-10-CM

## 2020-10-09 DIAGNOSIS — M71.062 ABSCESS OF BURSA, LEFT KNEE: ICD-10-CM

## 2020-10-09 PROCEDURE — 29581 APPL MULTLAYER CMPRN SYS LEG: CPT

## 2020-10-09 PROCEDURE — 82962 GLUCOSE BLOOD TEST: CPT

## 2020-10-13 ENCOUNTER — OFFICE VISIT (OUTPATIENT)
Dept: WOUND CARE | Facility: HOSPITAL | Age: 70
End: 2020-10-13
Attending: NURSE PRACTITIONER
Payer: MEDICARE

## 2020-10-13 DIAGNOSIS — I89.0 LYMPHEDEMA, NOT ELSEWHERE CLASSIFIED: ICD-10-CM

## 2020-10-13 DIAGNOSIS — T84.89XD OTHER SPECIFIED COMPLICATION OF INTERNAL ORTHOPEDIC PROSTHETIC DEVICES, IMPLANTS AND GRAFTS, SUBSEQUENT ENCOUNTER: ICD-10-CM

## 2020-10-13 DIAGNOSIS — M71.062 ABSCESS OF BURSA, LEFT KNEE: ICD-10-CM

## 2020-10-13 DIAGNOSIS — L97.806 NON-PRESSURE CHRONIC ULCER OF OTHER PART OF UNSPECIFIED LOWER LEG WITH BONE INVOLVEMENT WITHOUT EVIDENCE OF NECROSIS (HCC): Primary | ICD-10-CM

## 2020-10-13 PROCEDURE — 82962 GLUCOSE BLOOD TEST: CPT

## 2020-10-13 PROCEDURE — 29581 APPL MULTLAYER CMPRN SYS LEG: CPT

## 2020-10-13 NOTE — PROGRESS NOTES
Subjective    Chief Complaint  This information was obtained from the patient  Patient is here for a wound care follow up. He denies any pain or new wound concerns.     Allergies  hibicleanse (Reaction: rash), ibuprofen (Reaction: nausea), Isothiazolinones PROGRESS NOTES  9-29-20 patient returns today, he received the coloplast in the mail but did not think it was correct so did not use it (it was indeed not correct-we will contact vivek).  no c/o pain, however patient had difficulty with the spandagrip tim arthritis), Prone to Skin Tears (due to severe edema)  Neurological: Other (hx of seizure disorder, neuropathy)  Hematologic/Lymphatic: Bleeding / Clotting Disorders (polycythemia)  Psychiatric: Anxiety (panic attacks-sleep study caused him panick attacks No undermining has been noted. There is a moderate amount of serous drainage noted which has no odor. The patient reports a wound pain of level 2/10. The wound margin is well defined. Wound bed has 1-25% slough, 51-75% pink, firm granulation.   The periwoun encounter  (Encounter Diagnosis) I89.0 - Lymphedema, not elsewhere classified  (Encounter Diagnosis) M71.062 - Abscess of bursa, left knee        Procedures    Wound #1  Wound #1 (Surgical Wound) is located on the left knee.  A Multi-Layer Compression Wrap should be scheduled. will utlize coloplast paste, continue with compression.      Electronic Signature(s)  Signed By: Date:  Evelyne GONSALVES, ALMA, BRITTANY, CSWS, WC, [facility] 10/13/2020 16:51:23       Entered By: Luisa Linares 27, [facility]

## 2020-10-16 ENCOUNTER — OFFICE VISIT (OUTPATIENT)
Dept: WOUND CARE | Facility: HOSPITAL | Age: 70
End: 2020-10-16
Attending: NURSE PRACTITIONER
Payer: MEDICARE

## 2020-10-16 DIAGNOSIS — I89.0 LYMPHEDEMA, NOT ELSEWHERE CLASSIFIED: ICD-10-CM

## 2020-10-16 DIAGNOSIS — T84.89XD OTHER SPECIFIED COMPLICATION OF INTERNAL ORTHOPEDIC PROSTHETIC DEVICES, IMPLANTS AND GRAFTS, SUBSEQUENT ENCOUNTER: ICD-10-CM

## 2020-10-16 DIAGNOSIS — M71.062 ABSCESS OF BURSA, LEFT KNEE: ICD-10-CM

## 2020-10-16 DIAGNOSIS — L97.806 NON-PRESSURE CHRONIC ULCER OF OTHER PART OF UNSPECIFIED LOWER LEG WITH BONE INVOLVEMENT WITHOUT EVIDENCE OF NECROSIS (HCC): Primary | ICD-10-CM

## 2020-10-16 PROCEDURE — 29581 APPL MULTLAYER CMPRN SYS LEG: CPT

## 2020-10-16 PROCEDURE — 82962 GLUCOSE BLOOD TEST: CPT

## 2020-10-20 ENCOUNTER — OFFICE VISIT (OUTPATIENT)
Dept: WOUND CARE | Facility: HOSPITAL | Age: 70
End: 2020-10-20
Attending: NURSE PRACTITIONER
Payer: MEDICARE

## 2020-10-20 DIAGNOSIS — I89.0 LYMPHEDEMA, NOT ELSEWHERE CLASSIFIED: Primary | ICD-10-CM

## 2020-10-20 DIAGNOSIS — M71.062 ABSCESS OF BURSA, LEFT KNEE: ICD-10-CM

## 2020-10-20 DIAGNOSIS — L97.806 NON-PRESSURE CHRONIC ULCER OF OTHER PART OF UNSPECIFIED LOWER LEG WITH BONE INVOLVEMENT WITHOUT EVIDENCE OF NECROSIS (HCC): ICD-10-CM

## 2020-10-20 DIAGNOSIS — T84.89XD OTHER SPECIFIED COMPLICATION OF INTERNAL ORTHOPEDIC PROSTHETIC DEVICES, IMPLANTS AND GRAFTS, SUBSEQUENT ENCOUNTER: ICD-10-CM

## 2020-10-20 PROCEDURE — 29581 APPL MULTLAYER CMPRN SYS LEG: CPT

## 2020-10-20 PROCEDURE — 82962 GLUCOSE BLOOD TEST: CPT

## 2020-10-20 NOTE — PROGRESS NOTES
Subjective    Chief Complaint  This information was obtained from the patient  Patient is here for a wound care follow up. He denies any pain to the wound at this time, complains of discomfort to the side of the leg he believes its from the wraps.  No other the oral medications \"tear up his stomach\".     HPI PRIOR TO OCTOBER 2020 PLEASE SEE INDIVIDUAL PROGRESS NOTES  10-6-20 patient returns today, he had some issues with his farrow wraps and his lymphadema pump on sunday and he caused a new wound on his righ (polycythemia)  Psychiatric: Anxiety (panic attacks-sleep study caused him panick attacks x 6 weeks), Nervousness / Tension    Patient denies complaints or symptoms related to:  Constitutional Symptoms (General Health):  Fatigue, Fever, Loss of Appetite, Ma reports a wound pain of level 0/10. The wound margin is flat and intact. Wound bed has % pink, firm granulation. The periwound skin exhibited: Brawny Induration, Edema, Dry/Scaly, Hemosiderosis. The temperature of the periwound skin is Warm.  Jenna Curtis #1  Wound #1 (Surgical Wound) is located on the left knee. A Multi-Layer Compression Wrap procedure was performed. A Multi-Layer Comprilan was applied with high 30-40 mmhg. The procedure was tolerated well.         Plan    Wound Orders:  Wound #1 Left Knee ASHKAN, [facility] on 10/20/2020 15:03:03

## 2020-10-23 ENCOUNTER — OFFICE VISIT (OUTPATIENT)
Dept: WOUND CARE | Facility: HOSPITAL | Age: 70
End: 2020-10-23
Attending: NURSE PRACTITIONER
Payer: MEDICARE

## 2020-10-23 DIAGNOSIS — T84.89XD OTHER SPECIFIED COMPLICATION OF INTERNAL ORTHOPEDIC PROSTHETIC DEVICES, IMPLANTS AND GRAFTS, SUBSEQUENT ENCOUNTER: ICD-10-CM

## 2020-10-23 DIAGNOSIS — I89.0 LYMPHEDEMA, NOT ELSEWHERE CLASSIFIED: ICD-10-CM

## 2020-10-23 DIAGNOSIS — M71.062 ABSCESS OF BURSA, LEFT KNEE: ICD-10-CM

## 2020-10-23 DIAGNOSIS — L97.806 NON-PRESSURE CHRONIC ULCER OF OTHER PART OF UNSPECIFIED LOWER LEG WITH BONE INVOLVEMENT WITHOUT EVIDENCE OF NECROSIS (HCC): Primary | ICD-10-CM

## 2020-10-23 PROCEDURE — 29581 APPL MULTLAYER CMPRN SYS LEG: CPT

## 2020-10-23 PROCEDURE — 82962 GLUCOSE BLOOD TEST: CPT

## 2020-10-27 ENCOUNTER — OFFICE VISIT (OUTPATIENT)
Dept: WOUND CARE | Facility: HOSPITAL | Age: 70
End: 2020-10-27
Attending: NURSE PRACTITIONER
Payer: MEDICARE

## 2020-10-27 DIAGNOSIS — M86.48 CHRONIC OSTEOMYELITIS WITH DRAINING SINUS, OTHER SITE (HCC): ICD-10-CM

## 2020-10-27 DIAGNOSIS — T84.89XD OTHER SPECIFIED COMPLICATION OF INTERNAL ORTHOPEDIC PROSTHETIC DEVICES, IMPLANTS AND GRAFTS, SUBSEQUENT ENCOUNTER: ICD-10-CM

## 2020-10-27 DIAGNOSIS — L97.806 NON-PRESSURE CHRONIC ULCER OF OTHER PART OF UNSPECIFIED LOWER LEG WITH BONE INVOLVEMENT WITHOUT EVIDENCE OF NECROSIS (HCC): Primary | ICD-10-CM

## 2020-10-27 DIAGNOSIS — M71.062 ABSCESS OF BURSA, LEFT KNEE: ICD-10-CM

## 2020-10-27 PROCEDURE — 82962 GLUCOSE BLOOD TEST: CPT

## 2020-10-27 PROCEDURE — 29581 APPL MULTLAYER CMPRN SYS LEG: CPT

## 2020-10-27 NOTE — PROGRESS NOTES
Subjective    Chief Complaint  This information was obtained from the patient  Patient is here for a follow up visit. Patients denies any new concerns or pain.     Allergies  hibicleanse (Reaction: rash), ibuprofen (Reaction: nausea), Isothiazolinones (Reac HPI PRIOR TO OCTOBER 2020 PLEASE SEE INDIVIDUAL PROGRESS NOTES  10-6-20 patient returns today, he had some issues with his farrow wraps and his lymphadema pump on sunday and he caused a new wound on his right lateral leg, therefore he presents today withou Review of Systems (ROS)  This information was obtained from the patient, chart    Complaints and Symptoms  Patient complains of:  Respiratory: Other (cold air induced asthma, untreated BRIGITTE)  Cardiovascular (Central/Peripheral): Edema (lymphedema), Other (h The periwound skin moisture is normal. The periwound skin exhibited: Edema, Hemosiderosis. The periwound skin did not exhibit: Brawny Induration, Ecchymosis, Erythema. The temperature of the periwound skin is Warm.  Periwound skin does not exhibit signs or (Encounter Diagnosis) L97.806 - Non-pressure chronic ulcer of other part of unspecified lower leg with bone involvement without evidence of necrosis  (Encounter Diagnosis) M86.48 - Chronic osteomyelitis with draining sinus, other site  (Encounter Diagnosis d/w patient I am concerned that there is something else going on \"deeper\" the knee.   I would like him to return to dr. Luke Esqueda or other ortho and perhaps coordinate care with plastics and or ID.  we will continue to manage drainage and his lymphadema until

## 2020-10-29 ENCOUNTER — OFFICE VISIT (OUTPATIENT)
Dept: WOUND CARE | Facility: HOSPITAL | Age: 70
End: 2020-10-29
Attending: NURSE PRACTITIONER
Payer: MEDICARE

## 2020-10-29 DIAGNOSIS — I89.0 LYMPHEDEMA, NOT ELSEWHERE CLASSIFIED: ICD-10-CM

## 2020-10-29 DIAGNOSIS — M71.062 ABSCESS OF BURSA, LEFT KNEE: ICD-10-CM

## 2020-10-29 DIAGNOSIS — T84.89XD OTHER SPECIFIED COMPLICATION OF INTERNAL ORTHOPEDIC PROSTHETIC DEVICES, IMPLANTS AND GRAFTS, SUBSEQUENT ENCOUNTER: ICD-10-CM

## 2020-10-29 DIAGNOSIS — M86.48 CHRONIC OSTEOMYELITIS WITH DRAINING SINUS, OTHER SITE (HCC): ICD-10-CM

## 2020-10-29 DIAGNOSIS — L97.806 NON-PRESSURE CHRONIC ULCER OF OTHER PART OF UNSPECIFIED LOWER LEG WITH BONE INVOLVEMENT WITHOUT EVIDENCE OF NECROSIS (HCC): Primary | ICD-10-CM

## 2020-10-29 PROCEDURE — 29581 APPL MULTLAYER CMPRN SYS LEG: CPT

## 2020-10-29 PROCEDURE — 82962 GLUCOSE BLOOD TEST: CPT

## 2020-11-03 ENCOUNTER — OFFICE VISIT (OUTPATIENT)
Dept: WOUND CARE | Facility: HOSPITAL | Age: 70
End: 2020-11-03
Attending: NURSE PRACTITIONER
Payer: MEDICARE

## 2020-11-03 DIAGNOSIS — L97.806 NON-PRESSURE CHRONIC ULCER OF OTHER PART OF UNSPECIFIED LOWER LEG WITH BONE INVOLVEMENT WITHOUT EVIDENCE OF NECROSIS (HCC): Primary | ICD-10-CM

## 2020-11-03 DIAGNOSIS — I89.0 LYMPHEDEMA, NOT ELSEWHERE CLASSIFIED: ICD-10-CM

## 2020-11-03 DIAGNOSIS — M86.48 CHRONIC OSTEOMYELITIS WITH DRAINING SINUS, OTHER SITE (HCC): ICD-10-CM

## 2020-11-03 PROCEDURE — 82962 GLUCOSE BLOOD TEST: CPT

## 2020-11-03 PROCEDURE — 29581 APPL MULTLAYER CMPRN SYS LEG: CPT

## 2020-11-03 NOTE — PROGRESS NOTES
Subjective    Chief Complaint  This information was obtained from the patient  Patient is here for a wound care follow up. He denies any current pain to the wound.     Allergies  hibicleanse (Reaction: rash), ibuprofen (Reaction: nausea), Isothiazolinones ( HPI PRIOR TO NOVEMBER 2020 PLEASE SEE INDIVIDUAL PROGRESS NOTES  10-20-20 patient returns today, the wound remains open, the wrap has fallen slightly on his thigh but his edema is still adequately controlled, no s/s of infection, therion #2 placed today, w Neurological: Other (hx of seizure disorder, neuropathy)  Hematologic/Lymphatic: Bleeding / Clotting Disorders (polycythemia)  Psychiatric: Anxiety (panic attacks-sleep study caused him panick attacks x 6 weeks), Nervousness / Tension    Patient denies com bp wnl for patient. Pulse Regular and wnl for patient. Arch Pete Respirations easy and unlabored. Temperature wnl. Overweight. Appearance neat and clean. Appears in no acute distress. Well nourished and well developed. Musculoskeletal:  Gait and station stable. appointment with pcp this thursday nov 5  appointment with dr Jhonathan Giron November 17.     Misc/Additional Orders:  Supplement with a daily multivitamin  Increase protein into diet  Start or continue taking Kaushal  Decrease salt intake  S/S of Infection  Non-adhe

## 2020-11-05 PROBLEM — G90.522 COMPLEX REGIONAL PAIN SYNDROME TYPE 1 OF LEFT LOWER EXTREMITY: Status: ACTIVE | Noted: 2019-11-12

## 2020-11-05 PROBLEM — R51.9 FACIAL PAIN: Status: RESOLVED | Noted: 2018-06-06 | Resolved: 2020-11-05

## 2020-11-05 PROBLEM — G90.522 COMPLEX REGIONAL PAIN SYNDROME TYPE 1 OF LEFT LOWER EXTREMITY: Status: RESOLVED | Noted: 2019-11-12 | Resolved: 2020-11-05

## 2020-11-05 PROBLEM — K85.90 ACUTE PANCREATITIS, UNSPECIFIED COMPLICATION STATUS, UNSPECIFIED PANCREATITIS TYPE (HCC): Status: RESOLVED | Noted: 2019-06-19 | Resolved: 2020-11-05

## 2020-11-05 PROBLEM — M54.2 NECK PAIN: Status: RESOLVED | Noted: 2019-10-01 | Resolved: 2020-11-05

## 2020-11-05 PROBLEM — R09.82 POST-NASAL DRIP: Status: RESOLVED | Noted: 2018-06-06 | Resolved: 2020-11-05

## 2020-11-05 PROBLEM — M53.3 SACROILIAC PAIN: Status: RESOLVED | Noted: 2017-08-15 | Resolved: 2020-11-05

## 2020-11-05 PROBLEM — R73.9 HYPERGLYCEMIA: Status: RESOLVED | Noted: 2019-06-19 | Resolved: 2020-11-05

## 2020-11-05 PROBLEM — K85.90 ACUTE PANCREATITIS: Status: RESOLVED | Noted: 2019-06-19 | Resolved: 2020-11-05

## 2020-11-05 PROBLEM — K85.90 ACUTE PANCREATITIS (HCC): Status: RESOLVED | Noted: 2019-06-19 | Resolved: 2020-11-05

## 2020-11-05 PROBLEM — J01.21 ACUTE RECURRENT ETHMOIDAL SINUSITIS: Status: RESOLVED | Noted: 2018-06-06 | Resolved: 2020-11-05

## 2020-11-05 PROBLEM — K85.90 ACUTE PANCREATITIS, UNSPECIFIED COMPLICATION STATUS, UNSPECIFIED PANCREATITIS TYPE: Status: RESOLVED | Noted: 2019-06-19 | Resolved: 2020-11-05

## 2020-11-05 PROBLEM — D72.829 LEUKOCYTOSIS, UNSPECIFIED TYPE: Status: RESOLVED | Noted: 2019-06-19 | Resolved: 2020-11-05

## 2020-11-09 ENCOUNTER — OFFICE VISIT (OUTPATIENT)
Dept: WOUND CARE | Facility: HOSPITAL | Age: 70
End: 2020-11-09
Attending: NURSE PRACTITIONER
Payer: MEDICARE

## 2020-11-09 DIAGNOSIS — M86.48 CHRONIC OSTEOMYELITIS WITH DRAINING SINUS, OTHER SITE (HCC): ICD-10-CM

## 2020-11-09 DIAGNOSIS — L97.806 NON-PRESSURE CHRONIC ULCER OF OTHER PART OF UNSPECIFIED LOWER LEG WITH BONE INVOLVEMENT WITHOUT EVIDENCE OF NECROSIS (HCC): Primary | ICD-10-CM

## 2020-11-09 DIAGNOSIS — M71.062 ABSCESS OF BURSA, LEFT KNEE: ICD-10-CM

## 2020-11-09 DIAGNOSIS — I89.0 LYMPHEDEMA, NOT ELSEWHERE CLASSIFIED: ICD-10-CM

## 2020-11-09 DIAGNOSIS — T84.89XD OTHER SPECIFIED COMPLICATION OF INTERNAL ORTHOPEDIC PROSTHETIC DEVICES, IMPLANTS AND GRAFTS, SUBSEQUENT ENCOUNTER: ICD-10-CM

## 2020-11-09 PROCEDURE — 82962 GLUCOSE BLOOD TEST: CPT

## 2020-11-09 PROCEDURE — 29581 APPL MULTLAYER CMPRN SYS LEG: CPT

## 2020-11-13 ENCOUNTER — OFFICE VISIT (OUTPATIENT)
Dept: WOUND CARE | Facility: HOSPITAL | Age: 70
End: 2020-11-13
Attending: NURSE PRACTITIONER
Payer: MEDICARE

## 2020-11-13 DIAGNOSIS — M86.48 CHRONIC OSTEOMYELITIS WITH DRAINING SINUS, OTHER SITE (HCC): ICD-10-CM

## 2020-11-13 DIAGNOSIS — I89.0 LYMPHEDEMA, NOT ELSEWHERE CLASSIFIED: ICD-10-CM

## 2020-11-13 DIAGNOSIS — L97.806 NON-PRESSURE CHRONIC ULCER OF OTHER PART OF UNSPECIFIED LOWER LEG WITH BONE INVOLVEMENT WITHOUT EVIDENCE OF NECROSIS (HCC): Primary | ICD-10-CM

## 2020-11-13 PROCEDURE — 29581 APPL MULTLAYER CMPRN SYS LEG: CPT

## 2020-11-13 PROCEDURE — 82962 GLUCOSE BLOOD TEST: CPT

## 2020-11-13 NOTE — PROGRESS NOTES
Subjective    Chief Complaint  This information was obtained from the patient  Patient is here for a follow up of left knee wound.     Allergies  hibicleanse (Reaction: rash), ibuprofen (Reaction: nausea), Isothiazolinones (Reaction: swelling), prednisone ( 10-27-20 patient returns today, wound continues to remain open, edema is well controlled, the lateral leg is resolved.  no acute s/s of infection but wound is not progressing, I feel there is most likely a deeper structure issue going on.  patient states th Respiratory: Other (cold air induced asthma, untreated BRIGITTE)  Cardiovascular (Central/Peripheral): Edema (lymphedema), Other (hl, htn)  Musculoskeletal: Joint Pain (osteoarthritis), Backache (neck)  Integumentary (Hair/Skin/Nails):  Other (s/p I&D for septic Height/Length: 65 in (165.1 cm), Weight: 260 lbs (118.18 kgs), BMI: 43.3, Temperature: 97.4 °F (36.33 °C), Pulse: 94 bpm, Respiratory Rate: 16 breaths/min, Blood Pressure: 117/72 mmHg, Capillary Blood Glucose: 238 mg/dl.     Physical Exam  Constitutional  b Wound #1 (Surgical Wound) is located on the left knee. A Multi-Layer Compression Wrap procedure was performed. A Multi-Layer Comprilan was applied with high 30-40 mmhg. The procedure was tolerated well.         Plan    Wound Orders:  Wound #1 Left Knee    W Homa Worthington, ALMA, BRITTANY, ASHKAN, CLARK, [facility] 11/13/2020 13:29:16       Entered By: Hawk GONSALVES, ALMA, BRITTANY, ASHKAN, GABYC, [facility] on 11/13/2020 13:28:53

## 2020-11-17 ENCOUNTER — OFFICE VISIT (OUTPATIENT)
Dept: WOUND CARE | Facility: HOSPITAL | Age: 70
End: 2020-11-17
Attending: NURSE PRACTITIONER
Payer: MEDICARE

## 2020-11-17 ENCOUNTER — OFFICE VISIT (OUTPATIENT)
Dept: SURGERY | Facility: CLINIC | Age: 70
End: 2020-11-17
Payer: MEDICARE

## 2020-11-17 VITALS
RESPIRATION RATE: 16 BRPM | DIASTOLIC BLOOD PRESSURE: 72 MMHG | BODY MASS INDEX: 43.59 KG/M2 | WEIGHT: 261.63 LBS | HEIGHT: 65 IN | SYSTOLIC BLOOD PRESSURE: 117 MMHG | TEMPERATURE: 98 F | OXYGEN SATURATION: 96 % | HEART RATE: 72 BPM

## 2020-11-17 DIAGNOSIS — I89.0 LYMPHEDEMA, NOT ELSEWHERE CLASSIFIED: ICD-10-CM

## 2020-11-17 DIAGNOSIS — M71.062 ABSCESS OF BURSA, LEFT KNEE: ICD-10-CM

## 2020-11-17 DIAGNOSIS — Z96.652 STATUS POST TOTAL LEFT KNEE REPLACEMENT: ICD-10-CM

## 2020-11-17 DIAGNOSIS — M86.48 CHRONIC OSTEOMYELITIS WITH DRAINING SINUS, OTHER SITE (HCC): ICD-10-CM

## 2020-11-17 DIAGNOSIS — L97.806 NON-PRESSURE CHRONIC ULCER OF OTHER PART OF UNSPECIFIED LOWER LEG WITH BONE INVOLVEMENT WITHOUT EVIDENCE OF NECROSIS (HCC): Primary | ICD-10-CM

## 2020-11-17 DIAGNOSIS — Z47.89 ORTHOPEDIC AFTERCARE: Primary | ICD-10-CM

## 2020-11-17 DIAGNOSIS — T84.89XD OTHER SPECIFIED COMPLICATION OF INTERNAL ORTHOPEDIC PROSTHETIC DEVICES, IMPLANTS AND GRAFTS, SUBSEQUENT ENCOUNTER: ICD-10-CM

## 2020-11-17 PROCEDURE — 82962 GLUCOSE BLOOD TEST: CPT

## 2020-11-17 PROCEDURE — 29581 APPL MULTLAYER CMPRN SYS LEG: CPT

## 2020-11-17 PROCEDURE — 99202 OFFICE O/P NEW SF 15 MIN: CPT | Performed by: SURGERY

## 2020-11-17 NOTE — CONSULTS
New Patient Consultation    This is the first visit for this 79year old referred for evaluation of a chronic wound of his left knee. History of Present Illness:    The patient is a 79year old referred by Cleavon Seip, APRN for evaluation of a wound of COLONOSCOPY, POSSIBLE BIOPSY, POSSIBLE POLYPECTOMY 33110 N/A 1/25/2018    Performed by Mary Whitlock MD at [address], POSSIBLE BIOPSY, POSSIBLE POLYPECTOMY 29097 N/A 1/10/2014    Performed by Shantel Larkin MD at [place] [address] 24 Hr, Take 2 tablets (1,000 mg total) by mouth every morning AND 1 tablet (500 mg total) every evening., Disp: 270 tablet, Rfl: 0    •  Empagliflozin (JARDIANCE) 10 MG Oral Tab, Take 1 tablet by mouth every morning., Disp: 90 tablet, Rfl: 0    •  ALPRAZol Comment:Fluid retention  Quaternium-15           SWELLING  Tetracycline Base       ANAPHYLAXIS  Acetaminophen           PAIN    Comment:LIVER PAIN  Cipro [Ciprofloxaci*    DIZZINESS  Lisinopril              OTHER (SEE COMMENTS)    Comment:pancreatitis  Adv wheezing    Cardiovascular:   The patient denies chest pain/pressure, palpitations, irregular heartbeat, high blood pressure, stroke, or +left leg swelling secondary to lymphedema    Breasts:  Patient denies breast masses, pain, change in the breast skin, s awake, alert, and oriented. He is a well-nourished, obese male who appears his stated age. His speech patterns and movements are normal, and affect is appropriate. HEENT: The head is normocephalic. The neck is supple.  The thyroid is not enlarged and is discussed the option of operative exploration with Dr. Deuce Garcia. We briefly discussed options for wound closure including primary closure, local tissue rearrangement versus a gastrocnemius muscle flap.   Multiple questions were answered the patient satisfacti

## 2020-11-24 ENCOUNTER — OFFICE VISIT (OUTPATIENT)
Dept: WOUND CARE | Facility: HOSPITAL | Age: 70
End: 2020-11-24
Attending: NURSE PRACTITIONER
Payer: MEDICARE

## 2020-11-24 DIAGNOSIS — I89.0 LYMPHEDEMA, NOT ELSEWHERE CLASSIFIED: ICD-10-CM

## 2020-11-24 DIAGNOSIS — M71.062 ABSCESS OF BURSA, LEFT KNEE: ICD-10-CM

## 2020-11-24 DIAGNOSIS — M86.48 CHRONIC OSTEOMYELITIS WITH DRAINING SINUS, OTHER SITE (HCC): ICD-10-CM

## 2020-11-24 DIAGNOSIS — T84.89XD OTHER SPECIFIED COMPLICATION OF INTERNAL ORTHOPEDIC PROSTHETIC DEVICES, IMPLANTS AND GRAFTS, SUBSEQUENT ENCOUNTER: ICD-10-CM

## 2020-11-24 DIAGNOSIS — L97.806 NON-PRESSURE CHRONIC ULCER OF OTHER PART OF UNSPECIFIED LOWER LEG WITH BONE INVOLVEMENT WITHOUT EVIDENCE OF NECROSIS (HCC): Primary | ICD-10-CM

## 2020-11-24 PROCEDURE — 82962 GLUCOSE BLOOD TEST: CPT

## 2020-11-24 PROCEDURE — 99213 OFFICE O/P EST LOW 20 MIN: CPT

## 2021-02-02 PROBLEM — E87.6 HYPOKALEMIA: Status: RESOLVED | Noted: 2019-06-19 | Resolved: 2021-02-02

## 2021-02-02 PROBLEM — M25.562 CHRONIC PAIN OF LEFT KNEE: Status: RESOLVED | Noted: 2018-02-26 | Resolved: 2021-02-02

## 2021-02-02 PROBLEM — G89.29 CHRONIC PAIN OF LEFT KNEE: Status: RESOLVED | Noted: 2018-02-26 | Resolved: 2021-02-02

## 2021-02-02 PROBLEM — D72.829 LEUKOCYTOSIS: Status: RESOLVED | Noted: 2019-06-19 | Resolved: 2021-02-02

## 2021-06-01 ENCOUNTER — HOSPITAL ENCOUNTER (EMERGENCY)
Facility: HOSPITAL | Age: 71
Discharge: HOME | End: 2021-06-01
Attending: EMERGENCY MEDICINE
Payer: MEDICARE

## 2021-06-01 ENCOUNTER — APPOINTMENT (OUTPATIENT)
Dept: CT IMAGING | Facility: HOSPITAL | Age: 71
End: 2021-06-01
Attending: EMERGENCY MEDICINE
Payer: MEDICARE

## 2021-06-01 VITALS
WEIGHT: 289.88 LBS | HEART RATE: 104 BPM | SYSTOLIC BLOOD PRESSURE: 141 MMHG | RESPIRATION RATE: 22 BRPM | OXYGEN SATURATION: 94 % | DIASTOLIC BLOOD PRESSURE: 73 MMHG | BODY MASS INDEX: 45.5 KG/M2 | HEIGHT: 67 IN | TEMPERATURE: 98 F

## 2021-06-01 DIAGNOSIS — K52.9 GASTROENTERITIS: Primary | ICD-10-CM

## 2021-06-01 PROCEDURE — 96361 HYDRATE IV INFUSION ADD-ON: CPT

## 2021-06-01 PROCEDURE — 93005 ELECTROCARDIOGRAM TRACING: CPT

## 2021-06-01 PROCEDURE — 96376 TX/PRO/DX INJ SAME DRUG ADON: CPT

## 2021-06-01 PROCEDURE — 81003 URINALYSIS AUTO W/O SCOPE: CPT | Performed by: EMERGENCY MEDICINE

## 2021-06-01 PROCEDURE — 83690 ASSAY OF LIPASE: CPT | Performed by: EMERGENCY MEDICINE

## 2021-06-01 PROCEDURE — 93010 ELECTROCARDIOGRAM REPORT: CPT

## 2021-06-01 PROCEDURE — 99285 EMERGENCY DEPT VISIT HI MDM: CPT

## 2021-06-01 PROCEDURE — 85025 COMPLETE CBC W/AUTO DIFF WBC: CPT | Performed by: EMERGENCY MEDICINE

## 2021-06-01 PROCEDURE — 99284 EMERGENCY DEPT VISIT MOD MDM: CPT

## 2021-06-01 PROCEDURE — 96375 TX/PRO/DX INJ NEW DRUG ADDON: CPT

## 2021-06-01 PROCEDURE — 96374 THER/PROPH/DIAG INJ IV PUSH: CPT

## 2021-06-01 PROCEDURE — 80053 COMPREHEN METABOLIC PANEL: CPT | Performed by: EMERGENCY MEDICINE

## 2021-06-01 PROCEDURE — 74177 CT ABD & PELVIS W/CONTRAST: CPT | Performed by: EMERGENCY MEDICINE

## 2021-06-01 RX ORDER — DICYCLOMINE HCL 20 MG
20 TABLET ORAL 4 TIMES DAILY PRN
Qty: 15 TABLET | Refills: 0 | Status: SHIPPED | OUTPATIENT
Start: 2021-06-01 | End: 2021-06-03

## 2021-06-01 RX ORDER — SODIUM CHLORIDE 9 MG/ML
INJECTION, SOLUTION INTRAVENOUS CONTINUOUS
Status: DISCONTINUED | OUTPATIENT
Start: 2021-06-01 | End: 2021-06-01

## 2021-06-01 RX ORDER — ONDANSETRON 4 MG/1
4 TABLET, ORALLY DISINTEGRATING ORAL EVERY 4 HOURS PRN
Qty: 10 TABLET | Refills: 0 | Status: SHIPPED | OUTPATIENT
Start: 2021-06-01 | End: 2021-06-08

## 2021-06-01 RX ORDER — ONDANSETRON 2 MG/ML
4 INJECTION INTRAMUSCULAR; INTRAVENOUS ONCE
Status: COMPLETED | OUTPATIENT
Start: 2021-06-01 | End: 2021-06-01

## 2021-06-01 RX ORDER — HYDROMORPHONE HYDROCHLORIDE 1 MG/ML
0.5 INJECTION, SOLUTION INTRAMUSCULAR; INTRAVENOUS; SUBCUTANEOUS ONCE
Status: COMPLETED | OUTPATIENT
Start: 2021-06-01 | End: 2021-06-01

## 2021-06-03 ENCOUNTER — HOSPITAL ENCOUNTER (EMERGENCY)
Facility: HOSPITAL | Age: 71
Discharge: HOME | End: 2021-06-04
Attending: EMERGENCY MEDICINE
Payer: MEDICARE

## 2021-06-03 DIAGNOSIS — R19.7 DIARRHEA, UNSPECIFIED TYPE: ICD-10-CM

## 2021-06-03 DIAGNOSIS — R10.9 ABDOMINAL PAIN, ACUTE: Primary | ICD-10-CM

## 2021-06-03 PROCEDURE — 96361 HYDRATE IV INFUSION ADD-ON: CPT

## 2021-06-03 PROCEDURE — 85025 COMPLETE CBC W/AUTO DIFF WBC: CPT | Performed by: EMERGENCY MEDICINE

## 2021-06-03 PROCEDURE — 83690 ASSAY OF LIPASE: CPT | Performed by: EMERGENCY MEDICINE

## 2021-06-03 PROCEDURE — 80053 COMPREHEN METABOLIC PANEL: CPT | Performed by: EMERGENCY MEDICINE

## 2021-06-03 PROCEDURE — 99284 EMERGENCY DEPT VISIT MOD MDM: CPT

## 2021-06-03 PROCEDURE — 96360 HYDRATION IV INFUSION INIT: CPT

## 2021-06-03 PROCEDURE — 99285 EMERGENCY DEPT VISIT HI MDM: CPT

## 2021-06-03 RX ORDER — SODIUM CHLORIDE 9 MG/ML
1000 INJECTION, SOLUTION INTRAVENOUS ONCE
Status: COMPLETED | OUTPATIENT
Start: 2021-06-03 | End: 2021-06-04

## 2021-06-04 ENCOUNTER — APPOINTMENT (OUTPATIENT)
Dept: CT IMAGING | Facility: HOSPITAL | Age: 71
End: 2021-06-04
Attending: EMERGENCY MEDICINE
Payer: MEDICARE

## 2021-06-04 ENCOUNTER — APPOINTMENT (OUTPATIENT)
Dept: ULTRASOUND IMAGING | Facility: HOSPITAL | Age: 71
End: 2021-06-04
Attending: EMERGENCY MEDICINE
Payer: MEDICARE

## 2021-06-04 VITALS
HEIGHT: 65 IN | BODY MASS INDEX: 46.65 KG/M2 | SYSTOLIC BLOOD PRESSURE: 123 MMHG | DIASTOLIC BLOOD PRESSURE: 65 MMHG | WEIGHT: 280 LBS | HEART RATE: 82 BPM | TEMPERATURE: 99 F | RESPIRATION RATE: 18 BRPM | OXYGEN SATURATION: 96 %

## 2021-06-04 PROCEDURE — 76700 US EXAM ABDOM COMPLETE: CPT | Performed by: EMERGENCY MEDICINE

## 2021-06-04 PROCEDURE — 74177 CT ABD & PELVIS W/CONTRAST: CPT | Performed by: EMERGENCY MEDICINE

## 2021-06-04 PROCEDURE — 81001 URINALYSIS AUTO W/SCOPE: CPT | Performed by: EMERGENCY MEDICINE

## 2021-06-04 RX ORDER — DICYCLOMINE HCL 20 MG
20 TABLET ORAL 4 TIMES DAILY PRN
Qty: 15 TABLET | Refills: 0 | Status: SHIPPED | OUTPATIENT
Start: 2021-06-04

## 2021-06-04 NOTE — ED INITIAL ASSESSMENT (HPI)
Patient nimo was seen here Tuesday for same complaint. abd pain, nausea and diarrhea. Patient sts he's starting to feel dehydrated and feels pain in his kidneys.

## 2021-06-04 NOTE — ED QUICK NOTES
Patient discharged to home with follow-up with PCP and bentyl prescription discussed. Patient AOx3 with no signs of acute distress.

## 2021-06-04 NOTE — ED PROVIDER NOTES
Patient Seen in: [facility] Emergency Department      History   Patient presents with:  Abdomen/Flank Pain  Nausea/Vomiting/Diarrhea    Stated Complaint: nausea, diarrhea, abd pain    HPI/Subjective:   HPI    Patient is 66-year-old male with previou BIOPSY, POSSIBLE POLYPECTOMY 91900;  Surgeon: Red Henriquez MD;  Location: [address] [place] [address]   • COLONOSCOPY N/A 6/26/2019    Procedure: COLONOSCOPY;  Surgeon: Red Henriquez MD;  Location: [facility] ENDOSCOPY   • COLONOSCOPY,BIOPSY  1/10/2014    Procedure: COL • INJECT NERV BARB,MOISÉS PERIPH NERV Bilateral 12/19/2014    Procedure: LUMBAR FACET INJECTION OR MEDIAL BRANCH NERVE BLOCK;  Surgeon: Kenji Walker MD;  Location: [facility] FOR PAIN MANAGEMENT   • INJECT NERV BARB,MOISÉS PERIPH NERV Bilateral 12/19/20 MANAGEMENT   • INJECT NERV BLCK,OTHR PERIPH NERV N/A 1/5/2015    Procedure: LUMBAR FACET INJECTION OR MEDIAL BRANCH NERVE BLOCK;  Surgeon: Ann Santos MD;  Location: [address] [place] [address] MANAGEMENT   • INJECT NERV BLCK,MOISÉS PERIPH NERV N/A 1/5/2015 12/19/2014    Procedure: LUMBAR FACET INJECTION OR MEDIAL BRANCH NERVE BLOCK;  Surgeon: Isaiah Vaughan MD;  Location: [facility] FOR PAIN MANAGEMENT   • M-SEDAJ BY [facility] [address] .[facility] [address]  [address] [facility] 5+ YR N/A 1/5/2015    Procedure: LUMBAR FACET INJECTION OR MEDIAL BRA 1/10/2014    Procedure: COLONOSCOPY, POSSIBLE BIOPSY, POSSIBLE POLYPECTOMY 06741;  Surgeon: Madisyn Lemus MD;  Location: [address] [place] [address]   • PATIENT [address] FOR IV ANTIBIOTIC SURGICAL SITE INFECTION PROPHYLAXIS.  1/27/2014 kg/m²         Physical Exam    GENERAL: Well-developed, well-nourished male sitting up breathing easily in no apparent distress. Patient is nontoxic in appearance. HEENT: Head is normocephalic, atraumatic.  Pupils are 3 mm equally round and reactive to li PLATELET    Narrative: The following orders were created for panel order CBC With Differential With Platelet.   Procedure                               Abnormality         Status                     ---------                               ----------- Patient will be discharged home as he is refusing this at this time. Patient given prescription for Bentyl instructions to encourage fluids and rest at home return to the ER if any other problems arise. Patient discharged home at this time.

## 2021-08-31 PROBLEM — M75.111 PARTIAL NONTRAUMATIC TEAR OF ROTATOR CUFF, RIGHT: Status: ACTIVE | Noted: 2021-08-31

## 2021-09-07 ENCOUNTER — HOSPITAL ENCOUNTER (OUTPATIENT)
Dept: MRI IMAGING | Age: 71
Discharge: HOME | End: 2021-09-07
Attending: ORTHOPAEDIC SURGERY
Payer: MEDICARE

## 2021-09-07 DIAGNOSIS — M75.111 PARTIAL NONTRAUMATIC TEAR OF ROTATOR CUFF, RIGHT: ICD-10-CM

## 2021-09-20 ENCOUNTER — HOSPITAL ENCOUNTER (OUTPATIENT)
Dept: MRI IMAGING | Age: 71
Discharge: HOME | End: 2021-09-20
Attending: ORTHOPAEDIC SURGERY
Payer: MEDICARE

## 2021-09-20 DIAGNOSIS — M75.111 PARTIAL NONTRAUMATIC TEAR OF ROTATOR CUFF, RIGHT: ICD-10-CM

## 2021-09-20 PROCEDURE — 73221 MRI JOINT UPR EXTREM W/O DYE: CPT | Performed by: ORTHOPAEDIC SURGERY

## 2021-09-21 NOTE — PROGRESS NOTES
CONCLUSION:     1. There are postoperative [facility] with suggestion of resection of the distal clavicle and subacromial decompression.  Please correlate with patient's surgical history.    2. There are mild to moderate changes of tendinosis t

## 2021-09-22 PROBLEM — M75.21 RIGHT BICIPITAL TENOSYNOVITIS: Status: ACTIVE | Noted: 2021-09-22

## 2021-09-24 PROBLEM — M25.511 CHRONIC RIGHT SHOULDER PAIN: Status: ACTIVE | Noted: 2021-09-24

## 2021-09-24 PROBLEM — G89.29 CHRONIC RIGHT SHOULDER PAIN: Status: ACTIVE | Noted: 2021-09-24

## 2022-04-14 ENCOUNTER — TELEPHONE (OUTPATIENT)
Dept: HEMATOLOGY/ONCOLOGY | Facility: HOSPITAL | Age: 72
End: 2022-04-14

## 2022-04-14 PROBLEM — L03.90 RECURRENT CELLULITIS: Status: ACTIVE | Noted: 2022-04-14

## 2022-04-14 PROBLEM — I83.009 VENOUS STASIS ULCERS (HCC): Status: ACTIVE | Noted: 2022-04-14

## 2022-04-14 PROBLEM — L97.909 VENOUS STASIS ULCERS (HCC): Status: ACTIVE | Noted: 2022-04-14

## 2022-04-14 NOTE — TELEPHONE ENCOUNTER
Left voicemail for patient regarding scheduling him for his antibiotic. Waiting for call back from infectious disease office as well on when patient should get started on antibiotic infusions.

## 2022-04-14 NOTE — TELEPHONE ENCOUNTER
Lee De Los Santos RN from [facility] [employer] office called. Reported that patient will have PICC line placed Monday in [address]. The patient is ok to start antibiotics on Tuesday 4/19. Patient scheduled by [facility] and will be called to confirm by PSR.

## 2022-04-15 ENCOUNTER — APPOINTMENT (OUTPATIENT)
Dept: HEMATOLOGY/ONCOLOGY | Facility: HOSPITAL | Age: 72
End: 2022-04-15
Attending: INTERNAL MEDICINE
Payer: MEDICARE

## 2022-04-19 ENCOUNTER — NURSE ONLY (OUTPATIENT)
Dept: HEMATOLOGY/ONCOLOGY | Facility: HOSPITAL | Age: 72
End: 2022-04-19
Attending: INTERNAL MEDICINE
Payer: MEDICARE

## 2022-04-19 VITALS
BODY MASS INDEX: 47 KG/M2 | DIASTOLIC BLOOD PRESSURE: 67 MMHG | HEART RATE: 66 BPM | WEIGHT: 280.81 LBS | SYSTOLIC BLOOD PRESSURE: 109 MMHG | RESPIRATION RATE: 22 BRPM | TEMPERATURE: 98 F

## 2022-04-19 DIAGNOSIS — L03.90 RECURRENT CELLULITIS: ICD-10-CM

## 2022-04-19 DIAGNOSIS — I83.009 VENOUS STASIS ULCERS (HCC): Primary | ICD-10-CM

## 2022-04-19 DIAGNOSIS — L97.909 VENOUS STASIS ULCERS (HCC): Primary | ICD-10-CM

## 2022-04-19 LAB
ALBUMIN SERPL-MCNC: 3.5 G/DL (ref 3.4–5)
ALBUMIN/GLOB SERPL: 0.9 {RATIO} (ref 1–2)
ALP LIVER SERPL-CCNC: 98 U/L
ALT SERPL-CCNC: 29 U/L
ANION GAP SERPL CALC-SCNC: 6 MMOL/L (ref 0–18)
AST SERPL-CCNC: 18 U/L (ref 15–37)
BASOPHILS # BLD AUTO: 0.04 X10(3) UL (ref 0–0.2)
BASOPHILS NFR BLD AUTO: 0.5 %
BILIRUB SERPL-MCNC: 0.5 MG/DL (ref 0.1–2)
BUN BLD-MCNC: 22 MG/DL (ref 7–18)
CALCIUM BLD-MCNC: 9.5 MG/DL (ref 8.5–10.1)
CHLORIDE SERPL-SCNC: 102 MMOL/L (ref 98–112)
CO2 SERPL-SCNC: 27 MMOL/L (ref 21–32)
CREAT BLD-MCNC: 1.1 MG/DL
CRP SERPL-MCNC: 1.46 MG/DL (ref ?–0.3)
EOSINOPHIL # BLD AUTO: 0.18 X10(3) UL (ref 0–0.7)
EOSINOPHIL NFR BLD AUTO: 2 %
ERYTHROCYTE [DISTWIDTH] IN BLOOD BY AUTOMATED COUNT: 13.5 %
FASTING STATUS PATIENT QL REPORTED: NO
GLOBULIN PLAS-MCNC: 3.8 G/DL (ref 2.8–4.4)
GLUCOSE BLD-MCNC: 126 MG/DL (ref 70–99)
HCT VFR BLD AUTO: 45.6 %
HGB BLD-MCNC: 14.7 G/DL
IMM GRANULOCYTES # BLD AUTO: 0.06 X10(3) UL (ref 0–1)
IMM GRANULOCYTES NFR BLD: 0.7 %
LYMPHOCYTES # BLD AUTO: 1.3 X10(3) UL (ref 1–4)
LYMPHOCYTES NFR BLD AUTO: 14.8 %
MCH RBC QN AUTO: 29.5 PG (ref 26–34)
MCHC RBC AUTO-ENTMCNC: 32.2 G/DL (ref 31–37)
MCV RBC AUTO: 91.4 FL
MONOCYTES # BLD AUTO: 0.79 X10(3) UL (ref 0.1–1)
MONOCYTES NFR BLD AUTO: 9 %
NEUTROPHILS # BLD AUTO: 6.42 X10 (3) UL (ref 1.5–7.7)
NEUTROPHILS # BLD AUTO: 6.42 X10(3) UL (ref 1.5–7.7)
NEUTROPHILS NFR BLD AUTO: 73 %
OSMOLALITY SERPL CALC.SUM OF ELEC: 285 MOSM/KG (ref 275–295)
PLATELET # BLD AUTO: 251 10(3)UL (ref 150–450)
POTASSIUM SERPL-SCNC: 4.5 MMOL/L (ref 3.5–5.1)
PROT SERPL-MCNC: 7.3 G/DL (ref 6.4–8.2)
RBC # BLD AUTO: 4.99 X10(6)UL
SODIUM SERPL-SCNC: 135 MMOL/L (ref 136–145)
WBC # BLD AUTO: 8.8 X10(3) UL (ref 4–11)

## 2022-04-19 PROCEDURE — 96365 THER/PROPH/DIAG IV INF INIT: CPT

## 2022-04-19 PROCEDURE — 85025 COMPLETE CBC W/AUTO DIFF WBC: CPT

## 2022-04-19 PROCEDURE — 86140 C-REACTIVE PROTEIN: CPT

## 2022-04-19 PROCEDURE — 80053 COMPREHEN METABOLIC PANEL: CPT

## 2022-04-19 NOTE — PROGRESS NOTES
Education Record    Learner:  Patient    Disease / Diagnosis: recurrent cellulitis:   Rocephin   Barriers / Limitations:  None   Comments:    Method:  Brief focused and Discussion   Comments:    General Topics:  Side effects and symptom management   Comments:    Outcome:  Shows understanding   Comments:

## 2022-04-20 ENCOUNTER — NURSE ONLY (OUTPATIENT)
Dept: HEMATOLOGY/ONCOLOGY | Facility: HOSPITAL | Age: 72
End: 2022-04-20
Attending: INTERNAL MEDICINE
Payer: MEDICARE

## 2022-04-20 VITALS
HEART RATE: 73 BPM | OXYGEN SATURATION: 95 % | RESPIRATION RATE: 22 BRPM | SYSTOLIC BLOOD PRESSURE: 118 MMHG | DIASTOLIC BLOOD PRESSURE: 68 MMHG | TEMPERATURE: 98 F

## 2022-04-20 DIAGNOSIS — I83.009 VENOUS STASIS ULCERS (HCC): ICD-10-CM

## 2022-04-20 DIAGNOSIS — L97.909 VENOUS STASIS ULCERS (HCC): ICD-10-CM

## 2022-04-20 DIAGNOSIS — L03.90 RECURRENT CELLULITIS: Primary | ICD-10-CM

## 2022-04-20 PROCEDURE — 96365 THER/PROPH/DIAG IV INF INIT: CPT

## 2022-04-20 NOTE — PROGRESS NOTES
Education Record    Learner:  Patient    Disease / Diagnosis: Cellulitis - Rocephin    Barriers / Limitations:  None   Comments:    Method:  Brief focused and Reinforcement   Comments:    General Topics:  Infection, Medication, Side effects and symptom management and Plan of care reviewed   Comments:    Outcome:  Shows understanding   Comments: Patient tolerated infusion and discharged in stable condition.

## 2022-04-21 ENCOUNTER — NURSE ONLY (OUTPATIENT)
Dept: HEMATOLOGY/ONCOLOGY | Facility: HOSPITAL | Age: 72
End: 2022-04-21
Attending: INTERNAL MEDICINE
Payer: MEDICARE

## 2022-04-21 VITALS
HEART RATE: 85 BPM | DIASTOLIC BLOOD PRESSURE: 63 MMHG | RESPIRATION RATE: 20 BRPM | TEMPERATURE: 98 F | SYSTOLIC BLOOD PRESSURE: 128 MMHG | OXYGEN SATURATION: 96 %

## 2022-04-21 DIAGNOSIS — L97.909 VENOUS STASIS ULCERS (HCC): ICD-10-CM

## 2022-04-21 DIAGNOSIS — I83.009 VENOUS STASIS ULCERS (HCC): ICD-10-CM

## 2022-04-21 DIAGNOSIS — L03.90 RECURRENT CELLULITIS: Primary | ICD-10-CM

## 2022-04-21 PROCEDURE — 96365 THER/PROPH/DIAG IV INF INIT: CPT

## 2022-04-21 NOTE — PROGRESS NOTES
Education Record    Learner:  Patient    Disease / Diagnosis: Cellulitis - Rocephin    Barriers / Limitations:  None   Comments:    Method:  Brief focused and Reinforcement   Comments:    General Topics:  Medication, Side effects and symptom management and Plan of care reviewed   Comments:    Outcome:  Shows understanding   Comments: Patient tolerated infusion and discharged in stable condition.

## 2022-04-22 ENCOUNTER — NURSE ONLY (OUTPATIENT)
Dept: HEMATOLOGY/ONCOLOGY | Facility: HOSPITAL | Age: 72
End: 2022-04-22
Attending: INTERNAL MEDICINE
Payer: MEDICARE

## 2022-04-22 VITALS
SYSTOLIC BLOOD PRESSURE: 127 MMHG | HEART RATE: 90 BPM | RESPIRATION RATE: 20 BRPM | OXYGEN SATURATION: 95 % | TEMPERATURE: 98 F | DIASTOLIC BLOOD PRESSURE: 75 MMHG

## 2022-04-22 DIAGNOSIS — L03.90 RECURRENT CELLULITIS: Primary | ICD-10-CM

## 2022-04-22 DIAGNOSIS — L97.909 VENOUS STASIS ULCERS (HCC): ICD-10-CM

## 2022-04-22 DIAGNOSIS — I83.009 VENOUS STASIS ULCERS (HCC): ICD-10-CM

## 2022-04-22 PROCEDURE — 96365 THER/PROPH/DIAG IV INF INIT: CPT

## 2022-04-23 ENCOUNTER — NURSE ONLY (OUTPATIENT)
Dept: HEMATOLOGY/ONCOLOGY | Facility: HOSPITAL | Age: 72
End: 2022-04-23
Attending: INTERNAL MEDICINE
Payer: MEDICARE

## 2022-04-23 VITALS
HEART RATE: 83 BPM | TEMPERATURE: 97 F | OXYGEN SATURATION: 95 % | DIASTOLIC BLOOD PRESSURE: 72 MMHG | RESPIRATION RATE: 20 BRPM | SYSTOLIC BLOOD PRESSURE: 125 MMHG

## 2022-04-23 DIAGNOSIS — L97.909 VENOUS STASIS ULCERS (HCC): ICD-10-CM

## 2022-04-23 DIAGNOSIS — L03.90 RECURRENT CELLULITIS: Primary | ICD-10-CM

## 2022-04-23 DIAGNOSIS — I83.009 VENOUS STASIS ULCERS (HCC): ICD-10-CM

## 2022-04-23 PROCEDURE — 96365 THER/PROPH/DIAG IV INF INIT: CPT

## 2022-04-23 NOTE — PROGRESS NOTES
Pt here for rocephin.   Arrives Ambulating independently, accompanied by Self           Pregnancy screening: Not applicable    Modifications in dose or schedule: No     Frequency of blood return and site check throughout administration: Prior to administration   Discharged to Home, Ambulating independently, accompanied by:Self    Outpatient Oncology Care Plan  Problem list:  fatigue  knowledge deficit  Problems related to:    disease/disease progression  Interventions:  provided general teaching  Expected outcomes:  safe in environment  symptoms relieved/minimized  understands plan of care  Progress towards outcome:  making progress    Education Record    Learner:  Patient  Barriers / Limitations:  None  Method:  Reinforcement  Outcome:  Shows understanding  Comments:

## 2022-04-24 ENCOUNTER — NURSE ONLY (OUTPATIENT)
Dept: HEMATOLOGY/ONCOLOGY | Facility: HOSPITAL | Age: 72
End: 2022-04-24
Attending: INTERNAL MEDICINE
Payer: MEDICARE

## 2022-04-24 VITALS
DIASTOLIC BLOOD PRESSURE: 76 MMHG | SYSTOLIC BLOOD PRESSURE: 118 MMHG | RESPIRATION RATE: 20 BRPM | TEMPERATURE: 97 F | OXYGEN SATURATION: 93 % | HEART RATE: 76 BPM

## 2022-04-24 DIAGNOSIS — L03.90 RECURRENT CELLULITIS: Primary | ICD-10-CM

## 2022-04-24 DIAGNOSIS — L97.909 VENOUS STASIS ULCERS (HCC): ICD-10-CM

## 2022-04-24 DIAGNOSIS — I83.009 VENOUS STASIS ULCERS (HCC): ICD-10-CM

## 2022-04-24 PROCEDURE — 96365 THER/PROPH/DIAG IV INF INIT: CPT

## 2022-04-24 NOTE — PROGRESS NOTES
Education Record    Learner:  Patient    Disease / Diagnosis: recurrent celullitis    Barriers / Limitations:  None   Comments:    Method:  Brief focused   Comments:    General Topics:  Plan of care reviewed and Fall risk and prevention   Comments:    Outcome:  Shows understanding   Comments:

## 2022-04-25 ENCOUNTER — NURSE ONLY (OUTPATIENT)
Dept: HEMATOLOGY/ONCOLOGY | Facility: HOSPITAL | Age: 72
End: 2022-04-25
Attending: INTERNAL MEDICINE
Payer: MEDICARE

## 2022-04-25 DIAGNOSIS — I83.009 VENOUS STASIS ULCERS (HCC): ICD-10-CM

## 2022-04-25 DIAGNOSIS — L97.909 VENOUS STASIS ULCERS (HCC): ICD-10-CM

## 2022-04-25 DIAGNOSIS — L03.90 RECURRENT CELLULITIS: Primary | ICD-10-CM

## 2022-04-25 LAB
ALBUMIN SERPL-MCNC: 3.6 G/DL (ref 3.4–5)
ALBUMIN/GLOB SERPL: 1 {RATIO} (ref 1–2)
ALP LIVER SERPL-CCNC: 90 U/L
ALT SERPL-CCNC: 36 U/L
ANION GAP SERPL CALC-SCNC: 5 MMOL/L (ref 0–18)
AST SERPL-CCNC: 22 U/L (ref 15–37)
BASOPHILS # BLD AUTO: 0.04 X10(3) UL (ref 0–0.2)
BASOPHILS NFR BLD AUTO: 0.5 %
BILIRUB SERPL-MCNC: 0.3 MG/DL (ref 0.1–2)
BUN BLD-MCNC: 19 MG/DL (ref 7–18)
CALCIUM BLD-MCNC: 9.1 MG/DL (ref 8.5–10.1)
CHLORIDE SERPL-SCNC: 105 MMOL/L (ref 98–112)
CO2 SERPL-SCNC: 27 MMOL/L (ref 21–32)
CREAT BLD-MCNC: 1.28 MG/DL
CRP SERPL-MCNC: 1.82 MG/DL (ref ?–0.3)
EOSINOPHIL # BLD AUTO: 0.2 X10(3) UL (ref 0–0.7)
EOSINOPHIL NFR BLD AUTO: 2.3 %
ERYTHROCYTE [DISTWIDTH] IN BLOOD BY AUTOMATED COUNT: 13.8 %
FASTING STATUS PATIENT QL REPORTED: NO
GLOBULIN PLAS-MCNC: 3.5 G/DL (ref 2.8–4.4)
GLUCOSE BLD-MCNC: 145 MG/DL (ref 70–99)
HCT VFR BLD AUTO: 43.9 %
HGB BLD-MCNC: 13.7 G/DL
IMM GRANULOCYTES # BLD AUTO: 0.04 X10(3) UL (ref 0–1)
IMM GRANULOCYTES NFR BLD: 0.5 %
LYMPHOCYTES # BLD AUTO: 1.15 X10(3) UL (ref 1–4)
LYMPHOCYTES NFR BLD AUTO: 13.1 %
MCH RBC QN AUTO: 28.7 PG (ref 26–34)
MCHC RBC AUTO-ENTMCNC: 31.2 G/DL (ref 31–37)
MCV RBC AUTO: 91.8 FL
MONOCYTES # BLD AUTO: 0.7 X10(3) UL (ref 0.1–1)
MONOCYTES NFR BLD AUTO: 8 %
NEUTROPHILS # BLD AUTO: 6.66 X10 (3) UL (ref 1.5–7.7)
NEUTROPHILS # BLD AUTO: 6.66 X10(3) UL (ref 1.5–7.7)
NEUTROPHILS NFR BLD AUTO: 75.6 %
OSMOLALITY SERPL CALC.SUM OF ELEC: 289 MOSM/KG (ref 275–295)
PLATELET # BLD AUTO: 232 10(3)UL (ref 150–450)
POTASSIUM SERPL-SCNC: 4 MMOL/L (ref 3.5–5.1)
PROT SERPL-MCNC: 7.1 G/DL (ref 6.4–8.2)
RBC # BLD AUTO: 4.78 X10(6)UL
SODIUM SERPL-SCNC: 137 MMOL/L (ref 136–145)
WBC # BLD AUTO: 8.8 X10(3) UL (ref 4–11)

## 2022-04-25 PROCEDURE — 86140 C-REACTIVE PROTEIN: CPT

## 2022-04-25 PROCEDURE — 85025 COMPLETE CBC W/AUTO DIFF WBC: CPT

## 2022-04-25 PROCEDURE — 96365 THER/PROPH/DIAG IV INF INIT: CPT

## 2022-04-25 PROCEDURE — 80053 COMPREHEN METABOLIC PANEL: CPT

## 2022-04-25 NOTE — PROGRESS NOTES
Education Record    Learner:  Patient    Disease / Diagnosis: recurrent cellulitis: rocephin    Barriers / Limitations:  None   Comments:    Method:  Brief focused and Discussion   Comments:    General Topics:  Precautions and Side effects and symptom management   Comments:    Outcome:  Shows understanding   Comments:

## 2022-04-26 ENCOUNTER — NURSE ONLY (OUTPATIENT)
Dept: HEMATOLOGY/ONCOLOGY | Facility: HOSPITAL | Age: 72
End: 2022-04-26
Attending: INTERNAL MEDICINE
Payer: MEDICARE

## 2022-04-26 VITALS
TEMPERATURE: 98 F | DIASTOLIC BLOOD PRESSURE: 79 MMHG | RESPIRATION RATE: 20 BRPM | OXYGEN SATURATION: 94 % | HEART RATE: 100 BPM | SYSTOLIC BLOOD PRESSURE: 167 MMHG

## 2022-04-26 DIAGNOSIS — I83.009 VENOUS STASIS ULCERS (HCC): ICD-10-CM

## 2022-04-26 DIAGNOSIS — L03.90 RECURRENT CELLULITIS: Primary | ICD-10-CM

## 2022-04-26 DIAGNOSIS — L97.909 VENOUS STASIS ULCERS (HCC): ICD-10-CM

## 2022-04-26 PROCEDURE — 96365 THER/PROPH/DIAG IV INF INIT: CPT

## 2022-04-26 NOTE — PROGRESS NOTES
Education Record    Learner:  Patient    Disease / Diagnosis: Rocephin.     Barriers / Limitations:  None   Comments:    Method:  Discussion   Comments:    General Topics:  Plan of care reviewed   Comments:    Outcome:  Shows understanding   Comments:

## 2022-04-27 ENCOUNTER — NURSE ONLY (OUTPATIENT)
Dept: HEMATOLOGY/ONCOLOGY | Facility: HOSPITAL | Age: 72
End: 2022-04-27
Attending: INTERNAL MEDICINE
Payer: MEDICARE

## 2022-04-27 VITALS
SYSTOLIC BLOOD PRESSURE: 104 MMHG | TEMPERATURE: 98 F | RESPIRATION RATE: 18 BRPM | DIASTOLIC BLOOD PRESSURE: 68 MMHG | HEART RATE: 88 BPM | OXYGEN SATURATION: 95 %

## 2022-04-27 DIAGNOSIS — L97.909 VENOUS STASIS ULCERS (HCC): ICD-10-CM

## 2022-04-27 DIAGNOSIS — I83.009 VENOUS STASIS ULCERS (HCC): ICD-10-CM

## 2022-04-27 DIAGNOSIS — L03.90 RECURRENT CELLULITIS: Primary | ICD-10-CM

## 2022-04-27 PROCEDURE — 96365 THER/PROPH/DIAG IV INF INIT: CPT

## 2022-04-27 NOTE — PROGRESS NOTES
Education Record    Learner:  Patient    Disease / Diagnosis: Rocephin    Barriers / Limitations:  None   Comments:    Method:  Discussion   Comments:    General Topics:  Plan of care reviewed   Comments:    Outcome:  Shows understanding   Comments:    Rocephin infused per MD orders without incident. Tolerated well. Reviewed next appointment. Declined printed AVS.  Discharged home in stable condition, no new complaints.

## 2022-04-28 ENCOUNTER — NURSE ONLY (OUTPATIENT)
Dept: HEMATOLOGY/ONCOLOGY | Facility: HOSPITAL | Age: 72
End: 2022-04-28
Attending: INTERNAL MEDICINE
Payer: MEDICARE

## 2022-04-28 VITALS
OXYGEN SATURATION: 96 % | RESPIRATION RATE: 16 BRPM | HEART RATE: 91 BPM | SYSTOLIC BLOOD PRESSURE: 124 MMHG | DIASTOLIC BLOOD PRESSURE: 70 MMHG | TEMPERATURE: 97 F

## 2022-04-28 DIAGNOSIS — L97.909 VENOUS STASIS ULCERS (HCC): ICD-10-CM

## 2022-04-28 DIAGNOSIS — I83.009 VENOUS STASIS ULCERS (HCC): ICD-10-CM

## 2022-04-28 DIAGNOSIS — L03.90 RECURRENT CELLULITIS: Primary | ICD-10-CM

## 2022-04-28 PROCEDURE — 96365 THER/PROPH/DIAG IV INF INIT: CPT

## 2022-04-29 ENCOUNTER — NURSE ONLY (OUTPATIENT)
Dept: HEMATOLOGY/ONCOLOGY | Facility: HOSPITAL | Age: 72
End: 2022-04-29
Attending: INTERNAL MEDICINE
Payer: MEDICARE

## 2022-04-29 VITALS
DIASTOLIC BLOOD PRESSURE: 79 MMHG | RESPIRATION RATE: 16 BRPM | OXYGEN SATURATION: 95 % | HEART RATE: 86 BPM | TEMPERATURE: 98 F | SYSTOLIC BLOOD PRESSURE: 150 MMHG

## 2022-04-29 DIAGNOSIS — L97.909 VENOUS STASIS ULCERS (HCC): ICD-10-CM

## 2022-04-29 DIAGNOSIS — L03.90 RECURRENT CELLULITIS: Primary | ICD-10-CM

## 2022-04-29 DIAGNOSIS — I83.009 VENOUS STASIS ULCERS (HCC): ICD-10-CM

## 2022-04-29 PROCEDURE — 96365 THER/PROPH/DIAG IV INF INIT: CPT

## 2022-04-29 NOTE — PROGRESS NOTES
Education Record    Learner:  Patient    Disease / Diagnosis: rocephin 2g infusion.      Barriers / Limitations:  None   Comments:    Method:  Brief focused   Comments:    General Topics:  Plan of care reviewed   Comments:    Outcome:  Shows understanding   Comments:

## 2022-04-30 ENCOUNTER — NURSE ONLY (OUTPATIENT)
Dept: HEMATOLOGY/ONCOLOGY | Facility: HOSPITAL | Age: 72
End: 2022-04-30
Attending: INTERNAL MEDICINE
Payer: MEDICARE

## 2022-04-30 VITALS
SYSTOLIC BLOOD PRESSURE: 137 MMHG | RESPIRATION RATE: 16 BRPM | OXYGEN SATURATION: 95 % | TEMPERATURE: 99 F | DIASTOLIC BLOOD PRESSURE: 83 MMHG | HEART RATE: 96 BPM

## 2022-04-30 DIAGNOSIS — L03.90 RECURRENT CELLULITIS: Primary | ICD-10-CM

## 2022-04-30 PROCEDURE — 96365 THER/PROPH/DIAG IV INF INIT: CPT

## 2022-04-30 NOTE — PROGRESS NOTES
Education Record    Learner:  Patient    Disease / Diagnosis: recurrent cellulitis: rocephin     Barriers / Limitations:  None   Comments:    Method:  Brief focused and Discussion   Comments:    General Topics:  Precautions, Side effects and symptom management and Plan of care reviewed   Comments:    Outcome:  Shows understanding   Comments:

## 2022-05-01 ENCOUNTER — NURSE ONLY (OUTPATIENT)
Dept: HEMATOLOGY/ONCOLOGY | Facility: HOSPITAL | Age: 72
End: 2022-05-01
Attending: INTERNAL MEDICINE
Payer: MEDICARE

## 2022-05-01 VITALS
DIASTOLIC BLOOD PRESSURE: 77 MMHG | SYSTOLIC BLOOD PRESSURE: 141 MMHG | HEART RATE: 93 BPM | OXYGEN SATURATION: 95 % | RESPIRATION RATE: 16 BRPM | TEMPERATURE: 98 F

## 2022-05-01 DIAGNOSIS — L03.90 RECURRENT CELLULITIS: Primary | ICD-10-CM

## 2022-05-01 PROCEDURE — 96365 THER/PROPH/DIAG IV INF INIT: CPT

## 2022-05-01 NOTE — PROGRESS NOTES
Education Record    Learner:  Patient    Disease / Diagnosis: recurrent cellulitis: rocephin    Barriers / Limitations:  None   Comments:    Method:  Discussion   Comments:    General Topics:  Plan of care reviewed   Comments:    Outcome:  Shows understanding   Comments:    Rocephin infused per MD order without incident. Tolerated well. Reviewed next appointment. Discharged home in stable condition, no new complaints.

## 2022-05-02 ENCOUNTER — NURSE ONLY (OUTPATIENT)
Dept: HEMATOLOGY/ONCOLOGY | Facility: HOSPITAL | Age: 72
End: 2022-05-02
Attending: INTERNAL MEDICINE
Payer: MEDICARE

## 2022-05-02 VITALS
HEART RATE: 78 BPM | RESPIRATION RATE: 16 BRPM | DIASTOLIC BLOOD PRESSURE: 88 MMHG | SYSTOLIC BLOOD PRESSURE: 155 MMHG | OXYGEN SATURATION: 94 % | TEMPERATURE: 98 F

## 2022-05-02 DIAGNOSIS — I83.009 VENOUS STASIS ULCERS (HCC): ICD-10-CM

## 2022-05-02 DIAGNOSIS — L97.909 VENOUS STASIS ULCERS (HCC): ICD-10-CM

## 2022-05-02 DIAGNOSIS — L03.90 RECURRENT CELLULITIS: Primary | ICD-10-CM

## 2022-05-02 LAB
ALBUMIN SERPL-MCNC: 3.7 G/DL (ref 3.4–5)
ALBUMIN/GLOB SERPL: 1.1 {RATIO} (ref 1–2)
ALP LIVER SERPL-CCNC: 90 U/L
ALT SERPL-CCNC: 34 U/L
ANION GAP SERPL CALC-SCNC: 4 MMOL/L (ref 0–18)
AST SERPL-CCNC: 22 U/L (ref 15–37)
BASOPHILS # BLD AUTO: 0.04 X10(3) UL (ref 0–0.2)
BASOPHILS NFR BLD AUTO: 0.5 %
BILIRUB SERPL-MCNC: 0.4 MG/DL (ref 0.1–2)
BUN BLD-MCNC: 17 MG/DL (ref 7–18)
CALCIUM BLD-MCNC: 9.3 MG/DL (ref 8.5–10.1)
CHLORIDE SERPL-SCNC: 104 MMOL/L (ref 98–112)
CO2 SERPL-SCNC: 28 MMOL/L (ref 21–32)
CREAT BLD-MCNC: 1.04 MG/DL
CRP SERPL-MCNC: 1.36 MG/DL (ref ?–0.3)
EOSINOPHIL # BLD AUTO: 0.16 X10(3) UL (ref 0–0.7)
EOSINOPHIL NFR BLD AUTO: 1.8 %
ERYTHROCYTE [DISTWIDTH] IN BLOOD BY AUTOMATED COUNT: 14.2 %
FASTING STATUS PATIENT QL REPORTED: NO
GLOBULIN PLAS-MCNC: 3.5 G/DL (ref 2.8–4.4)
GLUCOSE BLD-MCNC: 101 MG/DL (ref 70–99)
HCT VFR BLD AUTO: 45.1 %
HGB BLD-MCNC: 14.4 G/DL
IMM GRANULOCYTES # BLD AUTO: 0.06 X10(3) UL (ref 0–1)
IMM GRANULOCYTES NFR BLD: 0.7 %
LYMPHOCYTES # BLD AUTO: 1.33 X10(3) UL (ref 1–4)
LYMPHOCYTES NFR BLD AUTO: 15.1 %
MCH RBC QN AUTO: 29.3 PG (ref 26–34)
MCHC RBC AUTO-ENTMCNC: 31.9 G/DL (ref 31–37)
MCV RBC AUTO: 91.9 FL
MONOCYTES # BLD AUTO: 0.7 X10(3) UL (ref 0.1–1)
MONOCYTES NFR BLD AUTO: 7.9 %
NEUTROPHILS # BLD AUTO: 6.54 X10 (3) UL (ref 1.5–7.7)
NEUTROPHILS # BLD AUTO: 6.54 X10(3) UL (ref 1.5–7.7)
NEUTROPHILS NFR BLD AUTO: 74 %
OSMOLALITY SERPL CALC.SUM OF ELEC: 284 MOSM/KG (ref 275–295)
PLATELET # BLD AUTO: 235 10(3)UL (ref 150–450)
POTASSIUM SERPL-SCNC: 4.4 MMOL/L (ref 3.5–5.1)
PROT SERPL-MCNC: 7.2 G/DL (ref 6.4–8.2)
RBC # BLD AUTO: 4.91 X10(6)UL
SODIUM SERPL-SCNC: 136 MMOL/L (ref 136–145)
WBC # BLD AUTO: 8.8 X10(3) UL (ref 4–11)

## 2022-05-02 PROCEDURE — 86140 C-REACTIVE PROTEIN: CPT

## 2022-05-02 PROCEDURE — 80053 COMPREHEN METABOLIC PANEL: CPT

## 2022-05-02 PROCEDURE — 96365 THER/PROPH/DIAG IV INF INIT: CPT

## 2022-05-02 PROCEDURE — 85025 COMPLETE CBC W/AUTO DIFF WBC: CPT

## 2022-05-02 NOTE — PROGRESS NOTES
Education Record    Learner:  Patient    Disease / Diagnosis: recurrent cellulitis: rocephin    Barriers / Limitations:  None   Comments:    Method:  Discussion   Comments:    General Topics:  Plan of care reviewed   Comments:    Outcome:  Shows understanding   Comments:    Labs drawn via PICC. PICC dressing changed per protocol. Rocephin given per MD order without incident. Reviewed next appointment. Declined printed AVS. Discharged home in stable condition, no new complaints.

## 2022-05-03 ENCOUNTER — NURSE ONLY (OUTPATIENT)
Dept: HEMATOLOGY/ONCOLOGY | Facility: HOSPITAL | Age: 72
End: 2022-05-03
Attending: INTERNAL MEDICINE
Payer: MEDICARE

## 2022-05-03 VITALS
OXYGEN SATURATION: 96 % | BODY MASS INDEX: 47 KG/M2 | HEART RATE: 96 BPM | SYSTOLIC BLOOD PRESSURE: 136 MMHG | WEIGHT: 284.19 LBS | TEMPERATURE: 98 F | DIASTOLIC BLOOD PRESSURE: 75 MMHG | RESPIRATION RATE: 18 BRPM

## 2022-05-03 DIAGNOSIS — L03.90 RECURRENT CELLULITIS: Primary | ICD-10-CM

## 2022-05-03 PROCEDURE — 96365 THER/PROPH/DIAG IV INF INIT: CPT

## 2022-05-03 NOTE — PROGRESS NOTES
Education Record    Learner:  Patient    Disease / Diagnosis: cellulitis    Barriers / Limitations:  None    Method:  Brief focused, printed material and  reinforcement    General Topics:  Plan of care reviewed    Outcome:  Shows understanding      Receiving rocephin, no new complaints

## 2022-05-04 ENCOUNTER — NURSE ONLY (OUTPATIENT)
Dept: HEMATOLOGY/ONCOLOGY | Facility: HOSPITAL | Age: 72
End: 2022-05-04
Attending: INTERNAL MEDICINE
Payer: MEDICARE

## 2022-05-04 VITALS
HEART RATE: 93 BPM | DIASTOLIC BLOOD PRESSURE: 77 MMHG | TEMPERATURE: 98 F | SYSTOLIC BLOOD PRESSURE: 131 MMHG | OXYGEN SATURATION: 95 % | RESPIRATION RATE: 18 BRPM

## 2022-05-04 DIAGNOSIS — L03.90 RECURRENT CELLULITIS: Primary | ICD-10-CM

## 2022-05-04 DIAGNOSIS — L97.909 VENOUS STASIS ULCERS (HCC): ICD-10-CM

## 2022-05-04 DIAGNOSIS — I83.009 VENOUS STASIS ULCERS (HCC): ICD-10-CM

## 2022-05-04 PROCEDURE — 96365 THER/PROPH/DIAG IV INF INIT: CPT

## 2022-05-04 NOTE — PROGRESS NOTES
Education Record    Learner:  Patient    Disease / Diagnosis: Pt here for Rocephin infusion. Barriers / Limitations:  None    Method:  Brief focused, printed material and  reinforcement    General Topics:  Plan of care reviewed    Outcome:  Shows understanding    Pt tolerated infusion. Pt left in stable condition.

## 2022-05-05 ENCOUNTER — NURSE ONLY (OUTPATIENT)
Dept: HEMATOLOGY/ONCOLOGY | Facility: HOSPITAL | Age: 72
End: 2022-05-05
Attending: INTERNAL MEDICINE
Payer: MEDICARE

## 2022-05-05 VITALS
SYSTOLIC BLOOD PRESSURE: 125 MMHG | TEMPERATURE: 98 F | OXYGEN SATURATION: 96 % | RESPIRATION RATE: 18 BRPM | DIASTOLIC BLOOD PRESSURE: 67 MMHG | HEART RATE: 79 BPM

## 2022-05-05 DIAGNOSIS — I83.009 VENOUS STASIS ULCERS (HCC): ICD-10-CM

## 2022-05-05 DIAGNOSIS — L97.909 VENOUS STASIS ULCERS (HCC): ICD-10-CM

## 2022-05-05 DIAGNOSIS — L03.90 RECURRENT CELLULITIS: Primary | ICD-10-CM

## 2022-05-05 PROCEDURE — 96365 THER/PROPH/DIAG IV INF INIT: CPT

## 2022-05-05 NOTE — PROGRESS NOTES
Education Record    Learner:  Patient    Disease / Diagnosis: recrrant cellulitis    Barriers / Limitations:  None   Comments:    Method:  Brief focused   Comments:    General Topics:  Plan of care reviewed   Comments:    Outcome:  Shows understanding   Comments:

## 2022-05-06 ENCOUNTER — NURSE ONLY (OUTPATIENT)
Dept: HEMATOLOGY/ONCOLOGY | Facility: HOSPITAL | Age: 72
End: 2022-05-06
Attending: INTERNAL MEDICINE
Payer: MEDICARE

## 2022-05-06 VITALS
OXYGEN SATURATION: 95 % | SYSTOLIC BLOOD PRESSURE: 158 MMHG | HEART RATE: 87 BPM | RESPIRATION RATE: 18 BRPM | TEMPERATURE: 98 F | DIASTOLIC BLOOD PRESSURE: 79 MMHG

## 2022-05-06 DIAGNOSIS — L97.909 VENOUS STASIS ULCERS (HCC): ICD-10-CM

## 2022-05-06 DIAGNOSIS — I83.009 VENOUS STASIS ULCERS (HCC): ICD-10-CM

## 2022-05-06 DIAGNOSIS — L03.90 RECURRENT CELLULITIS: Primary | ICD-10-CM

## 2022-05-06 PROCEDURE — 96365 THER/PROPH/DIAG IV INF INIT: CPT

## 2022-05-06 NOTE — PROGRESS NOTES
Education Record    Learner:  Patient    Disease / Diagnosis: Recurrent Cellulitis     Barriers / Limitations:  None   Comments:    Method:  Discussion   Comments:    General Topics:  Plan of care reviewed   Comments:    Outcome:  Shows understanding   Comments: Pt tolerated inf well. Will continue to monitor.

## 2022-05-07 ENCOUNTER — NURSE ONLY (OUTPATIENT)
Dept: HEMATOLOGY/ONCOLOGY | Facility: HOSPITAL | Age: 72
End: 2022-05-07
Attending: INTERNAL MEDICINE
Payer: MEDICARE

## 2022-05-07 VITALS
TEMPERATURE: 98 F | DIASTOLIC BLOOD PRESSURE: 80 MMHG | HEART RATE: 107 BPM | OXYGEN SATURATION: 92 % | RESPIRATION RATE: 18 BRPM | SYSTOLIC BLOOD PRESSURE: 189 MMHG

## 2022-05-07 DIAGNOSIS — I83.009 VENOUS STASIS ULCERS (HCC): ICD-10-CM

## 2022-05-07 DIAGNOSIS — L03.90 RECURRENT CELLULITIS: Primary | ICD-10-CM

## 2022-05-07 DIAGNOSIS — L97.909 VENOUS STASIS ULCERS (HCC): ICD-10-CM

## 2022-05-07 PROCEDURE — 96365 THER/PROPH/DIAG IV INF INIT: CPT

## 2022-05-08 ENCOUNTER — NURSE ONLY (OUTPATIENT)
Dept: HEMATOLOGY/ONCOLOGY | Facility: HOSPITAL | Age: 72
End: 2022-05-08
Attending: INTERNAL MEDICINE
Payer: MEDICARE

## 2022-05-08 VITALS
DIASTOLIC BLOOD PRESSURE: 73 MMHG | SYSTOLIC BLOOD PRESSURE: 117 MMHG | RESPIRATION RATE: 18 BRPM | HEART RATE: 74 BPM | OXYGEN SATURATION: 96 % | TEMPERATURE: 98 F

## 2022-05-08 DIAGNOSIS — L97.909 VENOUS STASIS ULCERS (HCC): ICD-10-CM

## 2022-05-08 DIAGNOSIS — L03.90 RECURRENT CELLULITIS: Primary | ICD-10-CM

## 2022-05-08 DIAGNOSIS — I83.009 VENOUS STASIS ULCERS (HCC): ICD-10-CM

## 2022-05-08 PROCEDURE — 96365 THER/PROPH/DIAG IV INF INIT: CPT

## 2022-05-08 NOTE — PROGRESS NOTES
Education Record    Learner:  Patient    Disease / Diagnosis:Recurrent cellulitis     Barriers / Limitations:  None   Comments:    Method:  Brief focused   Comments:    General Topics:  Infection, Medication, Pain, Precautions, Procedure, Side effects and symptom management, Plan of care reviewed and Fall risk and prevention   Comments:    Outcome:  Shows understanding   Comments:

## 2022-05-09 ENCOUNTER — NURSE ONLY (OUTPATIENT)
Dept: HEMATOLOGY/ONCOLOGY | Facility: HOSPITAL | Age: 72
End: 2022-05-09
Attending: INTERNAL MEDICINE
Payer: MEDICARE

## 2022-05-09 VITALS
HEART RATE: 86 BPM | OXYGEN SATURATION: 94 % | DIASTOLIC BLOOD PRESSURE: 73 MMHG | TEMPERATURE: 98 F | SYSTOLIC BLOOD PRESSURE: 130 MMHG | RESPIRATION RATE: 18 BRPM

## 2022-05-09 DIAGNOSIS — L03.90 RECURRENT CELLULITIS: Primary | ICD-10-CM

## 2022-05-09 DIAGNOSIS — L97.909 VENOUS STASIS ULCERS (HCC): ICD-10-CM

## 2022-05-09 DIAGNOSIS — I83.009 VENOUS STASIS ULCERS (HCC): ICD-10-CM

## 2022-05-09 LAB
ALBUMIN SERPL-MCNC: 3.5 G/DL (ref 3.4–5)
ALBUMIN/GLOB SERPL: 1 {RATIO} (ref 1–2)
ALP LIVER SERPL-CCNC: 91 U/L
ALT SERPL-CCNC: 28 U/L
ANION GAP SERPL CALC-SCNC: 7 MMOL/L (ref 0–18)
AST SERPL-CCNC: 20 U/L (ref 15–37)
BASOPHILS # BLD AUTO: 0.02 X10(3) UL (ref 0–0.2)
BASOPHILS NFR BLD AUTO: 0.3 %
BILIRUB SERPL-MCNC: 0.4 MG/DL (ref 0.1–2)
BUN BLD-MCNC: 20 MG/DL (ref 7–18)
CALCIUM BLD-MCNC: 8.9 MG/DL (ref 8.5–10.1)
CHLORIDE SERPL-SCNC: 105 MMOL/L (ref 98–112)
CO2 SERPL-SCNC: 27 MMOL/L (ref 21–32)
CREAT BLD-MCNC: 1.14 MG/DL
CRP SERPL-MCNC: 1.78 MG/DL (ref ?–0.3)
EOSINOPHIL # BLD AUTO: 0.2 X10(3) UL (ref 0–0.7)
EOSINOPHIL NFR BLD AUTO: 2.7 %
ERYTHROCYTE [DISTWIDTH] IN BLOOD BY AUTOMATED COUNT: 14.1 %
FASTING STATUS PATIENT QL REPORTED: NO
GLOBULIN PLAS-MCNC: 3.4 G/DL (ref 2.8–4.4)
GLUCOSE BLD-MCNC: 186 MG/DL (ref 70–99)
HCT VFR BLD AUTO: 44.9 %
HGB BLD-MCNC: 14.1 G/DL
IMM GRANULOCYTES # BLD AUTO: 0.06 X10(3) UL (ref 0–1)
IMM GRANULOCYTES NFR BLD: 0.8 %
LYMPHOCYTES # BLD AUTO: 0.94 X10(3) UL (ref 1–4)
LYMPHOCYTES NFR BLD AUTO: 12.6 %
MCH RBC QN AUTO: 29.5 PG (ref 26–34)
MCHC RBC AUTO-ENTMCNC: 31.4 G/DL (ref 31–37)
MCV RBC AUTO: 93.9 FL
MONOCYTES # BLD AUTO: 0.6 X10(3) UL (ref 0.1–1)
MONOCYTES NFR BLD AUTO: 8 %
NEUTROPHILS # BLD AUTO: 5.65 X10 (3) UL (ref 1.5–7.7)
NEUTROPHILS # BLD AUTO: 5.65 X10(3) UL (ref 1.5–7.7)
NEUTROPHILS NFR BLD AUTO: 75.6 %
OSMOLALITY SERPL CALC.SUM OF ELEC: 295 MOSM/KG (ref 275–295)
PLATELET # BLD AUTO: 231 10(3)UL (ref 150–450)
POTASSIUM SERPL-SCNC: 4.1 MMOL/L (ref 3.5–5.1)
PROT SERPL-MCNC: 6.9 G/DL (ref 6.4–8.2)
RBC # BLD AUTO: 4.78 X10(6)UL
SODIUM SERPL-SCNC: 139 MMOL/L (ref 136–145)
WBC # BLD AUTO: 7.5 X10(3) UL (ref 4–11)

## 2022-05-09 PROCEDURE — 80053 COMPREHEN METABOLIC PANEL: CPT

## 2022-05-09 PROCEDURE — 96365 THER/PROPH/DIAG IV INF INIT: CPT

## 2022-05-09 PROCEDURE — 85025 COMPLETE CBC W/AUTO DIFF WBC: CPT

## 2022-05-09 PROCEDURE — 86140 C-REACTIVE PROTEIN: CPT

## 2022-05-10 ENCOUNTER — NURSE ONLY (OUTPATIENT)
Dept: HEMATOLOGY/ONCOLOGY | Facility: HOSPITAL | Age: 72
End: 2022-05-10
Attending: INTERNAL MEDICINE
Payer: MEDICARE

## 2022-05-10 VITALS
HEART RATE: 87 BPM | OXYGEN SATURATION: 95 % | RESPIRATION RATE: 18 BRPM | DIASTOLIC BLOOD PRESSURE: 71 MMHG | TEMPERATURE: 98 F | SYSTOLIC BLOOD PRESSURE: 115 MMHG

## 2022-05-10 DIAGNOSIS — I83.009 VENOUS STASIS ULCERS (HCC): ICD-10-CM

## 2022-05-10 DIAGNOSIS — L97.909 VENOUS STASIS ULCERS (HCC): ICD-10-CM

## 2022-05-10 DIAGNOSIS — L03.90 RECURRENT CELLULITIS: Primary | ICD-10-CM

## 2022-05-10 PROCEDURE — 96365 THER/PROPH/DIAG IV INF INIT: CPT

## 2022-05-10 NOTE — PROGRESS NOTES
Education Record    Learner:  Patient    Disease / Diagnosis: recurrent cellulitus     Barriers / Limitations:  None   Comments:    Method:  Brief focused   Comments:    General Topics:  Plan of care reviewed   Comments:    Outcome:  Shows understanding   Comments:    Patient tolerated IV abx infusion, flushed PICC line and alcohol cap placed, will continue to monitor.

## 2022-05-11 ENCOUNTER — NURSE ONLY (OUTPATIENT)
Dept: HEMATOLOGY/ONCOLOGY | Facility: HOSPITAL | Age: 72
End: 2022-05-11
Attending: INTERNAL MEDICINE
Payer: MEDICARE

## 2022-05-11 VITALS
RESPIRATION RATE: 18 BRPM | OXYGEN SATURATION: 94 % | HEART RATE: 90 BPM | TEMPERATURE: 98 F | SYSTOLIC BLOOD PRESSURE: 149 MMHG | DIASTOLIC BLOOD PRESSURE: 77 MMHG

## 2022-05-11 DIAGNOSIS — L03.90 RECURRENT CELLULITIS: Primary | ICD-10-CM

## 2022-05-11 DIAGNOSIS — I83.009 VENOUS STASIS ULCERS (HCC): ICD-10-CM

## 2022-05-11 DIAGNOSIS — L97.909 VENOUS STASIS ULCERS (HCC): ICD-10-CM

## 2022-05-11 PROCEDURE — 96365 THER/PROPH/DIAG IV INF INIT: CPT

## 2022-05-11 NOTE — PROGRESS NOTES
Education Record    Learner:  Patient    Disease / Diagnosis: Pt here for rocephin infusion    Barriers / Limitations:  None    Method:  Brief focused, printed material and  reinforcement    General Topics:  Plan of care reviewed    Outcome:  Shows understanding    Pt tolerated infusion. Pt left in stable condition.

## 2022-05-12 ENCOUNTER — NURSE ONLY (OUTPATIENT)
Dept: HEMATOLOGY/ONCOLOGY | Facility: HOSPITAL | Age: 72
End: 2022-05-12
Attending: INTERNAL MEDICINE
Payer: MEDICARE

## 2022-05-12 VITALS
WEIGHT: 288.5 LBS | OXYGEN SATURATION: 96 % | RESPIRATION RATE: 18 BRPM | BODY MASS INDEX: 48 KG/M2 | TEMPERATURE: 99 F | DIASTOLIC BLOOD PRESSURE: 70 MMHG | SYSTOLIC BLOOD PRESSURE: 130 MMHG | HEART RATE: 83 BPM

## 2022-05-12 DIAGNOSIS — I83.009 VENOUS STASIS ULCERS (HCC): ICD-10-CM

## 2022-05-12 DIAGNOSIS — L97.909 VENOUS STASIS ULCERS (HCC): ICD-10-CM

## 2022-05-12 DIAGNOSIS — L03.90 RECURRENT CELLULITIS: Primary | ICD-10-CM

## 2022-05-12 PROCEDURE — 96365 THER/PROPH/DIAG IV INF INIT: CPT

## 2022-05-12 NOTE — PROGRESS NOTES
Education Record    Learner:  Patient    Disease / Diagnosis: rocephin 2g    Barriers / Limitations:  None   Comments:    Method:  Brief focused   Comments:    General Topics:  Plan of care reviewed   Comments:    Outcome:  Shows understanding   Comments:

## 2022-05-13 ENCOUNTER — NURSE ONLY (OUTPATIENT)
Dept: HEMATOLOGY/ONCOLOGY | Facility: HOSPITAL | Age: 72
End: 2022-05-13
Attending: INTERNAL MEDICINE
Payer: MEDICARE

## 2022-05-13 VITALS
TEMPERATURE: 98 F | OXYGEN SATURATION: 96 % | HEART RATE: 81 BPM | RESPIRATION RATE: 18 BRPM | SYSTOLIC BLOOD PRESSURE: 145 MMHG | DIASTOLIC BLOOD PRESSURE: 81 MMHG

## 2022-05-13 DIAGNOSIS — L97.909 VENOUS STASIS ULCERS (HCC): ICD-10-CM

## 2022-05-13 DIAGNOSIS — L03.90 RECURRENT CELLULITIS: Primary | ICD-10-CM

## 2022-05-13 DIAGNOSIS — I83.009 VENOUS STASIS ULCERS (HCC): ICD-10-CM

## 2022-05-13 PROCEDURE — 96365 THER/PROPH/DIAG IV INF INIT: CPT

## 2022-05-13 NOTE — PROGRESS NOTES
Education Record    Learner:  Patient    Disease / Diagnosis: cellulitis: rocephin     Barriers / Limitations:  None   Comments:    Method:  Brief focused and Discussion   Comments:    General Topics:  Side effects and symptom management   Comments:    Outcome:  Shows understanding   Comments:

## 2022-05-14 ENCOUNTER — NURSE ONLY (OUTPATIENT)
Dept: HEMATOLOGY/ONCOLOGY | Facility: HOSPITAL | Age: 72
End: 2022-05-14
Attending: INTERNAL MEDICINE
Payer: MEDICARE

## 2022-05-14 VITALS
OXYGEN SATURATION: 98 % | TEMPERATURE: 98 F | RESPIRATION RATE: 18 BRPM | DIASTOLIC BLOOD PRESSURE: 92 MMHG | SYSTOLIC BLOOD PRESSURE: 168 MMHG | HEART RATE: 87 BPM

## 2022-05-14 DIAGNOSIS — I83.009 VENOUS STASIS ULCERS (HCC): ICD-10-CM

## 2022-05-14 DIAGNOSIS — L03.90 RECURRENT CELLULITIS: Primary | ICD-10-CM

## 2022-05-14 DIAGNOSIS — L97.909 VENOUS STASIS ULCERS (HCC): ICD-10-CM

## 2022-05-14 PROCEDURE — 96365 THER/PROPH/DIAG IV INF INIT: CPT

## 2022-05-14 NOTE — PROGRESS NOTES
Education Record    Learner:  Patient    Disease / Diagnosis: Ertapenem. Tolerated well.      Barriers / Limitations:  None   Comments:    Method:  Discussion   Comments:    General Topics:  Plan of care reviewed   Comments:    Outcome:  Shows understanding   Comments:

## 2022-05-15 ENCOUNTER — NURSE ONLY (OUTPATIENT)
Dept: HEMATOLOGY/ONCOLOGY | Facility: HOSPITAL | Age: 72
End: 2022-05-15
Attending: INTERNAL MEDICINE
Payer: MEDICARE

## 2022-05-15 VITALS
SYSTOLIC BLOOD PRESSURE: 117 MMHG | RESPIRATION RATE: 18 BRPM | DIASTOLIC BLOOD PRESSURE: 57 MMHG | OXYGEN SATURATION: 99 % | HEART RATE: 65 BPM | TEMPERATURE: 97 F

## 2022-05-15 DIAGNOSIS — L97.909 VENOUS STASIS ULCERS (HCC): ICD-10-CM

## 2022-05-15 DIAGNOSIS — L03.90 RECURRENT CELLULITIS: Primary | ICD-10-CM

## 2022-05-15 DIAGNOSIS — I83.009 VENOUS STASIS ULCERS (HCC): ICD-10-CM

## 2022-05-15 PROCEDURE — 96365 THER/PROPH/DIAG IV INF INIT: CPT

## 2022-05-15 NOTE — PROGRESS NOTES
Education Record    Learner:  Patient    Disease / Diagnosis: ceftriaxone    Barriers / Limitations:  None   Comments:    Method:  Discussion   Comments:    General Topics:  Plan of care reviewed   Comments:    Outcome:  Shows understanding   Comments:    Ceftriaxone infused per MD order without incident. Tolerated well. Reviewed next appointment. Discharged home in stable condition, no new complaints.

## 2022-05-16 ENCOUNTER — NURSE ONLY (OUTPATIENT)
Dept: HEMATOLOGY/ONCOLOGY | Facility: HOSPITAL | Age: 72
End: 2022-05-16
Attending: INTERNAL MEDICINE
Payer: MEDICARE

## 2022-05-16 VITALS
DIASTOLIC BLOOD PRESSURE: 80 MMHG | TEMPERATURE: 97 F | SYSTOLIC BLOOD PRESSURE: 144 MMHG | OXYGEN SATURATION: 96 % | HEART RATE: 88 BPM | RESPIRATION RATE: 18 BRPM

## 2022-05-16 DIAGNOSIS — L03.90 RECURRENT CELLULITIS: Primary | ICD-10-CM

## 2022-05-16 DIAGNOSIS — I83.009 VENOUS STASIS ULCERS (HCC): ICD-10-CM

## 2022-05-16 DIAGNOSIS — L97.909 VENOUS STASIS ULCERS (HCC): ICD-10-CM

## 2022-05-16 LAB
ALBUMIN SERPL-MCNC: 3.5 G/DL (ref 3.4–5)
ALBUMIN/GLOB SERPL: 1 {RATIO} (ref 1–2)
ALP LIVER SERPL-CCNC: 92 U/L
ALT SERPL-CCNC: 29 U/L
ANION GAP SERPL CALC-SCNC: 7 MMOL/L (ref 0–18)
AST SERPL-CCNC: 19 U/L (ref 15–37)
BASOPHILS # BLD AUTO: 0.03 X10(3) UL (ref 0–0.2)
BASOPHILS NFR BLD AUTO: 0.4 %
BILIRUB SERPL-MCNC: 0.4 MG/DL (ref 0.1–2)
BUN BLD-MCNC: 19 MG/DL (ref 7–18)
CALCIUM BLD-MCNC: 9.1 MG/DL (ref 8.5–10.1)
CHLORIDE SERPL-SCNC: 102 MMOL/L (ref 98–112)
CO2 SERPL-SCNC: 27 MMOL/L (ref 21–32)
CREAT BLD-MCNC: 1.15 MG/DL
CRP SERPL-MCNC: 1.73 MG/DL (ref ?–0.3)
EOSINOPHIL # BLD AUTO: 0.16 X10(3) UL (ref 0–0.7)
EOSINOPHIL NFR BLD AUTO: 1.9 %
ERYTHROCYTE [DISTWIDTH] IN BLOOD BY AUTOMATED COUNT: 14.2 %
FASTING STATUS PATIENT QL REPORTED: NO
GLOBULIN PLAS-MCNC: 3.5 G/DL (ref 2.8–4.4)
GLUCOSE BLD-MCNC: 201 MG/DL (ref 70–99)
HCT VFR BLD AUTO: 43.6 %
HGB BLD-MCNC: 14.1 G/DL
IMM GRANULOCYTES # BLD AUTO: 0.05 X10(3) UL (ref 0–1)
IMM GRANULOCYTES NFR BLD: 0.6 %
LYMPHOCYTES # BLD AUTO: 0.99 X10(3) UL (ref 1–4)
LYMPHOCYTES NFR BLD AUTO: 11.9 %
MCH RBC QN AUTO: 29.7 PG (ref 26–34)
MCHC RBC AUTO-ENTMCNC: 32.3 G/DL (ref 31–37)
MCV RBC AUTO: 91.8 FL
MONOCYTES # BLD AUTO: 0.61 X10(3) UL (ref 0.1–1)
MONOCYTES NFR BLD AUTO: 7.3 %
NEUTROPHILS # BLD AUTO: 6.49 X10 (3) UL (ref 1.5–7.7)
NEUTROPHILS # BLD AUTO: 6.49 X10(3) UL (ref 1.5–7.7)
NEUTROPHILS NFR BLD AUTO: 77.9 %
OSMOLALITY SERPL CALC.SUM OF ELEC: 290 MOSM/KG (ref 275–295)
PLATELET # BLD AUTO: 224 10(3)UL (ref 150–450)
POTASSIUM SERPL-SCNC: 4.3 MMOL/L (ref 3.5–5.1)
PROT SERPL-MCNC: 7 G/DL (ref 6.4–8.2)
RBC # BLD AUTO: 4.75 X10(6)UL
SODIUM SERPL-SCNC: 136 MMOL/L (ref 136–145)
WBC # BLD AUTO: 8.3 X10(3) UL (ref 4–11)

## 2022-05-16 PROCEDURE — 85025 COMPLETE CBC W/AUTO DIFF WBC: CPT

## 2022-05-16 PROCEDURE — 80053 COMPREHEN METABOLIC PANEL: CPT

## 2022-05-16 PROCEDURE — 86140 C-REACTIVE PROTEIN: CPT

## 2022-05-16 NOTE — PATIENT INSTRUCTIONS
Education Record    Learner:  Patient    Disease / Diagnosis:cellulitis    Barriers / Limitations:  None    Method:  Brief focused, printed material and  reinforcement    General Topics:  Plan of care reviewed    Outcome:  Shows understanding      Last day of rocephin. Order received to remove PICC line after tx. Labs drawn per order.

## 2022-05-16 NOTE — PROGRESS NOTES
Education Record    Learner:  Patient    Disease / Diagnosis:cellulitis    Barriers / Limitations:  None    Method:  Brief focused, printed material and  reinforcement    General Topics:  Plan of care reviewed    Outcome:  Shows understanding      Last day of rocephin. Order received to remove PICC line after tx. Labs drawn per order. Pressure dressing over PICC line removal. No bleeding noted before departure.

## 2022-05-17 ENCOUNTER — APPOINTMENT (OUTPATIENT)
Dept: HEMATOLOGY/ONCOLOGY | Facility: HOSPITAL | Age: 72
End: 2022-05-17
Attending: INTERNAL MEDICINE
Payer: MEDICARE

## 2022-05-19 ENCOUNTER — HOSPITAL ENCOUNTER (EMERGENCY)
Facility: HOSPITAL | Age: 72
Discharge: HOME | End: 2022-05-19
Attending: EMERGENCY MEDICINE
Payer: MEDICARE

## 2022-05-19 VITALS
RESPIRATION RATE: 22 BRPM | SYSTOLIC BLOOD PRESSURE: 180 MMHG | OXYGEN SATURATION: 95 % | BODY MASS INDEX: 47.32 KG/M2 | HEART RATE: 109 BPM | WEIGHT: 284 LBS | HEIGHT: 65 IN | DIASTOLIC BLOOD PRESSURE: 72 MMHG

## 2022-05-19 DIAGNOSIS — T78.40XA ALLERGIC REACTION, INITIAL ENCOUNTER: Primary | ICD-10-CM

## 2022-05-19 LAB
ALBUMIN SERPL-MCNC: 3.9 G/DL (ref 3.4–5)
ALBUMIN/GLOB SERPL: 1 {RATIO} (ref 1–2)
ALP LIVER SERPL-CCNC: 94 U/L
ALT SERPL-CCNC: 28 U/L
ANION GAP SERPL CALC-SCNC: 8 MMOL/L (ref 0–18)
AST SERPL-CCNC: 20 U/L (ref 15–37)
ATRIAL RATE: 102 BPM
BASOPHILS # BLD AUTO: 0.02 X10(3) UL (ref 0–0.2)
BASOPHILS NFR BLD AUTO: 0.2 %
BILIRUB SERPL-MCNC: 0.9 MG/DL (ref 0.1–2)
BUN BLD-MCNC: 22 MG/DL (ref 7–18)
CALCIUM BLD-MCNC: 9.6 MG/DL (ref 8.5–10.1)
CHLORIDE SERPL-SCNC: 100 MMOL/L (ref 98–112)
CO2 SERPL-SCNC: 25 MMOL/L (ref 21–32)
CREAT BLD-MCNC: 1.34 MG/DL
EOSINOPHIL # BLD AUTO: 0.03 X10(3) UL (ref 0–0.7)
EOSINOPHIL NFR BLD AUTO: 0.3 %
ERYTHROCYTE [DISTWIDTH] IN BLOOD BY AUTOMATED COUNT: 14.1 %
GLOBULIN PLAS-MCNC: 3.8 G/DL (ref 2.8–4.4)
GLUCOSE BLD-MCNC: 188 MG/DL (ref 70–99)
HCT VFR BLD AUTO: 51.4 %
HGB BLD-MCNC: 16.7 G/DL
IMM GRANULOCYTES # BLD AUTO: 0.08 X10(3) UL (ref 0–1)
IMM GRANULOCYTES NFR BLD: 0.7 %
LYMPHOCYTES # BLD AUTO: 0.63 X10(3) UL (ref 1–4)
LYMPHOCYTES NFR BLD AUTO: 5.4 %
MCH RBC QN AUTO: 29.5 PG (ref 26–34)
MCHC RBC AUTO-ENTMCNC: 32.5 G/DL (ref 31–37)
MCV RBC AUTO: 90.7 FL
MONOCYTES # BLD AUTO: 0.27 X10(3) UL (ref 0.1–1)
MONOCYTES NFR BLD AUTO: 2.3 %
NEUTROPHILS # BLD AUTO: 10.6 X10 (3) UL (ref 1.5–7.7)
NEUTROPHILS # BLD AUTO: 10.6 X10(3) UL (ref 1.5–7.7)
NEUTROPHILS NFR BLD AUTO: 91.1 %
NT-PROBNP SERPL-MCNC: 45 PG/ML (ref ?–125)
OSMOLALITY SERPL CALC.SUM OF ELEC: 284 MOSM/KG (ref 275–295)
P AXIS: 47 DEGREES
P-R INTERVAL: 162 MS
PLATELET # BLD AUTO: 226 10(3)UL (ref 150–450)
POTASSIUM SERPL-SCNC: 4.5 MMOL/L (ref 3.5–5.1)
PROT SERPL-MCNC: 7.7 G/DL (ref 6.4–8.2)
Q-T INTERVAL: 310 MS
QRS DURATION: 68 MS
QTC CALCULATION (BEZET): 404 MS
R AXIS: -5 DEGREES
RBC # BLD AUTO: 5.67 X10(6)UL
SODIUM SERPL-SCNC: 133 MMOL/L (ref 136–145)
T AXIS: 20 DEGREES
TROPONIN I HIGH SENSITIVITY: 8 NG/L
VENTRICULAR RATE: 102 BPM
WBC # BLD AUTO: 11.6 X10(3) UL (ref 4–11)

## 2022-05-19 PROCEDURE — 96372 THER/PROPH/DIAG INJ SC/IM: CPT

## 2022-05-19 PROCEDURE — 93005 ELECTROCARDIOGRAM TRACING: CPT

## 2022-05-19 PROCEDURE — 83880 ASSAY OF NATRIURETIC PEPTIDE: CPT | Performed by: EMERGENCY MEDICINE

## 2022-05-19 PROCEDURE — 99284 EMERGENCY DEPT VISIT MOD MDM: CPT

## 2022-05-19 PROCEDURE — S0028 INJECTION, FAMOTIDINE, 20 MG: HCPCS | Performed by: EMERGENCY MEDICINE

## 2022-05-19 PROCEDURE — 80053 COMPREHEN METABOLIC PANEL: CPT | Performed by: EMERGENCY MEDICINE

## 2022-05-19 PROCEDURE — 96374 THER/PROPH/DIAG INJ IV PUSH: CPT

## 2022-05-19 PROCEDURE — 93010 ELECTROCARDIOGRAM REPORT: CPT

## 2022-05-19 PROCEDURE — 85025 COMPLETE CBC W/AUTO DIFF WBC: CPT | Performed by: EMERGENCY MEDICINE

## 2022-05-19 PROCEDURE — 84484 ASSAY OF TROPONIN QUANT: CPT | Performed by: EMERGENCY MEDICINE

## 2022-05-19 PROCEDURE — 99285 EMERGENCY DEPT VISIT HI MDM: CPT

## 2022-05-19 PROCEDURE — 96375 TX/PRO/DX INJ NEW DRUG ADDON: CPT

## 2022-05-19 RX ORDER — METHYLPREDNISOLONE SODIUM SUCCINATE 125 MG/2ML
125 INJECTION, POWDER, LYOPHILIZED, FOR SOLUTION INTRAMUSCULAR; INTRAVENOUS ONCE
Status: COMPLETED | OUTPATIENT
Start: 2022-05-19 | End: 2022-05-19

## 2022-05-19 RX ORDER — PREDNISONE 20 MG/1
40 TABLET ORAL DAILY
Qty: 8 TABLET | Refills: 0 | Status: SHIPPED | OUTPATIENT
Start: 2022-05-19 | End: 2022-05-23

## 2022-05-19 RX ORDER — DIPHENHYDRAMINE HYDROCHLORIDE 50 MG/ML
25 INJECTION INTRAMUSCULAR; INTRAVENOUS ONCE
Status: COMPLETED | OUTPATIENT
Start: 2022-05-19 | End: 2022-05-19

## 2022-05-19 RX ORDER — FAMOTIDINE 10 MG/ML
20 INJECTION, SOLUTION INTRAVENOUS ONCE
Status: COMPLETED | OUTPATIENT
Start: 2022-05-19 | End: 2022-05-19

## 2022-05-19 NOTE — ED INITIAL ASSESSMENT (HPI)
Pt to the ER via walk in for difficulty breathing d/t a possible allergic reaction. Pt states he went to bed fine but woke up this morning with \"hives all over. Itching. Nausea. And lip swelling. \"    Pt presents to the ER a&ox4. Respirations even and nonlabored. Skin warm and dry. Pt has active upper lip swelling, tongue, and difficulty swallowing.

## 2022-06-20 ENCOUNTER — ANESTHESIA EVENT (OUTPATIENT)
Dept: ENDOSCOPY | Facility: HOSPITAL | Age: 72
End: 2022-06-20
Payer: MEDICARE

## 2022-06-20 ENCOUNTER — ANESTHESIA (OUTPATIENT)
Dept: ENDOSCOPY | Facility: HOSPITAL | Age: 72
End: 2022-06-20
Payer: MEDICARE

## 2022-06-20 ENCOUNTER — HOSPITAL ENCOUNTER (OUTPATIENT)
Facility: HOSPITAL | Age: 72
Setting detail: HOSPITAL OUTPATIENT SURGERY
Discharge: HOME | End: 2022-06-20
Attending: INTERNAL MEDICINE | Admitting: INTERNAL MEDICINE
Payer: MEDICARE

## 2022-06-20 VITALS
DIASTOLIC BLOOD PRESSURE: 92 MMHG | SYSTOLIC BLOOD PRESSURE: 110 MMHG | RESPIRATION RATE: 16 BRPM | TEMPERATURE: 98 F | HEART RATE: 88 BPM | BODY MASS INDEX: 46.32 KG/M2 | OXYGEN SATURATION: 96 % | HEIGHT: 65 IN | WEIGHT: 278 LBS

## 2022-06-20 DIAGNOSIS — Z86.010 PERSONAL HISTORY OF COLONIC POLYPS: ICD-10-CM

## 2022-06-20 DIAGNOSIS — Z80.0 FH: COLON CANCER: ICD-10-CM

## 2022-06-20 LAB — GLUCOSE BLD-MCNC: 106 MG/DL (ref 70–99)

## 2022-06-20 PROCEDURE — 0DBL8ZX EXCISION OF TRANSVERSE COLON, VIA NATURAL OR ARTIFICIAL OPENING ENDOSCOPIC, DIAGNOSTIC: ICD-10-PCS | Performed by: INTERNAL MEDICINE

## 2022-06-20 PROCEDURE — 88305 TISSUE EXAM BY PATHOLOGIST: CPT | Performed by: INTERNAL MEDICINE

## 2022-06-20 PROCEDURE — 82962 GLUCOSE BLOOD TEST: CPT

## 2022-06-20 RX ORDER — SODIUM CHLORIDE, SODIUM LACTATE, POTASSIUM CHLORIDE, CALCIUM CHLORIDE 600; 310; 30; 20 MG/100ML; MG/100ML; MG/100ML; MG/100ML
INJECTION, SOLUTION INTRAVENOUS CONTINUOUS
Status: DISCONTINUED | OUTPATIENT
Start: 2022-06-20 | End: 2022-06-20

## 2022-06-20 RX ORDER — NALOXONE HYDROCHLORIDE 0.4 MG/ML
80 INJECTION, SOLUTION INTRAMUSCULAR; INTRAVENOUS; SUBCUTANEOUS AS NEEDED
Status: DISCONTINUED | OUTPATIENT
Start: 2022-06-20 | End: 2022-06-20

## 2022-06-20 RX ORDER — NICOTINE POLACRILEX 4 MG
30 LOZENGE BUCCAL
Status: DISCONTINUED | OUTPATIENT
Start: 2022-06-20 | End: 2022-06-20

## 2022-06-20 RX ORDER — NICOTINE POLACRILEX 4 MG
15 LOZENGE BUCCAL
Status: DISCONTINUED | OUTPATIENT
Start: 2022-06-20 | End: 2022-06-20

## 2022-06-20 RX ORDER — DEXTROSE MONOHYDRATE 25 G/50ML
50 INJECTION, SOLUTION INTRAVENOUS
Status: DISCONTINUED | OUTPATIENT
Start: 2022-06-20 | End: 2022-06-20

## 2022-06-20 NOTE — OPERATIVE REPORT
Colonoscopy Operative Report    Eder Guidry Patient Status:  Hospital Outpatient Surgery    1/3/1950 MRN [id number]   Location [address] [place] [address] Attending Vasquez Burrows MD   Hosp Day #   0 PCP Lidia Pollock DO     Pre-Operative Diagnosis: FH: colon cancer [Z80.0]  Personal history of colonic polyps [Z86.010]    Post-Operative Diagnosis:  2 diminutive transverse colon polyps both removed with a cold biopsy  Sigmoid diverticulosis  Hemorrhoids      Procedure Performed: COLONOSCOPY with biopsy    Informed Consent: Informed consent for both the procedure and sedation were obtained from the patient. The potentially life-threatening complications of sedation, bleeding,  perforation, transfusion or repeat endoscopy  were reviewed along with the possible need for hospitalization, surgical management, transfusion or repeat endoscopy should one of these complications arise. The patient understands and is agreeable to proceed. Sedation Type: MAC-Patient received sedation with monitored anesthesia provided by an anesthesiologist  Moderate Sedation Time: None. Deep sedation provided by anesthesia. Cecum Withdrawal Time:  7 min    Date of previous colonoscopy:     Procedure Description: The patient was placed in the left lateral decubitus position. After careful digital rectal examination, the Adult colonoscope was inserted into the rectum and advanced to the level of the cecum under direct visualization. The cecum was identified by landmarks, including the appendiceal orifice and ileoceccal valve. Careful examination of the entire colon was performed during withdrawal of the endoscope. The scope was withdrawn to the rectum and retroflexion was performed. The patient tolerated the procedure well with no immediate complications. The patient was transferred to the recovery area in stable condition.   Quality of Preparation: Adequate  Aronchick Bowel Prep Scale:  1 - excellent  Findings:   2 diminutive transverse colon polyps both removed with a cold biopsy  Sigmoid diverticulosis  Hemorrhoids    Recommendations:    Await pathology results. Further recommendations with regards to when the repeat the colonoscopy will be based on these results. Discharge: The patient was given an after visit summary detailing the procedure, findings, recommendations and follow up plans.      Glynn Arriaga MD  6/20/2022  9:34 AM

## 2022-06-20 NOTE — ANESTHESIA POSTPROCEDURE EVALUATION
[address] Patient Status:  Hospital Outpatient Surgery   Age/Gender 67year old male MRN [id number]   Location [address] [address] Attending Gibran Magallon MD   [facility] Day # 0 PCP Sheeba Rodriguez DO       Anesthesia Post-op Note    COLONOSCOPY with forcep polypectomy    Procedure Summary     Date: 06/20/22 Room / Location: [facility] ENDOSCOPY 02 / [facility] ENDOSCOPY    Anesthesia Start: 2833 Anesthesia Stop: 7747    Procedure: COLONOSCOPY with forcep polypectomy (N/A ) Diagnosis:       FH: colon cancer      Personal history of colonic polyps      (polyps, diverticulosis, hemorrhoids)    Surgeons: Gibran Magallon MD Anesthesiologist: Nba Palacios DO    Anesthesia Type: MAC ASA Status: 3          Anesthesia Type: MAC    Vitals Value Taken Time   /69 06/20/22 1012   Temp  06/20/22 1016   Pulse 80 06/20/22 1015   Resp 14 06/20/22 1007   SpO2 100 % 06/20/22 1015   Vitals shown include unvalidated device data. Patient Location: PACU    Anesthesia Type: MAC    Airway Patency: patent    Postop Pain Control: adequate    Mental Status: preanesthetic baseline    Nausea/Vomiting: none    Cardiopulmonary/Hydration status: stable euvolemic    Complications: no apparent anesthesia related complications    Postop vital signs: stable    Dental Exam: Unchanged from Preop    Patient to be discharged from PACU when criteria met.

## 2022-09-02 ENCOUNTER — HOSPITAL ENCOUNTER (INPATIENT)
Facility: HOSPITAL | Age: 72
LOS: 5 days | Discharge: NURSING, REHAB OR LONG-TERM CARE | End: 2022-09-07
Attending: EMERGENCY MEDICINE | Admitting: INTERNAL MEDICINE

## 2022-09-02 ENCOUNTER — HOSPITAL ENCOUNTER (INPATIENT)
Facility: HOSPITAL | Age: 72
LOS: 5 days | Discharge: NURSING, REHAB OR LONG-TERM CARE | DRG: 603 | End: 2022-09-07
Attending: EMERGENCY MEDICINE | Admitting: INTERNAL MEDICINE

## 2022-09-02 DIAGNOSIS — I10 ESSENTIAL HYPERTENSION: ICD-10-CM

## 2022-09-02 DIAGNOSIS — L03.90 RECURRENT CELLULITIS: Primary | ICD-10-CM

## 2022-09-02 DIAGNOSIS — I83.009 VENOUS STASIS ULCER, UNSPECIFIED SITE, UNSPECIFIED ULCER STAGE, UNSPECIFIED WHETHER VARICOSE VEINS PRESENT (HCC): ICD-10-CM

## 2022-09-02 DIAGNOSIS — L97.909 VENOUS STASIS ULCER, UNSPECIFIED SITE, UNSPECIFIED ULCER STAGE, UNSPECIFIED WHETHER VARICOSE VEINS PRESENT (HCC): ICD-10-CM

## 2022-09-02 DIAGNOSIS — I89.0 LYMPHEDEMA: ICD-10-CM

## 2022-09-02 LAB
ALBUMIN SERPL-MCNC: 3.6 G/DL (ref 3.4–5)
ALBUMIN/GLOB SERPL: 1 {RATIO} (ref 1–2)
ALP LIVER SERPL-CCNC: 93 U/L
ALT SERPL-CCNC: 27 U/L
ANION GAP SERPL CALC-SCNC: 2 MMOL/L (ref 0–18)
AST SERPL-CCNC: 25 U/L (ref 15–37)
BASOPHILS # BLD AUTO: 0.03 X10(3) UL (ref 0–0.2)
BASOPHILS NFR BLD AUTO: 0.4 %
BILIRUB SERPL-MCNC: 0.3 MG/DL (ref 0.1–2)
BUN BLD-MCNC: 24 MG/DL (ref 7–18)
CALCIUM BLD-MCNC: 9.5 MG/DL (ref 8.5–10.1)
CHLORIDE SERPL-SCNC: 104 MMOL/L (ref 98–112)
CO2 SERPL-SCNC: 27 MMOL/L (ref 21–32)
CREAT BLD-MCNC: 1.45 MG/DL
CRP SERPL-MCNC: 1.32 MG/DL (ref ?–0.3)
EOSINOPHIL # BLD AUTO: 0.16 X10(3) UL (ref 0–0.7)
EOSINOPHIL NFR BLD AUTO: 1.9 %
ERYTHROCYTE [DISTWIDTH] IN BLOOD BY AUTOMATED COUNT: 13.9 %
EST. AVERAGE GLUCOSE BLD GHB EST-MCNC: 143 MG/DL (ref 68–126)
GFR SERPLBLD BASED ON 1.73 SQ M-ARVRAT: 51 ML/MIN/1.73M2 (ref 60–?)
GLOBULIN PLAS-MCNC: 3.7 G/DL (ref 2.8–4.4)
GLUCOSE BLD-MCNC: 102 MG/DL (ref 70–99)
GLUCOSE BLD-MCNC: 137 MG/DL (ref 70–99)
GLUCOSE BLD-MCNC: 210 MG/DL (ref 70–99)
HBA1C MFR BLD: 6.6 % (ref ?–5.7)
HCT VFR BLD AUTO: 44.6 %
HGB BLD-MCNC: 14.4 G/DL
IMM GRANULOCYTES # BLD AUTO: 0.03 X10(3) UL (ref 0–1)
IMM GRANULOCYTES NFR BLD: 0.4 %
LACTATE SERPL-SCNC: 1.3 MMOL/L (ref 0.4–2)
LYMPHOCYTES # BLD AUTO: 1.12 X10(3) UL (ref 1–4)
LYMPHOCYTES NFR BLD AUTO: 13.6 %
MCH RBC QN AUTO: 29.4 PG (ref 26–34)
MCHC RBC AUTO-ENTMCNC: 32.3 G/DL (ref 31–37)
MCV RBC AUTO: 91 FL
MONOCYTES # BLD AUTO: 0.74 X10(3) UL (ref 0.1–1)
MONOCYTES NFR BLD AUTO: 9 %
NEUTROPHILS # BLD AUTO: 6.16 X10 (3) UL (ref 1.5–7.7)
NEUTROPHILS # BLD AUTO: 6.16 X10(3) UL (ref 1.5–7.7)
NEUTROPHILS NFR BLD AUTO: 74.7 %
OSMOLALITY SERPL CALC.SUM OF ELEC: 282 MOSM/KG (ref 275–295)
PLATELET # BLD AUTO: 264 10(3)UL (ref 150–450)
POTASSIUM SERPL-SCNC: 4.3 MMOL/L (ref 3.5–5.1)
PROT SERPL-MCNC: 7.3 G/DL (ref 6.4–8.2)
RBC # BLD AUTO: 4.9 X10(6)UL
SARS-COV-2 RNA RESP QL NAA+PROBE: NOT DETECTED
SODIUM SERPL-SCNC: 133 MMOL/L (ref 136–145)
WBC # BLD AUTO: 8.2 X10(3) UL (ref 4–11)

## 2022-09-02 RX ORDER — ENOXAPARIN SODIUM 100 MG/ML
40 INJECTION SUBCUTANEOUS NIGHTLY
Status: DISCONTINUED | OUTPATIENT
Start: 2022-09-02 | End: 2022-09-02

## 2022-09-02 RX ORDER — METOCLOPRAMIDE HYDROCHLORIDE 5 MG/ML
10 INJECTION INTRAMUSCULAR; INTRAVENOUS EVERY 8 HOURS PRN
Status: DISCONTINUED | OUTPATIENT
Start: 2022-09-02 | End: 2022-09-07

## 2022-09-02 RX ORDER — BISACODYL 10 MG
10 SUPPOSITORY, RECTAL RECTAL
Status: DISCONTINUED | OUTPATIENT
Start: 2022-09-02 | End: 2022-09-07

## 2022-09-02 RX ORDER — NICOTINE POLACRILEX 4 MG
30 LOZENGE BUCCAL
Status: DISCONTINUED | OUTPATIENT
Start: 2022-09-02 | End: 2022-09-07

## 2022-09-02 RX ORDER — CARVEDILOL 12.5 MG/1
12.5 TABLET ORAL 2 TIMES DAILY WITH MEALS
Status: DISCONTINUED | OUTPATIENT
Start: 2022-09-02 | End: 2022-09-07

## 2022-09-02 RX ORDER — SODIUM PHOSPHATE, DIBASIC AND SODIUM PHOSPHATE, MONOBASIC 7; 19 G/133ML; G/133ML
1 ENEMA RECTAL ONCE AS NEEDED
Status: DISCONTINUED | OUTPATIENT
Start: 2022-09-02 | End: 2022-09-07

## 2022-09-02 RX ORDER — SENNOSIDES 8.6 MG
17.2 TABLET ORAL NIGHTLY PRN
Status: DISCONTINUED | OUTPATIENT
Start: 2022-09-02 | End: 2022-09-07

## 2022-09-02 RX ORDER — DEXTROSE MONOHYDRATE 25 G/50ML
50 INJECTION, SOLUTION INTRAVENOUS
Status: DISCONTINUED | OUTPATIENT
Start: 2022-09-02 | End: 2022-09-07

## 2022-09-02 RX ORDER — GARLIC EXTRACT 500 MG
1 CAPSULE ORAL DAILY
Status: DISCONTINUED | OUTPATIENT
Start: 2022-09-02 | End: 2022-09-07

## 2022-09-02 RX ORDER — ENOXAPARIN SODIUM 100 MG/ML
0.5 INJECTION SUBCUTANEOUS NIGHTLY
Status: DISCONTINUED | OUTPATIENT
Start: 2022-09-02 | End: 2022-09-07

## 2022-09-02 RX ORDER — NICOTINE POLACRILEX 4 MG
15 LOZENGE BUCCAL
Status: DISCONTINUED | OUTPATIENT
Start: 2022-09-02 | End: 2022-09-07

## 2022-09-02 RX ORDER — POLYETHYLENE GLYCOL 3350 17 G/17G
17 POWDER, FOR SOLUTION ORAL DAILY PRN
Status: DISCONTINUED | OUTPATIENT
Start: 2022-09-02 | End: 2022-09-07

## 2022-09-02 RX ORDER — ALPRAZOLAM 0.5 MG/1
0.5 TABLET ORAL 3 TIMES DAILY PRN
Status: DISCONTINUED | OUTPATIENT
Start: 2022-09-02 | End: 2022-09-07

## 2022-09-02 RX ORDER — ASPIRIN 81 MG/1
81 TABLET ORAL 2 TIMES DAILY
Status: DISCONTINUED | OUTPATIENT
Start: 2022-09-02 | End: 2022-09-07

## 2022-09-02 RX ORDER — ONDANSETRON 2 MG/ML
4 INJECTION INTRAMUSCULAR; INTRAVENOUS EVERY 6 HOURS PRN
Status: DISCONTINUED | OUTPATIENT
Start: 2022-09-02 | End: 2022-09-07

## 2022-09-02 NOTE — ED INITIAL ASSESSMENT (HPI)
Patient sent here by PMD for picc line placement so he can be sent to a nursing facility for abx infusions due to an infection in the left leg.

## 2022-09-02 NOTE — ED QUICK NOTES
Patient to MAYA martinez now stating that he wants me to contact a specific MD for him regarding his admission. Explained to him that if he was not to be a direct admit and is to be admitted through the ED, then we will contact all the appropriate consults once he is seen by ED MD and RN staff in back. Patient then called his MD office and had this RN speak with staff member who also did not know of the specific MD he wanted to be contacted and the office member told him he would have to wait in ED to be seen and admitted though ER.

## 2022-09-02 NOTE — PLAN OF CARE
Problem: Diabetes/Glucose Control  Goal: Glucose maintained within prescribed range  Description: INTERVENTIONS:  - Monitor Blood Glucose as ordered  - Assess for signs and symptoms of hyperglycemia and hypoglycemia  - Administer ordered medications to maintain glucose within target range  - Assess barriers to adequate nutritional intake and initiate nutrition consult as needed  - Instruct patient on self management of diabetes  Outcome: Progressing

## 2022-09-02 NOTE — PROGRESS NOTES
NURSING ADMISSION NOTE      Patient admitted via Cart from ER with BLE recurrent cellulitis. Oriented to room. Alert and oriented x 4. Safety precautions initiated. Bed in low position. Call light in reach. Admission orders received from Dr. Tre López and IV antibiotic order from Dr. Janina Harrell.

## 2022-09-02 NOTE — PLAN OF CARE
Problem: SAFETY ADULT - FALL  Goal: Free from fall injury  Description: INTERVENTIONS:  - Assess pt frequently for physical needs  - Identify cognitive and physical deficits and behaviors that affect risk of falls.   - [place] fall precautions as indicated by assessment.  - Educate pt/family on patient safety including physical limitations  - Instruct pt to call for assistance with activity based on assessment  - Modify environment to reduce risk of injury  - Provide assistive devices as appropriate  - Consider OT/PT consult to assist with strengthening/mobility  - Encourage toileting schedule  Outcome: Progressing

## 2022-09-02 NOTE — ED QUICK NOTES
Orders for admission, patient is aware of plan and ready to go upstairs.  Any questions, please call ED RN Dillan Watt at extension [phone]    Patient Covid vaccination status: Fully vaccinated     COVID Test Ordered in ED: Rapid SARS-CoV-2 by PCR    COVID Suspicion at Admission: N/A    Running Infusions:  None    Mental Status/LOC at time of transport: 0riented x4    Other pertinent information: na  CIWA score: N/A   NIH score:  N/A

## 2022-09-03 LAB
ANION GAP SERPL CALC-SCNC: 3 MMOL/L (ref 0–18)
BUN BLD-MCNC: 21 MG/DL (ref 7–18)
CALCIUM BLD-MCNC: 9.7 MG/DL (ref 8.5–10.1)
CHLORIDE SERPL-SCNC: 104 MMOL/L (ref 98–112)
CO2 SERPL-SCNC: 27 MMOL/L (ref 21–32)
CREAT BLD-MCNC: 1.43 MG/DL
GFR SERPLBLD BASED ON 1.73 SQ M-ARVRAT: 52 ML/MIN/1.73M2 (ref 60–?)
GLUCOSE BLD-MCNC: 114 MG/DL (ref 70–99)
GLUCOSE BLD-MCNC: 134 MG/DL (ref 70–99)
GLUCOSE BLD-MCNC: 144 MG/DL (ref 70–99)
GLUCOSE BLD-MCNC: 86 MG/DL (ref 70–99)
GLUCOSE BLD-MCNC: 86 MG/DL (ref 70–99)
OSMOLALITY SERPL CALC.SUM OF ELEC: 280 MOSM/KG (ref 275–295)
POTASSIUM SERPL-SCNC: 4.7 MMOL/L (ref 3.5–5.1)
SODIUM SERPL-SCNC: 134 MMOL/L (ref 136–145)

## 2022-09-03 PROCEDURE — B548ZZA ULTRASONOGRAPHY OF SUPERIOR VENA CAVA, GUIDANCE: ICD-10-PCS | Performed by: INTERNAL MEDICINE

## 2022-09-03 PROCEDURE — 02HV33Z INSERTION OF INFUSION DEVICE INTO SUPERIOR VENA CAVA, PERCUTANEOUS APPROACH: ICD-10-PCS | Performed by: INTERNAL MEDICINE

## 2022-09-03 RX ORDER — FUROSEMIDE 20 MG/1
20 TABLET ORAL DAILY
Status: DISCONTINUED | OUTPATIENT
Start: 2022-09-03 | End: 2022-09-07

## 2022-09-03 RX ORDER — CLONIDINE HYDROCHLORIDE 0.1 MG/1
0.2 TABLET ORAL DAILY
Status: DISCONTINUED | OUTPATIENT
Start: 2022-09-03 | End: 2022-09-07

## 2022-09-03 RX ORDER — DIPHENHYDRAMINE HCL 25 MG
25 CAPSULE ORAL EVERY 6 HOURS PRN
Status: DISCONTINUED | OUTPATIENT
Start: 2022-09-03 | End: 2022-09-07

## 2022-09-03 RX ORDER — SPIRONOLACTONE 25 MG/1
50 TABLET ORAL DAILY
Status: DISCONTINUED | OUTPATIENT
Start: 2022-09-03 | End: 2022-09-07

## 2022-09-03 RX ORDER — LIDOCAINE HYDROCHLORIDE 10 MG/ML
5 INJECTION, SOLUTION EPIDURAL; INFILTRATION; INTRACAUDAL; PERINEURAL
Status: COMPLETED | OUTPATIENT
Start: 2022-09-03 | End: 2022-09-03

## 2022-09-03 RX ORDER — HYDRALAZINE HYDROCHLORIDE 50 MG/1
50 TABLET, FILM COATED ORAL 3 TIMES DAILY
Status: DISCONTINUED | OUTPATIENT
Start: 2022-09-03 | End: 2022-09-07

## 2022-09-03 NOTE — VASCULAR ACCESS
Spoke to  about placing PICC line with blood cultures pending with orders for OK to place [facility] line without Blood culture results.

## 2022-09-03 NOTE — PLAN OF CARE
A/o x4. RA/. Regular diet with accuchecks. Voiding without difficulty. LBM 9/2. Pt c/o of chronic pain to BLE declined medication at this time. Up independently. IV SL. Iv abx given as ordered. Ble covered with abd and ioban dsg. Non pitting edema noted. Blood cx pending. Plan tbd. Poc updated with pt. All safety measures in place. Call light within reach instructed to call for help or assistance.

## 2022-09-03 NOTE — PLAN OF CARE
Assumed patient care at 0730. Vital signs stable. Patient alert and oriented x 4. Patient denies pain. Per patient, merrem was making him \"foggy\" and with \"pain to the left side of his face and teeth\". Per his request, merrem infusion stopped. Paged ID and order switched to zosyn after her assessment. Lower extremity dressings changed. Problem: Patient/Family Goals  Goal: Patient/Family Long Term Goal  Description: Patient's long Term Goal: be pain and infection free    Interventions:   - Pain medication  -Healthy diet  -monitor vitals  -antibiotics   - See additional Care Plan goals for specific interventions      Outcome: Progressing  Goal: Patient/Family Short Term Goal  Description: Patient's Short Term Goal: be pain and infection free    Interventions:   - Pain medication  -Healthy diet  -monitor vitals  -antibiotics   - See additional Care Plan goals for specific interventions    Outcome: Progressing     Problem: SAFETY ADULT - FALL  Goal: Free from fall injury  Description: INTERVENTIONS:  - Assess pt frequently for physical needs  - Identify cognitive and physical deficits and behaviors that affect risk of falls.   - [place] fall precautions as indicated by assessment.  - Educate pt/family on patient safety including physical limitations  - Instruct pt to call for assistance with activity based on assessment  - Modify environment to reduce risk of injury  - Provide assistive devices as appropriate  - Consider OT/PT consult to assist with strengthening/mobility  - Encourage toileting schedule  Outcome: Progressing     Problem: Diabetes/Glucose Control  Goal: Glucose maintained within prescribed range  Description: INTERVENTIONS:  - Monitor Blood Glucose as ordered  - Assess for signs and symptoms of hyperglycemia and hypoglycemia  - Administer ordered medications to maintain glucose within target range  - Assess barriers to adequate nutritional intake and initiate nutrition consult as needed  - Instruct patient on self management of diabetes  Outcome: Progressing

## 2022-09-04 LAB
GLUCOSE BLD-MCNC: 125 MG/DL (ref 70–99)
GLUCOSE BLD-MCNC: 127 MG/DL (ref 70–99)
GLUCOSE BLD-MCNC: 149 MG/DL (ref 70–99)
GLUCOSE BLD-MCNC: 173 MG/DL (ref 70–99)

## 2022-09-04 NOTE — PLAN OF CARE
A/O VSS on room air. Denies pain. Bilateral lower legs dressings clean dry and intact. IV antibiotics as ordered. Patient up ad lennie in room. PICC in place. Plan of care reviewed. Call light within reach.

## 2022-09-05 LAB
ALBUMIN SERPL-MCNC: 3.4 G/DL (ref 3.4–5)
ALBUMIN/GLOB SERPL: 0.9 {RATIO} (ref 1–2)
ALP LIVER SERPL-CCNC: 83 U/L
ALT SERPL-CCNC: 26 U/L
ANION GAP SERPL CALC-SCNC: 8 MMOL/L (ref 0–18)
AST SERPL-CCNC: 14 U/L (ref 15–37)
BASOPHILS # BLD AUTO: 0.02 X10(3) UL (ref 0–0.2)
BASOPHILS NFR BLD AUTO: 0.3 %
BILIRUB SERPL-MCNC: 0.5 MG/DL (ref 0.1–2)
BUN BLD-MCNC: 22 MG/DL (ref 7–18)
CALCIUM BLD-MCNC: 9.4 MG/DL (ref 8.5–10.1)
CHLORIDE SERPL-SCNC: 102 MMOL/L (ref 98–112)
CO2 SERPL-SCNC: 26 MMOL/L (ref 21–32)
CREAT BLD-MCNC: 1.55 MG/DL
EOSINOPHIL # BLD AUTO: 0.17 X10(3) UL (ref 0–0.7)
EOSINOPHIL NFR BLD AUTO: 2.3 %
ERYTHROCYTE [DISTWIDTH] IN BLOOD BY AUTOMATED COUNT: 13.9 %
GFR SERPLBLD BASED ON 1.73 SQ M-ARVRAT: 47 ML/MIN/1.73M2 (ref 60–?)
GLOBULIN PLAS-MCNC: 3.9 G/DL (ref 2.8–4.4)
GLUCOSE BLD-MCNC: 120 MG/DL (ref 70–99)
GLUCOSE BLD-MCNC: 130 MG/DL (ref 70–99)
GLUCOSE BLD-MCNC: 134 MG/DL (ref 70–99)
GLUCOSE BLD-MCNC: 150 MG/DL (ref 70–99)
GLUCOSE BLD-MCNC: 194 MG/DL (ref 70–99)
HCT VFR BLD AUTO: 46.5 %
HGB BLD-MCNC: 14.8 G/DL
IMM GRANULOCYTES # BLD AUTO: 0.04 X10(3) UL (ref 0–1)
IMM GRANULOCYTES NFR BLD: 0.5 %
LYMPHOCYTES # BLD AUTO: 0.99 X10(3) UL (ref 1–4)
LYMPHOCYTES NFR BLD AUTO: 13.5 %
MCH RBC QN AUTO: 29.2 PG (ref 26–34)
MCHC RBC AUTO-ENTMCNC: 31.8 G/DL (ref 31–37)
MCV RBC AUTO: 91.7 FL
MONOCYTES # BLD AUTO: 0.51 X10(3) UL (ref 0.1–1)
MONOCYTES NFR BLD AUTO: 7 %
NEUTROPHILS # BLD AUTO: 5.58 X10 (3) UL (ref 1.5–7.7)
NEUTROPHILS # BLD AUTO: 5.58 X10(3) UL (ref 1.5–7.7)
NEUTROPHILS NFR BLD AUTO: 76.4 %
OSMOLALITY SERPL CALC.SUM OF ELEC: 291 MOSM/KG (ref 275–295)
PLATELET # BLD AUTO: 256 10(3)UL (ref 150–450)
POTASSIUM SERPL-SCNC: 4.2 MMOL/L (ref 3.5–5.1)
PROT SERPL-MCNC: 7.3 G/DL (ref 6.4–8.2)
RBC # BLD AUTO: 5.07 X10(6)UL
SODIUM SERPL-SCNC: 136 MMOL/L (ref 136–145)
WBC # BLD AUTO: 7.3 X10(3) UL (ref 4–11)

## 2022-09-05 NOTE — PLAN OF CARE
Patient A&O X4 on RA. VSS, /IS. Refusing SCDs and lovenox. Regular diet with accu checks QID. Voiding freely, LBM 9/4. RUE PICC- on IV Zosyn Q8h. Wounds to BLE covered with ABD/ACE, c/d/i. Hx neuropathy/lymphedema. Denies pain at this time. Up ad lennie. Reminded to use call light.

## 2022-09-05 NOTE — CM/SW NOTE
09/05/22 1457   CM/SW Referral Data   Referral Source Physician   Reason for Referral Discharge planning   Informant EMR   Patient [address]   Number of Levels in Home 1   Patient lives with Spouse/Significant other   Patient Status Prior to Admission   Services in place prior to admission DME/Supplies at home   Type of DME/Supplies Wheeled Walker   Discharge Needs   Anticipated D/C needs Subacute rehab     Received order to setup deandre for iv abx and wound care. [facility]re referral sent via aidin, will need PASRR completed and Wound care note sent when available. Has medicare, no ins auth required. Has hx of [facility] and [facility]. / to remain available for any further discharge planning needs.     Bianca Bray, RN,   [id number]

## 2022-09-05 NOTE — PLAN OF CARE
Patient A&Ox4, VSS on room air. Dressings to BLE changed this morning, dressing C/D/I, ACE wrap on. IV ABX continued through PICC in RUE. Tolerating regular diet, voiding freely, LBM 9/4. Declines pain mediation for mild pain to BLE. Plan to DC with PICC and long term ABX. Plan of care reviewed with patient, all questions answered, verbalized understanding.

## 2022-09-05 NOTE — PLAN OF CARE
Alert and oriented x4. VSS. Tolerating IV zosyn. Right arm PICC line wrapped with waterproof dressing and pt showered this evening. BLE dressings changed this evening with bacitracin, ABDs, and ace wraps. PICC line dressing changed this evening. Ambulating independently in room. Tolerating regular diet. Voiding freely. Had formed bm today. Plan to discharge to [facility] when ready. Plan of care updated with pt.

## 2022-09-06 LAB
GLUCOSE BLD-MCNC: 125 MG/DL (ref 70–99)
GLUCOSE BLD-MCNC: 134 MG/DL (ref 70–99)
GLUCOSE BLD-MCNC: 169 MG/DL (ref 70–99)
GLUCOSE BLD-MCNC: 172 MG/DL (ref 70–99)

## 2022-09-06 NOTE — PLAN OF CARE
BLE red and fragile. Wound RN at bedside to assess and apply dressings. Patient reports chronic generalized pain, denies new or worsening symptoms. Tolearting regular diet. Voiding without difficulty. Ambulating independently. SW following for discharge planning. Plan of care discussed with patient who agrees.

## 2022-09-06 NOTE — CM/SW NOTE
09/06/22 1213   Choice of Post-Acute Provider   Informed patient of right to choose their preferred provider Yes   List of appropriate post-acute services provided to patient/family with quality data Yes   Patient/family choice [address] given to Patient       Plan for [facility] for IV abx and wound care at [place]. PASRR completed. Met with pt and provided AIDIN list of accepting facilities. Pt chose [facility]. Updated pt's RN. Possible DC once pt seen by podiatry. TH reserved in [address]. / to remain available for support and/or discharge planning.      Cammy Perez [facility]  Discharge Planner  [phone]

## 2022-09-06 NOTE — PLAN OF CARE
Patient is alert and orientated sitting in his bed. Picc line to the right upper arm. IV abx infusing with no issues. No blood return noted. Denies pain at infusion site no swelling or signs of infection. Bilateral lower extremities wrapped for cellulitis, wound care consult to see patient today. Patient up as tolerated, bowel movement this evening is soft. Plan of care discussed with patient. No further questions or concerns at this time.

## 2022-09-07 VITALS
RESPIRATION RATE: 20 BRPM | HEART RATE: 78 BPM | SYSTOLIC BLOOD PRESSURE: 148 MMHG | OXYGEN SATURATION: 96 % | HEIGHT: 65 IN | WEIGHT: 270 LBS | DIASTOLIC BLOOD PRESSURE: 90 MMHG | TEMPERATURE: 99 F | BODY MASS INDEX: 44.98 KG/M2

## 2022-09-07 LAB
GLUCOSE BLD-MCNC: 134 MG/DL (ref 70–99)
GLUCOSE BLD-MCNC: 139 MG/DL (ref 70–99)

## 2022-09-07 PROCEDURE — 99222 1ST HOSP IP/OBS MODERATE 55: CPT | Performed by: STUDENT IN AN ORGANIZED HEALTH CARE EDUCATION/TRAINING PROGRAM

## 2022-09-07 RX ORDER — PIPERACILLIN SODIUM, TAZOBACTAM SODIUM 4; .5 G/20ML; G/20ML
4.5 INJECTION, POWDER, LYOPHILIZED, FOR SOLUTION INTRAVENOUS EVERY 8 HOURS
Qty: 94.5 G | Refills: 0 | Status: SHIPPED
Start: 2022-09-07 | End: 2022-09-14

## 2022-09-07 RX ORDER — CLONIDINE HYDROCHLORIDE 0.2 MG/1
0.2 TABLET ORAL DAILY
Qty: 30 TABLET | Refills: 0 | Status: SHIPPED | OUTPATIENT
Start: 2022-09-07

## 2022-09-07 RX ORDER — CARVEDILOL 12.5 MG/1
12.5 TABLET ORAL 2 TIMES DAILY WITH MEALS
Qty: 60 TABLET | Refills: 1 | Status: SHIPPED | OUTPATIENT
Start: 2022-09-07

## 2022-09-07 RX ORDER — PIPERACILLIN SODIUM, TAZOBACTAM SODIUM 4; .5 G/20ML; G/20ML
4.5 INJECTION, POWDER, LYOPHILIZED, FOR SOLUTION INTRAVENOUS EVERY 6 HOURS
Qty: 126 G | Refills: 0 | Status: SHIPPED | OUTPATIENT
Start: 2022-09-07 | End: 2022-09-07

## 2022-09-07 NOTE — CM/SW NOTE
Spoke with pt's RN who stated patient can discharge to [facility] today. Spoke with Rachel from [facility] [place] who confirmed pt can be accepted for admission today. [facility] transport scheduled for 12:30pm.  They [facility]. PCS form completed and available for RN to print. Attempted to update pt - pt currently unavailable. Updated pt's RN. / to remain available for support and/or discharge planning.      [facility]  [address] [facility]    Hui Edmond LCSW  Discharge Planner  [phone]

## 2022-09-07 NOTE — PROGRESS NOTES
Patient cleared by all MD's for discharge. Scripts and paperwork sent with transport. Belongings taken by patient.

## 2022-09-07 NOTE — PLAN OF CARE
A&Ox4. VSS. On room air. . IS encouraged. Refusing SCDs. Tolerating diet. Last BM 9/6. Voiding freely. Denies pain. Dressing to BLE C/D/I. Up ad lennie. Plan is to dc to [facility] on IV antibiotics - PICC line in place to RUE. Patient updated on plan of care. Safety precautions in place. Call light within reach. Will continue to monitor.

## 2022-09-07 NOTE — PLAN OF CARE
Patient A&O X4 on RA. VSS, /IS. Refusing SCDs and lovenox. Regular diet with accu checks QID. Voiding freely, LBM 9/6. RUE PICC- on IV Zosyn Q8h. Wounds to BLE covered with ABD/spandagrip, c/d/i- orders to change daily. Hx neuropathy/lymphedema. Denies pain at this time. Up ad lennie. Reminded to use call light. Plan for dc to [facility] possibly tomorrow and podiatry to see patient.

## 2022-09-08 ENCOUNTER — INITIAL APN SNF VISIT (OUTPATIENT)
Dept: INTERNAL MEDICINE CLINIC | Age: 72
End: 2022-09-08

## 2022-09-08 DIAGNOSIS — L03.90 RECURRENT CELLULITIS: ICD-10-CM

## 2022-09-08 DIAGNOSIS — E11.9 CONTROLLED TYPE 2 DIABETES MELLITUS WITHOUT COMPLICATION, WITHOUT LONG-TERM CURRENT USE OF INSULIN (HCC): ICD-10-CM

## 2022-09-08 DIAGNOSIS — Z91.89 AT RISK FOR CONSTIPATION: ICD-10-CM

## 2022-09-08 DIAGNOSIS — Z79.2 RECEIVING INTRAVENOUS ANTIBIOTIC TREATMENT AS OUTPATIENT: ICD-10-CM

## 2022-09-08 DIAGNOSIS — Z79.899 MEDICATION MANAGEMENT: ICD-10-CM

## 2022-09-09 VITALS
RESPIRATION RATE: 18 BRPM | SYSTOLIC BLOOD PRESSURE: 125 MMHG | HEART RATE: 73 BPM | DIASTOLIC BLOOD PRESSURE: 77 MMHG | OXYGEN SATURATION: 95 % | TEMPERATURE: 98 F

## 2022-09-09 RX ORDER — POLYETHYLENE GLYCOL 3350 17 G/17G
17 POWDER, FOR SOLUTION ORAL DAILY PRN
COMMUNITY

## 2022-09-09 RX ORDER — AMOXICILLIN 250 MG
1 CAPSULE ORAL NIGHTLY
COMMUNITY

## 2022-09-09 RX ORDER — ACETAMINOPHEN 325 MG/1
650 TABLET ORAL EVERY 6 HOURS PRN
COMMUNITY

## 2022-09-09 NOTE — PROGRESS NOTES
Jazmine Webster  81.: MARTINEZ Mendez     1/3/1950 MRN [id number]   Luis Coyle  Admission 22      Last Hospital Discharge 22 PCP Jed Recio DO     HPI:      Lauren Neumann is a 67year old male admitted to SNF for sub-acute rehabilitation. Hospital Discharge Diagnoses:  Recurrent pseudomonas cellulitis, chronic lymphedema, HTN, DM 2, hx c diff. Admitted due to cellulitis with open wounds to BLEs; sent to SNF for continued IV antibiotics, wound care and therapy. Chief Complaint at visit:   Patient presents with:  Hospital F/U  Weakness  Medication Follow-Up       ALLERGIES    He is allergic to amoxicillin-pot clavulanate, cefoxitin, hibiclens [chlorhexidine], ibuprofen, isothiazolinone chloride, prednisone, quaternium-15, tetracycline base, acetaminophen, cipro [ciprofloxacin], lisinopril, advair [advair hfa], amitriptyline hcl, avapro [irbesartan], celebrex [celecoxib], fluticasone-salmeterol, hyzaar, montelukast, rofecoxib, singulair, verapamil hcl, and zithromax [azithromycin]. CURRENT MEDS:    carvedilol 12.5 MG Oral Tab, Take 1 tablet (12.5 mg total) by mouth 2 (two) times daily with meals. Patient takes this medication once a day  cloNIDine 0.2 MG Oral Tab, Take 1 tablet (0.2 mg total) by mouth daily. Patient takes this medication only once a day  piperacillin-tazobactam (ZOSYN) 4.5 (4-0.5) g Intravenous Recon Soln, Inject 4,500 mg (4.5 g total) into the vein every 8 (eight) hours for 7 days. MICROLET LANCETS Does not apply Misc, Twice daily  CONTOUR NEXT TEST In Vitro Strip, Twice daily  pioglitazone 30 MG Oral Tab, Take 1 tablet (30 mg total) by mouth daily. glipiZIDE 5 MG Oral Tab, Take 2 tablets in the morning and 1 tablet in the evening. mupirocin 2 % External Ointment, Apply 1 Application topically 3 (three) times daily. (Patient taking differently: Apply 1 Application topically 3 (three) times daily.  Apply to all skin ulcer on both lower legs)  HYDRALAZINE 50 MG Oral Tab, TAKE 1 TABLET BY MOUTH THREE TIMES DAILY (Patient taking differently: Patient take this medication only once a day)  SPIRONOLACTONE 50 MG Oral Tab, Take 1 tablet by mouth once daily  ALPRAZolam 1 MG Oral Tab, 1/2 tablet  3 times daily prn  [DISCONTINUED] cloNIDine 0.2 MG Oral Tab, Take 1 tablet (0.2 mg total) by mouth 2 (two) times daily. (Patient taking differently: Take 0.2 mg by mouth 2 (two) times daily. Patient takes this medication only once a day)  ergocalciferol 1.25 MG (15590 UT) Oral Cap, Take 1 capsule (50,000 Units total) by mouth once a week. furosemide 20 MG Oral Tab, Take 1 tablet (20 mg total) by mouth daily as needed (leg swelling). Fluticasone Propionate 50 MCG/ACT Nasal Suspension, 1 spray by Nasal route daily as needed. Cetirizine HCl 10 MG Oral Cap, Take by mouth daily as needed. TESTOSTERONE CYPIONATE IM, Inject 200 mg into the muscle. .6 ml into muscle every 2 weeks . aspirin 81 MG Oral Tab EC, Take 81 mg by mouth 2 (two) times daily. Acidophilus/Pectin (PROBIOTIC) Oral Cap, Take 1 capsule by mouth daily. No current facility-administered medications on file prior to visit. HISTORY:    He  has a past medical history of Acute pancreatitis (6/19/2019), Anesthesia complication, Anxiety, Asthma, Back problem, Blood disorder, Colon polyp (2013), Diabetes ([facility] [address] Heart attack ([facility] [address] (11/8/2001), High blood pressure, diseases NEC, Neuropathy, Ocular migraine, Osteoarthritis, Panic attacks, Polycythemia, Seizure disorder ([address] Visual impairment, and Vitamin D deficiency (10/12/2009). He  has a past surgical history that includes colonoscopy,diagnostic; appendectomy (1967 ); colonoscopy,biopsy (1/10/2014); patient withough preoperative order for iv antibiotic surgical site infection prophylaxis.  (1/10/2014); patient documented not to have experienced any of the following events (1/10/2014); inj for sacroiliac jt anesth (1/27/2014); inj for sacroiliac jt anesth (1/27/2014); patient withough preoperative order for iv antibiotic surgical site infection prophylaxis. (1/27/2014); patient documented not to have experienced any of the following events (1/27/2014); inject nerv blck,othr periph nerv (Bilateral, 12/19/2014); inject nerv blck,othr periph nerv (Bilateral, 12/19/2014); inject nerv blck,othr periph nerv (Bilateral, 12/19/2014); inject nerv blck,othr periph nerv (Bilateral, 12/19/2014); inject nerv blck,othr periph nerv (Bilateral, 12/19/2014); inject nerv blck,othr periph nerv (Bilateral, 12/19/2014); inject nerv blck,othr periph nerv (Bilateral, 12/19/2014); inject nerv blck,othr periph nerv (Bilateral, 12/19/2014); fluoroscopic guidance needle placement (Bilateral, 12/19/2014); m-sedaj by [facility] 5+ yr (Bilateral, 12/19/2014); patient withough preoperative order for iv antibiotic surgical site infection prophylaxis. (Bilateral, 12/19/2014); patient documented not to have experienced any of the following events (Bilateral, 12/19/2014); inject nerv blck,othr periph nerv (N/A, 1/5/2015); inject nerv blck,othr periph nerv (N/A, 1/5/2015); inject nerv blck,othr periph nerv (N/A, 1/5/2015); inject nerv blck,othr periph nerv (N/A, 1/5/2015); inject nerv blck,othr periph nerv (N/A, 1/5/2015); inject nerv blck,othr periph nerv (N/A, 1/5/2015); inject nerv blck,othr periph nerv (N/A, 1/5/2015); inject nerv blck,othr periph nerv (N/A, 1/5/2015); fluoroscopic guidance needle placement (N/A, 1/5/2015); m-sedaj by maynor hernándezg sv 5+ yr (N/A, 1/5/2015); patient withough preoperative order for iv antibiotic surgical site infection prophylaxis.  (N/A, 1/5/2015); patient documented not to have experienced any of the following events (N/A, 1/5/2015); inject rx other periph nerve (Bilateral, 1/30/2015); inject rx other periph nerve (Bilateral, 1/30/2015); inject rx other periph nerve (Bilateral, 1/30/2015); inject rx other periph nerve (Bilateral, 1/30/2015); inject rx other periph nerve (Bilateral, 1/30/2015); inject rx other periph nerve (Bilateral, 1/30/2015); inject rx other periph nerve (Bilateral, 1/30/2015); inject rx other periph nerve (Bilateral, 1/30/2015); fluoroscopic guidance needle placement (Bilateral, 1/30/2015); m-sedaj by  phys perfrmg svc 5+ yr (Bilateral, 1/30/2015); patient withough preoperative order for iv antibiotic surgical site infection prophylaxis. (Bilateral, 1/30/2015); patient documented not to have experienced any of the following events (Bilateral, 1/30/2015); orchiectomy   (Bilateral, 5/26/2015); repair rotator cuff,acute (Bilateral, 7/2007, 10/2006); foot surgery; tonsillectomy; hernia surgery; colonoscopy; colonoscopy (N/A, 1/25/2018); colonoscopy (N/A, 6/26/2019); total knee replacement (Left); other surgical history (10/07/2019); and colonoscopy (N/A, 6/20/2022). CODE STATUS VERIFIED: full code    SUBJECTIVE/ ROS:     REVIEW OF SYSTEMS:  GENERAL HEALTH:feels well otherwise  SKIN: denies any unusual skin lesions or rashes  WOUNDS: wound noted at BLEs, wound nurse to follow with frequent updates as needed    EYES:wears glasses  HENT: denies nasal congestion, sinus pain or sore throat  RESPIRATORY: denies shortness of breath, wheezing or cough  CARDIOVASCULAR: no complaints  GI: no complaints  :no urinary complaints; no dysuria, urgency or frequency   MUSCULOSKELETAL:no complaints of upper or lower body extremities  NEURO:no sensory or motor complaints  PSYCHE: no complaints of depression or anxiety   HEMATOLOGY:no history of anemia, YASIR or B12 deficiency    *This patient will undergo PT/OT/SLP evaluation with treatment as needed. *Skilled nursing care for wound care and /or IV antibiotic administration if indicated. OBJECTIVE:     Vital signs reviewed in [facility] EMR. Hospital and rehab lab results and imaging reviewed as available.     Lab Results   Component Value Date     (H) 09/05/2022 BUN 22 (H) 09/05/2022    BUNCREA 18.8 06/03/2021    CREATSERUM 1.55 (H) 09/05/2022    ANIONGAP 8 09/05/2022    GFR 57 (L) 10/19/2016    GFRNAA 53 (L) 05/19/2022    GFRAA 61 05/19/2022    CA 9.4 09/05/2022    OSMOCALC 291 09/05/2022    ALKPHO 83 09/05/2022    AST 14 (L) 09/05/2022    ALT 26 09/05/2022    BILT 0.5 09/05/2022    TP 7.3 09/05/2022    ALB 3.4 09/05/2022    GLOBULIN 3.9 09/05/2022    AGRATIO 1.7 01/04/2016     09/05/2022    K 4.2 09/05/2022     09/05/2022    CO2 26.0 09/05/2022       Lab Results   Component Value Date    WBC 7.3 09/05/2022    RBC 5.07 09/05/2022    HGB 14.8 09/05/2022    HCT 46.5 09/05/2022    .0 09/05/2022    MCV 91.7 09/05/2022    MCH 29.2 09/05/2022    MCHC 31.8 09/05/2022    RDW 13.9 09/05/2022    NEPRELIM 5.58 09/05/2022    NEPERCENT 76.4 09/05/2022    LYPERCENT 13.5 09/05/2022    MOPERCENT 7.0 09/05/2022    EOPERCENT 2.3 09/05/2022    BAPERCENT 0.3 09/05/2022    NE 5.58 09/05/2022    LYMABS 0.99 (L) 09/05/2022    MOABSO 0.51 09/05/2022    EOABSO 0.17 09/05/2022    BAABSO 0.02 09/05/2022       ASSESSMENT/ PHYSICAL EXAM:     GENERAL HEALTH: well developed, appears well nourished, in no apparent distress; up in WC  LINES, TUBES, DRAINS: PIC line - location RUE  SKIN: no rashes, no suspicious lesions  WOUND: BLE cellulitis; wounds not assessed presently; bandages CDI  EYES: conjunctiva normal, no drainage from eyes; glasses+  HENT: normocephalic; normal nose, no nasal drainage, mucous membranes pink, moist  RESPIRATORY: 95% on RA, no acc muscle use, no ^d wob  ABDOMEN: obese abdomen  : deferred  MUSCULOSKELETAL: BLE weakness r/t recent hospitalization/diagnoses/sequelae; will undergo therapies to rehab and improve strength, endurance and independence with ADLs as able/tolerated  EXTREMITIES/VASCULAR: edema to BLEs; wounds+; hx lymphedema  NEUROLOGIC: intact; no sensorimotor deficit, follows commands  PSYCHIATRIC: alert and oriented x 3; affect appropriate MEDICAL DECISION MAKING and PLAN OF CARE:     Biweekly labs ordered; q Mon/Thurs. Wound MD consult with Dr. Wicho Dill while in SNF; wound nurse to follow. Pt wants to shower; pt may shower, no tub soaking, staff to protect PICC line-change bandage if saturated. Staff to coordinate shower prior to wound care. Tylenol added PRN pain management. Added Senna nightly, Miralax added daily PRN constipation; staff to administer if no bm x 3 days. Add accuchecks BID; notify if >250; add sliding scale with diet changes.     Physical Deconditioning/Weakness; at risk for falling  Fall Precautions  PT/OT/ST to evaluate and treat  [facility] team to establish care plan meeting with patient and POA/family as appropriate  [facility] team/ & discharge planner to assist with establishing safe discharge plan for next level of care     DVT Prophylaxis   Encourage exercise and participation with therapy as much as able    BLE cellulitis; recurrent pseudomonas cellulitis  Bactroban to open ulcerations; wound care as directed  Wound care consult; Dr. Wicho Dill in SNF  Zosyn IVPB q 8 hrs; END DATE 9/14/22  Probiotic po daily    Pain Management  *Added Tylenol 650 mg po q 6 hrs PRN pain  Offer to pre-medicate 30-60 min prior to therapy  Physiatry evaluation with management appreciated    Other history: c diff, Chronic lymphedema    HTN   ASA 81 mg po BID  Hydralazine 50 mg po TID  Furosemide 20 mg po daily PRN; give PRN BLE edema  Carvedilol 12.5 mg po BID; take with meals  Clonidine 0.2 mg po daily   Spironolactone 50 mg po daily     DM 2  LCS diet  Glipizide 10 mg po q am; 5 mg po q pm  Actos 30 mg po daily  Accuchecks BID: before breakfast, before dinner; notify if > 250   Offer bedtime snack; prevent overnight low's    Anxiety  Alprazolam 0.5 mg po TID PRN anxiety    Seasonal allergies  Cetirizine po daily PRN allergic rhinitis  Fluticasone nasal spray PRN    Bowel Management Regimen/Constipation   *Added Senna plus nightly; may hold if loose or frequent stools  *Added Miralax 17 gm po daily PRN; give if no BM x 3 days  Vitamin D, 50,000 units po q Friday; follow-up with PCP post-SNF/repeat level     Vitamins/supplements as r/t deficiencies  [facility] RD to follow while in rehab; supplementation/diet as per [facility] RD  May continue home supplements      *Follow-Up with PCP within 1-2 weeks following [facility] discharge; Dr. Nehal Zuleta, Inder Galicia with specialists as recommended. *Greater than 65 minutes spent w/ patient and family, reviewing medical records, labs, completing medication reconciliation and entering orders to establish plan of care in [facility].     Soni Childs, APRN  09/08/22   11:40 AM

## 2022-09-13 ENCOUNTER — SNF DISCHARGE (OUTPATIENT)
Dept: INTERNAL MEDICINE CLINIC | Age: 72
End: 2022-09-13

## 2022-09-13 VITALS
RESPIRATION RATE: 20 BRPM | DIASTOLIC BLOOD PRESSURE: 92 MMHG | BODY MASS INDEX: 43 KG/M2 | TEMPERATURE: 99 F | HEART RATE: 79 BPM | WEIGHT: 259.13 LBS | SYSTOLIC BLOOD PRESSURE: 155 MMHG

## 2022-09-13 DIAGNOSIS — Z79.2 RECEIVING INTRAVENOUS ANTIBIOTIC TREATMENT AS OUTPATIENT: ICD-10-CM

## 2022-09-13 DIAGNOSIS — Z79.899 MEDICATION MANAGEMENT: ICD-10-CM

## 2022-09-13 DIAGNOSIS — L03.90 RECURRENT CELLULITIS: Primary | ICD-10-CM

## 2022-09-13 DIAGNOSIS — Z91.89 AT RISK FOR CONSTIPATION: ICD-10-CM

## 2022-09-13 DIAGNOSIS — E11.9 CONTROLLED TYPE 2 DIABETES MELLITUS WITHOUT COMPLICATION, WITHOUT LONG-TERM CURRENT USE OF INSULIN (HCC): ICD-10-CM

## 2022-09-13 DIAGNOSIS — F41.1 GENERALIZED ANXIETY DISORDER: ICD-10-CM

## 2022-09-13 PROCEDURE — 99316 NF DSCHRG MGMT 30 MIN+: CPT | Performed by: NURSE PRACTITIONER

## 2022-09-13 PROCEDURE — 1111F DSCHRG MED/CURRENT MED MERGE: CPT | Performed by: NURSE PRACTITIONER

## 2022-10-13 ENCOUNTER — HOSPITAL ENCOUNTER (INPATIENT)
Facility: HOSPITAL | Age: 72
LOS: 4 days | Discharge: HOME | End: 2022-10-17
Attending: HOSPITALIST | Admitting: HOSPITALIST
Payer: MEDICARE

## 2022-10-13 LAB
GLUCOSE BLD-MCNC: 110 MG/DL (ref 70–99)
GLUCOSE BLD-MCNC: 136 MG/DL (ref 70–99)

## 2022-10-13 PROCEDURE — 87176 TISSUE HOMOGENIZATION CULTR: CPT | Performed by: HOSPITALIST

## 2022-10-13 PROCEDURE — 87040 BLOOD CULTURE FOR BACTERIA: CPT | Performed by: HOSPITALIST

## 2022-10-13 PROCEDURE — 87186 SC STD MICRODIL/AGAR DIL: CPT | Performed by: HOSPITALIST

## 2022-10-13 PROCEDURE — 87070 CULTURE OTHR SPECIMN AEROBIC: CPT | Performed by: HOSPITALIST

## 2022-10-13 PROCEDURE — 87077 CULTURE AEROBIC IDENTIFY: CPT | Performed by: HOSPITALIST

## 2022-10-13 PROCEDURE — 82962 GLUCOSE BLOOD TEST: CPT

## 2022-10-13 PROCEDURE — 87205 SMEAR GRAM STAIN: CPT | Performed by: HOSPITALIST

## 2022-10-13 RX ORDER — ASPIRIN 81 MG/1
81 TABLET ORAL 2 TIMES DAILY
Status: DISCONTINUED | OUTPATIENT
Start: 2022-10-13 | End: 2022-10-17

## 2022-10-13 RX ORDER — SPIRONOLACTONE 25 MG/1
50 TABLET ORAL DAILY
Status: DISCONTINUED | OUTPATIENT
Start: 2022-10-14 | End: 2022-10-17

## 2022-10-13 RX ORDER — ACETAMINOPHEN 325 MG/1
650 TABLET ORAL EVERY 6 HOURS PRN
Status: DISCONTINUED | OUTPATIENT
Start: 2022-10-13 | End: 2022-10-14

## 2022-10-13 RX ORDER — HYDRALAZINE HYDROCHLORIDE 50 MG/1
50 TABLET, FILM COATED ORAL 3 TIMES DAILY
Status: DISCONTINUED | OUTPATIENT
Start: 2022-10-13 | End: 2022-10-17

## 2022-10-13 RX ORDER — SENNA AND DOCUSATE SODIUM 50; 8.6 MG/1; MG/1
1 TABLET, FILM COATED ORAL NIGHTLY
Status: DISCONTINUED | OUTPATIENT
Start: 2022-10-13 | End: 2022-10-17

## 2022-10-13 RX ORDER — NICOTINE POLACRILEX 4 MG
15 LOZENGE BUCCAL
Status: DISCONTINUED | OUTPATIENT
Start: 2022-10-13 | End: 2022-10-17

## 2022-10-13 RX ORDER — METOCLOPRAMIDE HYDROCHLORIDE 5 MG/ML
10 INJECTION INTRAMUSCULAR; INTRAVENOUS EVERY 8 HOURS PRN
Status: DISCONTINUED | OUTPATIENT
Start: 2022-10-13 | End: 2022-10-17

## 2022-10-13 RX ORDER — DEXTROSE MONOHYDRATE 25 G/50ML
50 INJECTION, SOLUTION INTRAVENOUS
Status: DISCONTINUED | OUTPATIENT
Start: 2022-10-13 | End: 2022-10-17

## 2022-10-13 RX ORDER — CARVEDILOL 12.5 MG/1
12.5 TABLET ORAL 2 TIMES DAILY WITH MEALS
Status: DISCONTINUED | OUTPATIENT
Start: 2022-10-13 | End: 2022-10-17

## 2022-10-13 RX ORDER — CLONIDINE HYDROCHLORIDE 0.1 MG/1
0.2 TABLET ORAL DAILY
Status: DISCONTINUED | OUTPATIENT
Start: 2022-10-14 | End: 2022-10-17

## 2022-10-13 RX ORDER — HEPARIN SODIUM 5000 [USP'U]/ML
7500 INJECTION, SOLUTION INTRAVENOUS; SUBCUTANEOUS EVERY 8 HOURS SCHEDULED
Status: DISCONTINUED | OUTPATIENT
Start: 2022-10-13 | End: 2022-10-17

## 2022-10-13 RX ORDER — POLYETHYLENE GLYCOL 3350 17 G/17G
17 POWDER, FOR SOLUTION ORAL DAILY PRN
Status: DISCONTINUED | OUTPATIENT
Start: 2022-10-13 | End: 2022-10-17

## 2022-10-13 RX ORDER — ALPRAZOLAM 0.25 MG/1
0.25 TABLET ORAL 3 TIMES DAILY PRN
Status: DISCONTINUED | OUTPATIENT
Start: 2022-10-13 | End: 2022-10-17

## 2022-10-13 RX ORDER — ONDANSETRON 2 MG/ML
4 INJECTION INTRAMUSCULAR; INTRAVENOUS EVERY 6 HOURS PRN
Status: DISCONTINUED | OUTPATIENT
Start: 2022-10-13 | End: 2022-10-17

## 2022-10-13 RX ORDER — FLUTICASONE PROPIONATE 50 MCG
1 SPRAY, SUSPENSION (ML) NASAL DAILY PRN
Status: DISCONTINUED | OUTPATIENT
Start: 2022-10-13 | End: 2022-10-17

## 2022-10-13 RX ORDER — NICOTINE POLACRILEX 4 MG
30 LOZENGE BUCCAL
Status: DISCONTINUED | OUTPATIENT
Start: 2022-10-13 | End: 2022-10-17

## 2022-10-13 RX ORDER — VANCOMYCIN 2 GRAM/500 ML IN 0.9 % SODIUM CHLORIDE INTRAVENOUS
25 ONCE
Status: COMPLETED | OUTPATIENT
Start: 2022-10-13 | End: 2022-10-14

## 2022-10-14 LAB
ANION GAP SERPL CALC-SCNC: 5 MMOL/L (ref 0–18)
BASOPHILS # BLD AUTO: 0.03 X10(3) UL (ref 0–0.2)
BASOPHILS NFR BLD AUTO: 0.3 %
BUN BLD-MCNC: 20 MG/DL (ref 7–18)
CALCIUM BLD-MCNC: 9 MG/DL (ref 8.5–10.1)
CHLORIDE SERPL-SCNC: 103 MMOL/L (ref 98–112)
CO2 SERPL-SCNC: 27 MMOL/L (ref 21–32)
CREAT BLD-MCNC: 1.07 MG/DL
EOSINOPHIL # BLD AUTO: 0.18 X10(3) UL (ref 0–0.7)
EOSINOPHIL NFR BLD AUTO: 2.1 %
ERYTHROCYTE [DISTWIDTH] IN BLOOD BY AUTOMATED COUNT: 14.1 %
GFR SERPLBLD BASED ON 1.73 SQ M-ARVRAT: 74 ML/MIN/1.73M2 (ref 60–?)
GLUCOSE BLD-MCNC: 112 MG/DL (ref 70–99)
GLUCOSE BLD-MCNC: 124 MG/DL (ref 70–99)
GLUCOSE BLD-MCNC: 128 MG/DL (ref 70–99)
GLUCOSE BLD-MCNC: 128 MG/DL (ref 70–99)
GLUCOSE BLD-MCNC: 137 MG/DL (ref 70–99)
HCT VFR BLD AUTO: 44 %
HGB BLD-MCNC: 14.3 G/DL
IMM GRANULOCYTES # BLD AUTO: 0.08 X10(3) UL (ref 0–1)
IMM GRANULOCYTES NFR BLD: 0.9 %
LYMPHOCYTES # BLD AUTO: 1.23 X10(3) UL (ref 1–4)
LYMPHOCYTES NFR BLD AUTO: 14 %
MCH RBC QN AUTO: 29.4 PG (ref 26–34)
MCHC RBC AUTO-ENTMCNC: 32.5 G/DL (ref 31–37)
MCV RBC AUTO: 90.3 FL
MONOCYTES # BLD AUTO: 0.88 X10(3) UL (ref 0.1–1)
MONOCYTES NFR BLD AUTO: 10 %
NEUTROPHILS # BLD AUTO: 6.37 X10 (3) UL (ref 1.5–7.7)
NEUTROPHILS # BLD AUTO: 6.37 X10(3) UL (ref 1.5–7.7)
NEUTROPHILS NFR BLD AUTO: 72.7 %
OSMOLALITY SERPL CALC.SUM OF ELEC: 284 MOSM/KG (ref 275–295)
PLATELET # BLD AUTO: 270 10(3)UL (ref 150–450)
POTASSIUM SERPL-SCNC: 4.4 MMOL/L (ref 3.5–5.1)
RBC # BLD AUTO: 4.87 X10(6)UL
SODIUM SERPL-SCNC: 135 MMOL/L (ref 136–145)
VANCOMYCIN PEAK SERPL-MCNC: 24 UG/ML (ref 30–50)
VANCOMYCIN TROUGH SERPL-MCNC: 10.7 UG/ML (ref 10–20)
WBC # BLD AUTO: 8.8 X10(3) UL (ref 4–11)

## 2022-10-14 PROCEDURE — 80048 BASIC METABOLIC PNL TOTAL CA: CPT | Performed by: HOSPITALIST

## 2022-10-14 PROCEDURE — 99213 OFFICE O/P EST LOW 20 MIN: CPT

## 2022-10-14 PROCEDURE — 85025 COMPLETE CBC W/AUTO DIFF WBC: CPT | Performed by: HOSPITALIST

## 2022-10-14 PROCEDURE — 80202 ASSAY OF VANCOMYCIN: CPT | Performed by: HOSPITALIST

## 2022-10-14 PROCEDURE — 82962 GLUCOSE BLOOD TEST: CPT

## 2022-10-14 RX ORDER — FUROSEMIDE 20 MG/1
20 TABLET ORAL DAILY
Status: DISCONTINUED | OUTPATIENT
Start: 2022-10-14 | End: 2022-10-17

## 2022-10-14 RX ORDER — TRAMADOL HYDROCHLORIDE 50 MG/1
50 TABLET ORAL EVERY 6 HOURS PRN
Status: DISCONTINUED | OUTPATIENT
Start: 2022-10-14 | End: 2022-10-17

## 2022-10-14 RX ORDER — VANCOMYCIN 2 GRAM/500 ML IN 0.9 % SODIUM CHLORIDE INTRAVENOUS
2000 EVERY 12 HOURS
Status: DISCONTINUED | OUTPATIENT
Start: 2022-10-14 | End: 2022-10-14

## 2022-10-14 RX ORDER — DIPHENHYDRAMINE HCL 25 MG
25 CAPSULE ORAL EVERY 6 HOURS PRN
Status: DISCONTINUED | OUTPATIENT
Start: 2022-10-14 | End: 2022-10-17

## 2022-10-14 RX ORDER — VANCOMYCIN/0.9 % SOD CHLORIDE 1.75 G/5
15 PLASTIC BAG, INJECTION (ML) INTRAVENOUS EVERY 12 HOURS
Status: DISCONTINUED | OUTPATIENT
Start: 2022-10-14 | End: 2022-10-14

## 2022-10-14 NOTE — PLAN OF CARE
NURSING ADMISSION NOTE      Patient admitted via wheelchair  Oriented to room 511. Safety precautions initiated. Bed in low position. Call light in reach. Patient admitted for recurrent cellulitis. Admission navigator completed. ID consulted. Medications reconciled. Ax04. Telemetry- sinus rhythm. Intermittent pulse ox. Room air. Afebrile. Verbalized moderate pain on lower extremities at times. Benadryl given for itching, and tingling, no visible rashes. Continent. 1800 ADA diet. Accu checks. Saline lock. Lower bilateral extremities cellulitis. Redness, purulent drainage. Tissue cx collected. Wound consult in place. Ambulatory. POC: IV vancomycin -1 dose, IV cefepime, heparin.        Problem: SKIN/TISSUE INTEGRITY - ADULT  Goal: Skin integrity remains intact  Description: INTERVENTIONS  - Assess and document risk factors for pressure ulcer development  - Assess and document skin integrity  - Monitor for areas of redness and/or skin breakdown  - Initiate interventions, skin care algorithm/standards of care as needed  10/14/2022 0319 by Fabian Berg RN  Outcome: Progressing  10/14/2022 0243 by Fabian Berg RN  Outcome: Progressing  Goal: Incision(s), wounds(s) or drain site(s) healing without S/S of infection  Description: INTERVENTIONS:  - Assess and document risk factors for pressure ulcer development  - Assess and document skin integrity  - Assess and document dressing/incision, wound bed, drain sites and surrounding tissue  - Implement wound care per orders  - Initiate isolation precautions as appropriate  - Initiate Pressure Ulcer prevention bundle as indicated  10/14/2022 0319 by Fabian Berg RN  Outcome: Progressing  10/14/2022 [address] by Fabian Berg RN  Outcome: Progressing  Goal: Oral mucous membranes remain intact  Description: INTERVENTIONS  - Assess oral mucosa and hygiene practices  - Implement preventative oral hygiene regimen  - Implement oral medicated treatments as ordered  10/14/2022 0319 by Ramsey Mane RN  Outcome: Progressing  10/14/2022 0243 by Ramsey Mane RN  Outcome: Progressing     Problem: Impaired Functional Mobility  Goal: Achieve highest/safest level of mobility/gait  Description: Interventions:  - Assess patient's functional ability and stability  - Promote increasing activity/tolerance for mobility and gait  - Educate and engage patient/family in tolerated activity level and precautions    10/14/2022 0319 by Ramsey Mane RN  Outcome: Progressing  10/14/2022 0243 by Ramsey Mane RN  Outcome: Progressing     Problem: Patient/Family Goals  Goal: Patient/Family Long Term Goal  Description: Patient's Long Term Goal: Discharge to home    Interventions:  - Follow plan of care  - See additional Care Plan goals for specific interventions  10/14/2022 0319 by Ramsey Mane RN  Outcome: Progressing  10/14/2022 [address] by Ramsey Mane RN  Outcome: Progressing  Goal: Patient/Family Short Term Goal  Description: Patient's Short Term Goal:   10/14 noc: IV ABT    Interventions:   - ID consult  - IV ABT    - See additional Care Plan goals for specific interventions  10/14/2022 0319 by Ramsey Mane RN  Outcome: Progressing  10/14/2022 [address] by Ramsey Mane RN  Outcome: Progressing     Problem: Patient/Family Goals  Goal: Patient/Family Long Term Goal  Description: Patient's Long Term Goal: Discharge to home    Interventions:  - Follow plan of care  - See additional Care Plan goals for specific interventions  10/14/2022 0319 by Ramsey Mane RN  Outcome: Progressing  10/14/2022 [address] by Ramsey Mane RN  Outcome: Progressing  Goal: Patient/Family Short Term Goal  Description: Patient's Short Term Goal:   10/14 noc: IV ABT    Interventions:   - ID consult  - IV ABT    - See additional Care Plan goals for specific interventions  10/14/2022 0319 by Ramsey Mane RN  Outcome: Progressing  10/14/2022 0243 by Esther Nayak Vic Peterson RN  Outcome: Progressing

## 2022-10-14 NOTE — CONSULTS
[facility] Dosing Service    Initial Pharmacokinetic Consult for Vancomycin AUC Dosing    Yessica Jade is a 67year old patient who is being treated for cellulitis. Pharmacy has been asked to dose vancomycin by Dr. Britney Sanders, who spoke with Infectious Disease service. Cefepime is also ordered    Weights:  Ideal body weight: 61.5 kg (135 lb 9.3 oz)  Adjusted ideal body weight: 85.1 kg (187 lb 9.8 oz)  Actual weight:  120.5 kg (265 lb 10.5 oz)    Labs:      10/13/22 SCr = 0.95; CrCl, est ~61 mL/min     Based on the above:    1. This patient will receive a loading dose of Vancomycin  2000 mg IVPB (25mg/kg, capped at 2000 mg) x 1 dose. 2. Vancomycin peak and trough will be obtained prior to the next dose, in order to calculate AUC24. Goal AUC24 is 400-600 mg-h/L.    3. Pharmacy will order SCr as clinically indicated while on vancomycin to assess renal function. 4. Pharmacy will follow and monitor renal function, toxicity and efficacy. We appreciate the opportunity to assist in the care of this patient.     Teri Henriquez, Vernell[facility]  10/13/2022  9:18 PM  [address] [place] [address] Extension: [phone]

## 2022-10-15 LAB
ANION GAP SERPL CALC-SCNC: 7 MMOL/L (ref 0–18)
BASOPHILS # BLD AUTO: 0.03 X10(3) UL (ref 0–0.2)
BASOPHILS NFR BLD AUTO: 0.4 %
BUN BLD-MCNC: 20 MG/DL (ref 7–18)
CALCIUM BLD-MCNC: 9.3 MG/DL (ref 8.5–10.1)
CHLORIDE SERPL-SCNC: 104 MMOL/L (ref 98–112)
CO2 SERPL-SCNC: 24 MMOL/L (ref 21–32)
CREAT BLD-MCNC: 1.16 MG/DL
EOSINOPHIL # BLD AUTO: 0.22 X10(3) UL (ref 0–0.7)
EOSINOPHIL NFR BLD AUTO: 2.7 %
ERYTHROCYTE [DISTWIDTH] IN BLOOD BY AUTOMATED COUNT: 13.9 %
GFR SERPLBLD BASED ON 1.73 SQ M-ARVRAT: 67 ML/MIN/1.73M2 (ref 60–?)
GLUCOSE BLD-MCNC: 127 MG/DL (ref 70–99)
GLUCOSE BLD-MCNC: 139 MG/DL (ref 70–99)
GLUCOSE BLD-MCNC: 142 MG/DL (ref 70–99)
GLUCOSE BLD-MCNC: 176 MG/DL (ref 70–99)
GLUCOSE BLD-MCNC: 180 MG/DL (ref 70–99)
GLUCOSE BLD-MCNC: 84 MG/DL (ref 70–99)
HCT VFR BLD AUTO: 45.5 %
HGB BLD-MCNC: 14.7 G/DL
IMM GRANULOCYTES # BLD AUTO: 0.08 X10(3) UL (ref 0–1)
IMM GRANULOCYTES NFR BLD: 1 %
LYMPHOCYTES # BLD AUTO: 0.93 X10(3) UL (ref 1–4)
LYMPHOCYTES NFR BLD AUTO: 11.4 %
MCH RBC QN AUTO: 29.2 PG (ref 26–34)
MCHC RBC AUTO-ENTMCNC: 32.3 G/DL (ref 31–37)
MCV RBC AUTO: 90.3 FL
MONOCYTES # BLD AUTO: 0.73 X10(3) UL (ref 0.1–1)
MONOCYTES NFR BLD AUTO: 8.9 %
NEUTROPHILS # BLD AUTO: 6.17 X10 (3) UL (ref 1.5–7.7)
NEUTROPHILS # BLD AUTO: 6.17 X10(3) UL (ref 1.5–7.7)
NEUTROPHILS NFR BLD AUTO: 75.6 %
OSMOLALITY SERPL CALC.SUM OF ELEC: 284 MOSM/KG (ref 275–295)
PLATELET # BLD AUTO: 283 10(3)UL (ref 150–450)
POTASSIUM SERPL-SCNC: 4.5 MMOL/L (ref 3.5–5.1)
RBC # BLD AUTO: 5.04 X10(6)UL
SODIUM SERPL-SCNC: 135 MMOL/L (ref 136–145)
WBC # BLD AUTO: 8.2 X10(3) UL (ref 4–11)

## 2022-10-15 PROCEDURE — 80048 BASIC METABOLIC PNL TOTAL CA: CPT | Performed by: HOSPITALIST

## 2022-10-15 PROCEDURE — 82962 GLUCOSE BLOOD TEST: CPT

## 2022-10-15 PROCEDURE — 85025 COMPLETE CBC W/AUTO DIFF WBC: CPT | Performed by: HOSPITALIST

## 2022-10-15 RX ORDER — VANCOMYCIN 2 GRAM/500 ML IN 0.9 % SODIUM CHLORIDE INTRAVENOUS
2000 EVERY 12 HOURS
Status: DISCONTINUED | OUTPATIENT
Start: 2022-10-15 | End: 2022-10-17

## 2022-10-15 NOTE — PLAN OF CARE
Problem: Cellulitis  Assessment: Alert & oriented x4. Vital signs WNL, afebrile. NSRon tele. Verbalizes chronic burning pain in BLE. Good appetite. BS WNL. Voids to bathroom. No c/o n/v/d, weakness, or dizziness. Up ad lennie. BLE red, painful, warm to touch, with lymphedema. Minimal drainage from RLE. Wound cx showing pseudomonas. Intervention: ID on consult. On IV cefepime and vanco. QID accuchecks and insulin with BS management. Fall precautions in place. Prn meds for pain control. Wound care and dressing change completed. Education:  Safety. Plan of care. IV abx. Response: Patient verbalized understanding.     Problem: SKIN/TISSUE INTEGRITY - ADULT  Goal: Skin integrity remains intact  Description: INTERVENTIONS  - Assess and document risk factors for pressure ulcer development  - Assess and document skin integrity  - Monitor for areas of redness and/or skin breakdown  - Initiate interventions, skin care algorithm/standards of care as needed  Outcome: Progressing  Goal: Incision(s), wounds(s) or drain site(s) healing without S/S of infection  Description: INTERVENTIONS:  - Assess and document risk factors for pressure ulcer development  - Assess and document skin integrity  - Assess and document dressing/incision, wound bed, drain sites and surrounding tissue  - Implement wound care per orders  - Initiate isolation precautions as appropriate  - Initiate Pressure Ulcer prevention bundle as indicated  Outcome: Progressing  Goal: Oral mucous membranes remain intact  Description: INTERVENTIONS  - Assess oral mucosa and hygiene practices  - Implement preventative oral hygiene regimen  - Implement oral medicated treatments as ordered  Outcome: Progressing     Problem: Impaired Functional Mobility  Goal: Achieve highest/safest level of mobility/gait  Description: Interventions:  - Assess patient's functional ability and stability  - Promote increasing activity/tolerance for mobility and gait  - Educate and engage patient/family in tolerated activity level and precautions    Outcome: Progressing     Problem: Patient/Family Goals  Goal: Patient/Family Long Term Goal  Description: Patient's Long Term Goal: Discharge to home    Interventions:  - Follow plan of care  - See additional Care Plan goals for specific interventions  Outcome: Progressing  Goal: Patient/Family Short Term Goal  Description: Patient's Short Term Goal:   10/14 noc: IV ABT  10/14noc: wound care consulted and dressings completed  10/15 am: manage pain    Interventions:   - ID consult  - IV ABT    - See additional Care Plan goals for specific interventions  Outcome: Progressing

## 2022-10-15 NOTE — PLAN OF CARE
Neuro- Alert/Orientedx4    Resp- with ease on room air     Cardio- NSR    GI/- continent x2, using the urinal during the night    Pain/Fever- none     Activity- up ad lennie    Misc- IV vanco and cefipime   Plan of Care/Discussion- Reviewed with the patient.         Problem: Patient/Family Goals  Goal: Patient/Family Long Term Goal  Description: Patient's Long Term Goal: Discharge to home    Interventions:  - Follow plan of care  - See additional Care Plan goals for specific interventions  Outcome: Progressing  Goal: Patient/Family Short Term Goal  Description: Patient's Short Term Goal:   10/14 noc: IV ABT  10/14noc: wound care consulted and dressings completed    Interventions:   - ID consult  - IV ABT    - See additional Care Plan goals for specific interventions  Outcome: Progressing

## 2022-10-16 LAB
ANION GAP SERPL CALC-SCNC: 6 MMOL/L (ref 0–18)
BASOPHILS # BLD AUTO: 0.04 X10(3) UL (ref 0–0.2)
BASOPHILS NFR BLD AUTO: 0.4 %
BUN BLD-MCNC: 18 MG/DL (ref 7–18)
CALCIUM BLD-MCNC: 8.8 MG/DL (ref 8.5–10.1)
CHLORIDE SERPL-SCNC: 105 MMOL/L (ref 98–112)
CO2 SERPL-SCNC: 24 MMOL/L (ref 21–32)
CREAT BLD-MCNC: 1.1 MG/DL
EOSINOPHIL # BLD AUTO: 0.27 X10(3) UL (ref 0–0.7)
EOSINOPHIL NFR BLD AUTO: 3 %
ERYTHROCYTE [DISTWIDTH] IN BLOOD BY AUTOMATED COUNT: 14 %
GFR SERPLBLD BASED ON 1.73 SQ M-ARVRAT: 71 ML/MIN/1.73M2 (ref 60–?)
GLUCOSE BLD-MCNC: 123 MG/DL (ref 70–99)
GLUCOSE BLD-MCNC: 141 MG/DL (ref 70–99)
GLUCOSE BLD-MCNC: 176 MG/DL (ref 70–99)
GLUCOSE BLD-MCNC: 197 MG/DL (ref 70–99)
GLUCOSE BLD-MCNC: 98 MG/DL (ref 70–99)
HCT VFR BLD AUTO: 45.6 %
HGB BLD-MCNC: 14.7 G/DL
IMM GRANULOCYTES # BLD AUTO: 0.1 X10(3) UL (ref 0–1)
IMM GRANULOCYTES NFR BLD: 1.1 %
LYMPHOCYTES # BLD AUTO: 1.18 X10(3) UL (ref 1–4)
LYMPHOCYTES NFR BLD AUTO: 13 %
MCH RBC QN AUTO: 29.8 PG (ref 26–34)
MCHC RBC AUTO-ENTMCNC: 32.2 G/DL (ref 31–37)
MCV RBC AUTO: 92.3 FL
MONOCYTES # BLD AUTO: 0.67 X10(3) UL (ref 0.1–1)
MONOCYTES NFR BLD AUTO: 7.4 %
NEUTROPHILS # BLD AUTO: 6.85 X10 (3) UL (ref 1.5–7.7)
NEUTROPHILS # BLD AUTO: 6.85 X10(3) UL (ref 1.5–7.7)
NEUTROPHILS NFR BLD AUTO: 75.1 %
OSMOLALITY SERPL CALC.SUM OF ELEC: 287 MOSM/KG (ref 275–295)
PLATELET # BLD AUTO: 300 10(3)UL (ref 150–450)
POTASSIUM SERPL-SCNC: 4.7 MMOL/L (ref 3.5–5.1)
RBC # BLD AUTO: 4.94 X10(6)UL
SODIUM SERPL-SCNC: 135 MMOL/L (ref 136–145)
WBC # BLD AUTO: 9.1 X10(3) UL (ref 4–11)

## 2022-10-16 PROCEDURE — 80202 ASSAY OF VANCOMYCIN: CPT | Performed by: INTERNAL MEDICINE

## 2022-10-16 PROCEDURE — 80048 BASIC METABOLIC PNL TOTAL CA: CPT | Performed by: HOSPITALIST

## 2022-10-16 PROCEDURE — 82962 GLUCOSE BLOOD TEST: CPT

## 2022-10-16 PROCEDURE — 85025 COMPLETE CBC W/AUTO DIFF WBC: CPT | Performed by: HOSPITALIST

## 2022-10-16 RX ORDER — OXYCODONE HYDROCHLORIDE 5 MG/1
5 TABLET ORAL EVERY 6 HOURS PRN
Status: DISCONTINUED | OUTPATIENT
Start: 2022-10-16 | End: 2022-10-17

## 2022-10-16 NOTE — PLAN OF CARE
Received pt A&Ox4. Room air. No tele ordered, Vitals Q8H and stable. Heparin ordered however pt refusing. Tolerating diet, accuchecks QID. Voids, up self. New PIV placed. IV abx for cellulitis. Dressings to BLE clean, dry and intact. Updated on plan of care, will continue to monitor.      Problem: SKIN/TISSUE INTEGRITY - ADULT  Goal: Skin integrity remains intact  Description: INTERVENTIONS  - Assess and document risk factors for pressure ulcer development  - Assess and document skin integrity  - Monitor for areas of redness and/or skin breakdown  - Initiate interventions, skin care algorithm/standards of care as needed  Outcome: Progressing  Goal: Incision(s), wounds(s) or drain site(s) healing without S/S of infection  Description: INTERVENTIONS:  - Assess and document risk factors for pressure ulcer development  - Assess and document skin integrity  - Assess and document dressing/incision, wound bed, drain sites and surrounding tissue  - Implement wound care per orders  - Initiate isolation precautions as appropriate  - Initiate Pressure Ulcer prevention bundle as indicated  Outcome: Progressing  Goal: Oral mucous membranes remain intact  Description: INTERVENTIONS  - Assess oral mucosa and hygiene practices  - Implement preventative oral hygiene regimen  - Implement oral medicated treatments as ordered  Outcome: Progressing     Problem: Impaired Functional Mobility  Goal: Achieve highest/safest level of mobility/gait  Description: Interventions:  - Assess patient's functional ability and stability  - Promote increasing activity/tolerance for mobility and gait  - Educate and engage patient/family in tolerated activity level and precautions  - Elevate right lower extremity  - Elevate left lower extremity  Outcome: Progressing     Problem: Patient/Family Goals  Goal: Patient/Family Long Term Goal  Description: Patient's Long Term Goal: Discharge to home    Interventions:  - Follow plan of care  - See additional Care Plan goals for specific interventions  Outcome: Progressing  Goal: Patient/Family Short Term Goal  Description: Patient's Short Term Goal:   10/14 noc: IV ABT  10/14noc: wound care consulted and dressings completed  10/15 am: manage pain  10/15 NOC: IV abx, rest  Interventions:   - ID consult  - IV ABT    - See additional Care Plan goals for specific interventions  Outcome: Progressing

## 2022-10-16 NOTE — PLAN OF CARE
Problem: Cellulitis  Assessment: Alert & oriented x4. Vital signs WNL, afebrile. NSRon tele. Verbalizes chronic burning pain in BLE. Good appetite. BS WNL. Voids to bathroom. No c/o n/v/d, weakness, or dizziness. Up ad lennie. BLE red, painful, warm to touch, with lymphedema. Pain not controlled with tramadol or oxy IR. Redness improving, scant drainage from cellulitis on BLE. Intervention: ID on consult. On IV cefepime and vanco. QID accuchecks and insulin with BS management. Fall precautions in place. Prn meds for pain control. Wound care and dressing change completed. Education:  Safety. Plan of care. IV abx. Response: Patient verbalized understanding.     Problem: SKIN/TISSUE INTEGRITY - ADULT  Goal: Skin integrity remains intact  Description: INTERVENTIONS  - Assess and document risk factors for pressure ulcer development  - Assess and document skin integrity  - Monitor for areas of redness and/or skin breakdown  - Initiate interventions, skin care algorithm/standards of care as needed  Outcome: Progressing  Goal: Incision(s), wounds(s) or drain site(s) healing without S/S of infection  Description: INTERVENTIONS:  - Assess and document risk factors for pressure ulcer development  - Assess and document skin integrity  - Assess and document dressing/incision, wound bed, drain sites and surrounding tissue  - Implement wound care per orders  - Initiate isolation precautions as appropriate  - Initiate Pressure Ulcer prevention bundle as indicated  Outcome: Progressing  Goal: Oral mucous membranes remain intact  Description: INTERVENTIONS  - Assess oral mucosa and hygiene practices  - Implement preventative oral hygiene regimen  - Implement oral medicated treatments as ordered  Outcome: Progressing     Problem: Impaired Functional Mobility  Goal: Achieve highest/safest level of mobility/gait  Description: Interventions:  - Assess patient's functional ability and stability  - Promote increasing activity/tolerance for mobility and gait  - Educate and engage patient/family in tolerated activity level and precautions    Outcome: Progressing     Problem: Patient/Family Goals  Goal: Patient/Family Long Term Goal  Description: Patient's Long Term Goal: Discharge to home    Interventions:  - Follow plan of care  - See additional Care Plan goals for specific interventions  Outcome: Progressing  Goal: Patient/Family Short Term Goal  Description: Patient's Short Term Goal:   10/14 noc: IV ABT  10/14noc: wound care consulted and dressings completed  10/15 am: manage pain  10/15 NOC: IV abx, rest  10/16 am: shower, manage pain    Interventions:   - ID consult  - IV ABT    - See additional Care Plan goals for specific interventions  Outcome: Progressing

## 2022-10-17 VITALS
HEART RATE: 67 BPM | TEMPERATURE: 98 F | DIASTOLIC BLOOD PRESSURE: 66 MMHG | WEIGHT: 265.63 LBS | SYSTOLIC BLOOD PRESSURE: 113 MMHG | RESPIRATION RATE: 18 BRPM | BODY MASS INDEX: 44 KG/M2 | OXYGEN SATURATION: 96 %

## 2022-10-17 LAB
ANION GAP SERPL CALC-SCNC: 7 MMOL/L (ref 0–18)
BASOPHILS # BLD AUTO: 0.06 X10(3) UL (ref 0–0.2)
BASOPHILS NFR BLD AUTO: 0.7 %
BUN BLD-MCNC: 17 MG/DL (ref 7–18)
CALCIUM BLD-MCNC: 9.4 MG/DL (ref 8.5–10.1)
CHLORIDE SERPL-SCNC: 105 MMOL/L (ref 98–112)
CO2 SERPL-SCNC: 23 MMOL/L (ref 21–32)
CREAT BLD-MCNC: 1.1 MG/DL
EOSINOPHIL # BLD AUTO: 0.21 X10(3) UL (ref 0–0.7)
EOSINOPHIL NFR BLD AUTO: 2.6 %
ERYTHROCYTE [DISTWIDTH] IN BLOOD BY AUTOMATED COUNT: 14.2 %
GFR SERPLBLD BASED ON 1.73 SQ M-ARVRAT: 71 ML/MIN/1.73M2 (ref 60–?)
GLUCOSE BLD-MCNC: 124 MG/DL (ref 70–99)
GLUCOSE BLD-MCNC: 131 MG/DL (ref 70–99)
GLUCOSE BLD-MCNC: 182 MG/DL (ref 70–99)
HCT VFR BLD AUTO: 46.8 %
HGB BLD-MCNC: 15 G/DL
IMM GRANULOCYTES # BLD AUTO: 0.08 X10(3) UL (ref 0–1)
IMM GRANULOCYTES NFR BLD: 1 %
LYMPHOCYTES # BLD AUTO: 1.16 X10(3) UL (ref 1–4)
LYMPHOCYTES NFR BLD AUTO: 14.2 %
MCH RBC QN AUTO: 29.3 PG (ref 26–34)
MCHC RBC AUTO-ENTMCNC: 32.1 G/DL (ref 31–37)
MCV RBC AUTO: 91.4 FL
MONOCYTES # BLD AUTO: 0.56 X10(3) UL (ref 0.1–1)
MONOCYTES NFR BLD AUTO: 6.9 %
NEUTROPHILS # BLD AUTO: 6.1 X10 (3) UL (ref 1.5–7.7)
NEUTROPHILS # BLD AUTO: 6.1 X10(3) UL (ref 1.5–7.7)
NEUTROPHILS NFR BLD AUTO: 74.6 %
OSMOLALITY SERPL CALC.SUM OF ELEC: 286 MOSM/KG (ref 275–295)
PLATELET # BLD AUTO: 277 10(3)UL (ref 150–450)
POTASSIUM SERPL-SCNC: 4.4 MMOL/L (ref 3.5–5.1)
RBC # BLD AUTO: 5.12 X10(6)UL
SODIUM SERPL-SCNC: 135 MMOL/L (ref 136–145)
VANCOMYCIN TROUGH SERPL-MCNC: 22.6 UG/ML (ref 10–20)
WBC # BLD AUTO: 8.2 X10(3) UL (ref 4–11)

## 2022-10-17 PROCEDURE — 82962 GLUCOSE BLOOD TEST: CPT

## 2022-10-17 PROCEDURE — 85025 COMPLETE CBC W/AUTO DIFF WBC: CPT | Performed by: HOSPITALIST

## 2022-10-17 PROCEDURE — 05HF33Z INSERTION OF INFUSION DEVICE INTO LEFT CEPHALIC VEIN, PERCUTANEOUS APPROACH: ICD-10-PCS | Performed by: INTERNAL MEDICINE

## 2022-10-17 PROCEDURE — 36410 VNPNXR 3YR/> PHY/QHP DX/THER: CPT

## 2022-10-17 PROCEDURE — 80048 BASIC METABOLIC PNL TOTAL CA: CPT | Performed by: HOSPITALIST

## 2022-10-17 RX ORDER — VANCOMYCIN 2 GRAM/500 ML IN 0.9 % SODIUM CHLORIDE INTRAVENOUS
2000
Status: DISCONTINUED | OUTPATIENT
Start: 2022-10-17 | End: 2022-10-17

## 2022-10-17 RX ORDER — CEFEPIME 1 G/50ML
2 INJECTION, SOLUTION INTRAVENOUS EVERY 8 HOURS
Qty: 4800 ML | Refills: 0 | Status: SHIPPED | OUTPATIENT
Start: 2022-10-17 | End: 2022-11-02

## 2022-10-17 RX ORDER — ACETAMINOPHEN 325 MG/1
650 TABLET ORAL EVERY 6 HOURS PRN
Status: DISCONTINUED | OUTPATIENT
Start: 2022-10-17 | End: 2022-10-17

## 2022-10-17 RX ORDER — DIPHENHYDRAMINE HYDROCHLORIDE 50 MG/ML
12.5 INJECTION INTRAMUSCULAR; INTRAVENOUS EVERY 4 HOURS PRN
Status: DISCONTINUED | OUTPATIENT
Start: 2022-10-17 | End: 2022-10-17

## 2022-10-17 RX ORDER — DIPHENHYDRAMINE HCL 25 MG
25 CAPSULE ORAL EVERY 4 HOURS PRN
Status: DISCONTINUED | OUTPATIENT
Start: 2022-10-17 | End: 2022-10-17

## 2022-10-17 NOTE — PROGRESS NOTES
NURSING DISCHARGE NOTE    Discharged Rehab facility via Ambulatory. Accompanied by Family member  Belongings Taken by patient/family. PIV and tele removed. Midline wrapped. Discharge navigator completed. Discharge education done at bedside. Report called to Carie.  Pt's wife to drive pt to [facility]

## 2022-10-17 NOTE — CM/SW NOTE
Pt is a 68 yo male admitted for recurrent cellulitis. Pt needs IV ABX at dc for 3 weeks. Pt has needed these in the past but they were too expensive for pt to get them at home. Pt went to [facility] about a month ago for IV ABX and wound care. He is willing to go back to [facility] for this again. Referral sent to [facility] SNF via [facility] - waiting for responses. PASRR was completed in Sept and still good. SW to f/u to see if [facility] can accept pt again for IV ABX and wound care.        10/17/22 1400   CM/SW Referral Data   Referral Source Physician   Reason for Referral Discharge planning   Informant Patient   Patient [address]   Patient lives with Spouse/Significant other   Discharge Needs   Anticipated D/C needs Subacute rehab   Services Requested   PASRR Level 1 Submitted No - Current within 90 days   Choice of Post-Acute Provider   Informed patient of right to choose their preferred provider Yes

## 2022-10-17 NOTE — PLAN OF CARE
Pt is A&Ox4. RA, O2 >92% throughout shift. No tele. QID accuchecks. Pt reports pain but declines any PRN medication. Up at lennie, urinal. BLE red, painful, warm to touch, with lymphedema. Minimal drainage from RLE. Dressings changed. Safety precautions in place, all questions answered, no further needs at this time.      Problem: SKIN/TISSUE INTEGRITY - ADULT  Goal: Skin integrity remains intact  Description: INTERVENTIONS  - Assess and document risk factors for pressure ulcer development  - Assess and document skin integrity  - Monitor for areas of redness and/or skin breakdown  - Initiate interventions, skin care algorithm/standards of care as needed  Outcome: Progressing  Goal: Incision(s), wounds(s) or drain site(s) healing without S/S of infection  Description: INTERVENTIONS:  - Assess and document risk factors for pressure ulcer development  - Assess and document skin integrity  - Assess and document dressing/incision, wound bed, drain sites and surrounding tissue  - Implement wound care per orders  - Initiate isolation precautions as appropriate  - Initiate Pressure Ulcer prevention bundle as indicated  Outcome: Progressing  Goal: Oral mucous membranes remain intact  Description: INTERVENTIONS  - Assess oral mucosa and hygiene practices  - Implement preventative oral hygiene regimen  - Implement oral medicated treatments as ordered  Outcome: Progressing     Problem: Impaired Functional Mobility  Goal: Achieve highest/safest level of mobility/gait  Description: Interventions:  - Assess patient's functional ability and stability  - Promote increasing activity/tolerance for mobility and gait  - Educate and engage patient/family in tolerated activity level and precautions  Outcome: Progressing     Problem: Patient/Family Goals  Goal: Patient/Family Long Term Goal  Description: Patient's Long Term Goal: Discharge to home    Interventions:  - Follow plan of care  - See additional Care Plan goals for specific interventions  Outcome: Progressing  Goal: Patient/Family Short Term Goal  Description: Patient's Short Term Goal:   10/14 noc: IV ABT  10/14noc: wound care consulted and dressings completed  10/15 am: manage pain  10/15 NOC: IV abx, rest  10/16 am: shower, manage pain  10/16 noc: sleep  10/17: tolerate antibiotics    Interventions:   - ID consult  - IV ABT    - See additional Care Plan goals for specific interventions  Outcome: Progressing

## 2022-10-17 NOTE — CM/SW NOTE
Pt is ready for dc today. [facility] SNF can accept pt for his IV ABX. They can take him today. RN to call report to [phone].   Wife will  pt and transport him to [facility] today at 5:00pm.

## 2022-10-17 NOTE — PLAN OF CARE
Alert x4. Room air. Accuchecks Qid. Voids. Up ad lennie. C/o chronic BLE pain at this time. Refusing PRN Oxycodone, complains of feeling \"too groggy. \" Refusing any pain meds at this time. Denies any c/o n/v/d. Updated on POC, verbalized understanding, no questions at this time. Call light within reach. Per pharmacy, due to trough levels, will hold Vanco dose after tonight's (10/16) dose.      Problem: SKIN/TISSUE INTEGRITY - ADULT  Goal: Skin integrity remains intact  Description: INTERVENTIONS  - Assess and document risk factors for pressure ulcer development  - Assess and document skin integrity  - Monitor for areas of redness and/or skin breakdown  - Initiate interventions, skin care algorithm/standards of care as needed  10/16/2022 2336 by Bonita Santos RN  Outcome: Progressing  10/16/2022 2335 by Bonita Santos RN  Outcome: Progressing  Goal: Incision(s), wounds(s) or drain site(s) healing without S/S of infection  Description: INTERVENTIONS:  - Assess and document risk factors for pressure ulcer development  - Assess and document skin integrity  - Assess and document dressing/incision, wound bed, drain sites and surrounding tissue  - Implement wound care per orders  - Initiate isolation precautions as appropriate  - Initiate Pressure Ulcer prevention bundle as indicated  10/16/2022 2336 by Bonita Santos RN  Outcome: Progressing  10/16/2022 2335 by Bonita Santos RN  Outcome: Progressing  Goal: Oral mucous membranes remain intact  Description: INTERVENTIONS  - Assess oral mucosa and hygiene practices  - Implement preventative oral hygiene regimen  - Implement oral medicated treatments as ordered  10/16/2022 2336 by Bonita Santos RN  Outcome: Progressing  10/16/2022 2335 by Bonita Santos RN  Outcome: Progressing     Problem: Impaired Functional Mobility  Goal: Achieve highest/safest level of mobility/gait  Description: Interventions:  - Assess patient's functional ability and stability  - Promote increasing activity/tolerance for mobility and gait  - Educate and engage patient/family in tolerated activity level and precautions  - Recommend patient transfer to bedside chair toward strongest side  10/16/2022 2336 by Svetlana Hung RN  Outcome: Progressing  10/16/2022 2335 by Svetlana Hung RN  Outcome: Progressing     Problem: Patient/Family Goals  Goal: Patient/Family Long Term Goal  Description: Patient's Long Term Goal: Discharge to home    Interventions:  - Follow plan of care  - See additional Care Plan goals for specific interventions  10/16/2022 2336 by Svetlana Hung RN  Outcome: Progressing  10/16/2022 2335 by Svetlana Hung RN  Outcome: Progressing  Goal: Patient/Family Short Term Goal  Description: Patient's Short Term Goal:   10/14 noc: IV ABT  10/14noc: wound care consulted and dressings completed  10/15 am: manage pain  10/15 [id number] [place]: IV abx, rest  10/16 am: shower, manage pain  10/16 noc: sleep    Interventions:   - ID consult  - IV ABT    - See additional Care Plan goals for specific interventions  10/16/2022 2336 by Svetlana Hung RN  Outcome: Progressing  10/16/2022 2335 by Svetlana Hung RN  Outcome: Progressing

## 2022-10-17 NOTE — PROGRESS NOTES
[facility] Dosing Service    Follow-up Pharmacokinetic Consult for Vancomycin Dosing     Yessica Jade is a 67year old patient who is being treated for cellulitis. Patient is on day 3 of vancomycin and is currently receiving 2000 mg Q 12 hours. Labs:  Lab Results   Component Value Date    CREATSERUM 1.10 10/16/2022      CrCl:  Estimated Creatinine Clearance: 52.8 mL/min (based on SCr of 1.1 mg/dL). Levels:  trough: 22.6 mcg/mL    Based on the above:    1. Hold Vancomycin for now based on pharmacokinetics and renal function. 2.  Will re-check vancomycin random level(s) 24 hours from the last dose . Goal trough is 10-15 mcg/mL unless otherwise noted by ordering provider. 3.  Pharmacy will order SCr as clinically indicated while on vancomycin to assess renal function. 4. Pharmacy will follow and monitor renal function, toxicity and efficacy. We appreciate the opportunity to assist in the care of this patient.     Chichi Branch, [facility]  10/17/2022  2:07 AM  [address] [place] [address] Extension: [phone]

## 2022-10-18 ENCOUNTER — INITIAL APN SNF VISIT (OUTPATIENT)
Dept: INTERNAL MEDICINE CLINIC | Age: 72
End: 2022-10-18

## 2022-10-18 DIAGNOSIS — E11.00 TYPE 2 DIABETES MELLITUS WITH HYPEROSMOLARITY WITHOUT COMA, WITHOUT LONG-TERM CURRENT USE OF INSULIN (HCC): ICD-10-CM

## 2022-10-18 DIAGNOSIS — I10 HYPERTENSION, UNSPECIFIED TYPE: ICD-10-CM

## 2022-10-18 DIAGNOSIS — Z79.2 RECEIVING INTRAVENOUS ANTIBIOTIC TREATMENT AS OUTPATIENT: ICD-10-CM

## 2022-10-18 DIAGNOSIS — L03.90 RECURRENT CELLULITIS: Primary | ICD-10-CM

## 2022-10-18 DIAGNOSIS — Z79.899 MEDICATION MANAGEMENT: ICD-10-CM

## 2022-10-18 DIAGNOSIS — E29.1 HYPOGONADISM MALE: ICD-10-CM

## 2022-10-18 PROCEDURE — 1111F DSCHRG MED/CURRENT MED MERGE: CPT | Performed by: NURSE PRACTITIONER

## 2022-10-18 PROCEDURE — 99310 SBSQ NF CARE HIGH MDM 45: CPT | Performed by: NURSE PRACTITIONER

## 2022-10-19 VITALS
DIASTOLIC BLOOD PRESSURE: 82 MMHG | SYSTOLIC BLOOD PRESSURE: 128 MMHG | BODY MASS INDEX: 43 KG/M2 | TEMPERATURE: 97 F | OXYGEN SATURATION: 95 % | HEART RATE: 92 BPM | WEIGHT: 260.88 LBS | RESPIRATION RATE: 18 BRPM

## 2022-10-20 ENCOUNTER — SNF VISIT (OUTPATIENT)
Dept: INTERNAL MEDICINE CLINIC | Age: 72
End: 2022-10-20

## 2022-10-20 VITALS
RESPIRATION RATE: 20 BRPM | HEART RATE: 81 BPM | SYSTOLIC BLOOD PRESSURE: 141 MMHG | OXYGEN SATURATION: 97 % | DIASTOLIC BLOOD PRESSURE: 88 MMHG | TEMPERATURE: 98 F

## 2022-10-20 DIAGNOSIS — M79.602 PAIN AND SWELLING OF LEFT UPPER EXTREMITY: ICD-10-CM

## 2022-10-20 DIAGNOSIS — E11.00 TYPE 2 DIABETES MELLITUS WITH HYPEROSMOLARITY WITHOUT COMA, WITHOUT LONG-TERM CURRENT USE OF INSULIN (HCC): ICD-10-CM

## 2022-10-20 DIAGNOSIS — Z79.2 RECEIVING INTRAVENOUS ANTIBIOTIC TREATMENT AS OUTPATIENT: ICD-10-CM

## 2022-10-20 DIAGNOSIS — Z79.899 MEDICATION MANAGEMENT: ICD-10-CM

## 2022-10-20 DIAGNOSIS — M79.89 PAIN AND SWELLING OF LEFT UPPER EXTREMITY: ICD-10-CM

## 2022-10-20 DIAGNOSIS — L03.90 RECURRENT CELLULITIS: Primary | ICD-10-CM

## 2022-10-20 PROCEDURE — 99309 SBSQ NF CARE MODERATE MDM 30: CPT | Performed by: NURSE PRACTITIONER

## 2022-10-20 PROCEDURE — 1111F DSCHRG MED/CURRENT MED MERGE: CPT | Performed by: NURSE PRACTITIONER

## 2022-11-01 ENCOUNTER — SNF DISCHARGE (OUTPATIENT)
Dept: INTERNAL MEDICINE CLINIC | Age: 72
End: 2022-11-01

## 2022-11-01 VITALS
RESPIRATION RATE: 20 BRPM | SYSTOLIC BLOOD PRESSURE: 126 MMHG | TEMPERATURE: 97 F | OXYGEN SATURATION: 97 % | HEART RATE: 61 BPM | DIASTOLIC BLOOD PRESSURE: 69 MMHG

## 2022-11-01 DIAGNOSIS — M79.602 PAIN AND SWELLING OF LEFT UPPER EXTREMITY: ICD-10-CM

## 2022-11-01 DIAGNOSIS — L03.90 RECURRENT CELLULITIS: Primary | ICD-10-CM

## 2022-11-01 DIAGNOSIS — Z79.899 MEDICATION MANAGEMENT: ICD-10-CM

## 2022-11-01 DIAGNOSIS — Z79.2 RECEIVING INTRAVENOUS ANTIBIOTIC TREATMENT AS OUTPATIENT: ICD-10-CM

## 2022-11-01 DIAGNOSIS — M79.89 PAIN AND SWELLING OF LEFT UPPER EXTREMITY: ICD-10-CM

## 2022-11-01 PROCEDURE — 1111F DSCHRG MED/CURRENT MED MERGE: CPT | Performed by: NURSE PRACTITIONER

## 2022-11-01 PROCEDURE — 99316 NF DSCHRG MGMT 30 MIN+: CPT | Performed by: NURSE PRACTITIONER

## 2022-11-08 ENCOUNTER — SNF VISIT (OUTPATIENT)
Dept: INTERNAL MEDICINE CLINIC | Age: 72
End: 2022-11-08

## 2022-11-08 VITALS
HEART RATE: 71 BPM | SYSTOLIC BLOOD PRESSURE: 128 MMHG | RESPIRATION RATE: 20 BRPM | OXYGEN SATURATION: 97 % | TEMPERATURE: 98 F | DIASTOLIC BLOOD PRESSURE: 72 MMHG

## 2022-11-08 DIAGNOSIS — E11.00 TYPE 2 DIABETES MELLITUS WITH HYPEROSMOLARITY WITHOUT COMA, WITHOUT LONG-TERM CURRENT USE OF INSULIN (HCC): ICD-10-CM

## 2022-11-08 DIAGNOSIS — L03.90 RECURRENT CELLULITIS: Primary | ICD-10-CM

## 2022-11-08 DIAGNOSIS — F41.1 GENERALIZED ANXIETY DISORDER: ICD-10-CM

## 2022-11-08 DIAGNOSIS — Z79.899 MEDICATION MANAGEMENT: ICD-10-CM

## 2022-11-08 DIAGNOSIS — Z79.2 RECEIVING INTRAVENOUS ANTIBIOTIC TREATMENT AS OUTPATIENT: ICD-10-CM

## 2022-11-08 PROCEDURE — 1111F DSCHRG MED/CURRENT MED MERGE: CPT | Performed by: NURSE PRACTITIONER

## 2022-11-08 PROCEDURE — 99309 SBSQ NF CARE MODERATE MDM 30: CPT | Performed by: NURSE PRACTITIONER

## 2022-11-08 RX ORDER — PIPERACILLIN SODIUM, TAZOBACTAM SODIUM 4; .5 G/20ML; G/20ML
4.5 INJECTION, POWDER, LYOPHILIZED, FOR SOLUTION INTRAVENOUS
COMMUNITY
Start: 2022-11-02 | End: 2022-11-12

## 2022-11-15 ENCOUNTER — SNF DISCHARGE (OUTPATIENT)
Dept: INTERNAL MEDICINE CLINIC | Age: 72
End: 2022-11-15

## 2022-11-15 VITALS
BODY MASS INDEX: 43 KG/M2 | SYSTOLIC BLOOD PRESSURE: 135 MMHG | DIASTOLIC BLOOD PRESSURE: 89 MMHG | TEMPERATURE: 97 F | RESPIRATION RATE: 20 BRPM | WEIGHT: 258.19 LBS | HEART RATE: 95 BPM | OXYGEN SATURATION: 95 %

## 2022-11-15 DIAGNOSIS — Z79.2 RECEIVING INTRAVENOUS ANTIBIOTIC TREATMENT AS OUTPATIENT: ICD-10-CM

## 2022-11-15 DIAGNOSIS — L03.90 RECURRENT CELLULITIS: Primary | ICD-10-CM

## 2022-11-15 DIAGNOSIS — F41.1 GENERALIZED ANXIETY DISORDER: ICD-10-CM

## 2022-11-15 DIAGNOSIS — M79.89 PAIN AND SWELLING OF LEFT UPPER EXTREMITY: ICD-10-CM

## 2022-11-15 DIAGNOSIS — E11.9 CONTROLLED TYPE 2 DIABETES MELLITUS WITHOUT COMPLICATION, WITHOUT LONG-TERM CURRENT USE OF INSULIN (HCC): ICD-10-CM

## 2022-11-15 DIAGNOSIS — M79.602 PAIN AND SWELLING OF LEFT UPPER EXTREMITY: ICD-10-CM

## 2022-11-15 DIAGNOSIS — Z79.899 MEDICATION MANAGEMENT: ICD-10-CM

## 2022-11-15 PROCEDURE — 99316 NF DSCHRG MGMT 30 MIN+: CPT | Performed by: NURSE PRACTITIONER

## 2022-11-15 PROCEDURE — 1111F DSCHRG MED/CURRENT MED MERGE: CPT | Performed by: NURSE PRACTITIONER

## 2022-11-19 ENCOUNTER — HOSPITAL ENCOUNTER (OUTPATIENT)
Age: 72
Discharge: HOME | End: 2022-11-19
Payer: MEDICARE

## 2022-11-19 VITALS
HEART RATE: 62 BPM | OXYGEN SATURATION: 96 % | TEMPERATURE: 98 F | RESPIRATION RATE: 18 BRPM | DIASTOLIC BLOOD PRESSURE: 61 MMHG | SYSTOLIC BLOOD PRESSURE: 128 MMHG

## 2022-11-19 DIAGNOSIS — Z48.00 CHANGE OF DRESSING: Primary | ICD-10-CM

## 2022-11-19 PROCEDURE — 99212 OFFICE O/P EST SF 10 MIN: CPT | Performed by: NURSE PRACTITIONER

## 2022-11-19 NOTE — ED INITIAL ASSESSMENT (HPI)
Patient reports he has PICC line that was placed at [facility] 1 week ago. Reports he is due for a dressing change and flush. Was not arranged outpatient. Reports it is due to come out at ID appointment on Monday. Denies any issues with the PICC line. States it has been flushing well and he has not noticed any redness/drainage or signs of infection.

## 2024-03-11 ENCOUNTER — HOSPITAL ENCOUNTER (EMERGENCY)
Facility: HOSPITAL | Age: 74
Discharge: HOME | End: 2024-03-12
Attending: EMERGENCY MEDICINE
Payer: MEDICARE

## 2024-03-11 ENCOUNTER — APPOINTMENT (OUTPATIENT)
Dept: GENERAL RADIOLOGY | Facility: HOSPITAL | Age: 74
End: 2024-03-11
Payer: MEDICARE

## 2024-03-11 ENCOUNTER — APPOINTMENT (OUTPATIENT)
Dept: CT IMAGING | Facility: HOSPITAL | Age: 74
End: 2024-03-11
Attending: EMERGENCY MEDICINE
Payer: MEDICARE

## 2024-03-11 DIAGNOSIS — J90 PLEURAL EFFUSION: ICD-10-CM

## 2024-03-11 DIAGNOSIS — R07.81 RIB PAIN ON LEFT SIDE: Primary | ICD-10-CM

## 2024-03-11 DIAGNOSIS — J98.11 ATELECTASIS: ICD-10-CM

## 2024-03-11 LAB
ALBUMIN SERPL-MCNC: 3.4 G/DL (ref 3.4–5)
ALBUMIN/GLOB SERPL: 0.9 {RATIO} (ref 1–2)
ALP LIVER SERPL-CCNC: 79 U/L
ALT SERPL-CCNC: 32 U/L
ANION GAP SERPL CALC-SCNC: 9 MMOL/L (ref 0–18)
AST SERPL-CCNC: 26 U/L (ref 15–37)
BASOPHILS # BLD AUTO: 0.03 X10(3) UL (ref 0–0.2)
BASOPHILS NFR BLD AUTO: 0.4 %
BILIRUB SERPL-MCNC: 0.7 MG/DL (ref 0.1–2)
BUN BLD-MCNC: 20 MG/DL (ref 9–23)
CALCIUM BLD-MCNC: 9.4 MG/DL (ref 8.5–10.1)
CHLORIDE SERPL-SCNC: 105 MMOL/L (ref 98–112)
CO2 SERPL-SCNC: 22 MMOL/L (ref 21–32)
CREAT BLD-MCNC: 0.81 MG/DL
D DIMER PPP FEU-MCNC: 1.81 UG/ML FEU (ref ?–0.74)
EGFRCR SERPLBLD CKD-EPI 2021: 93 ML/MIN/1.73M2 (ref 60–?)
EOSINOPHIL # BLD AUTO: 0.29 X10(3) UL (ref 0–0.7)
EOSINOPHIL NFR BLD AUTO: 3.4 %
ERYTHROCYTE [DISTWIDTH] IN BLOOD BY AUTOMATED COUNT: 14 %
FLUAV + FLUBV RNA SPEC NAA+PROBE: NEGATIVE
FLUAV + FLUBV RNA SPEC NAA+PROBE: NEGATIVE
GLOBULIN PLAS-MCNC: 3.9 G/DL (ref 2.8–4.4)
GLUCOSE BLD-MCNC: 116 MG/DL (ref 70–99)
HCT VFR BLD AUTO: 40.6 %
HGB BLD-MCNC: 14 G/DL
IMM GRANULOCYTES # BLD AUTO: 0.06 X10(3) UL (ref 0–1)
IMM GRANULOCYTES NFR BLD: 0.7 %
LYMPHOCYTES # BLD AUTO: 0.93 X10(3) UL (ref 1–4)
LYMPHOCYTES NFR BLD AUTO: 10.9 %
MCH RBC QN AUTO: 30 PG (ref 26–34)
MCHC RBC AUTO-ENTMCNC: 34.5 G/DL (ref 31–37)
MCV RBC AUTO: 86.9 FL
MONOCYTES # BLD AUTO: 0.68 X10(3) UL (ref 0.1–1)
MONOCYTES NFR BLD AUTO: 8 %
NEUTROPHILS # BLD AUTO: 6.52 X10 (3) UL (ref 1.5–7.7)
NEUTROPHILS # BLD AUTO: 6.52 X10(3) UL (ref 1.5–7.7)
NEUTROPHILS NFR BLD AUTO: 76.6 %
OSMOLALITY SERPL CALC.SUM OF ELEC: 286 MOSM/KG (ref 275–295)
PLATELET # BLD AUTO: 210 10(3)UL (ref 150–450)
POTASSIUM SERPL-SCNC: 4 MMOL/L (ref 3.5–5.1)
PROT SERPL-MCNC: 7.3 G/DL (ref 6.4–8.2)
RBC # BLD AUTO: 4.67 X10(6)UL
RSV RNA SPEC NAA+PROBE: NEGATIVE
SARS-COV-2 RNA RESP QL NAA+PROBE: NOT DETECTED
SODIUM SERPL-SCNC: 136 MMOL/L (ref 136–145)
TROPONIN I SERPL HS-MCNC: 7 NG/L
WBC # BLD AUTO: 8.5 X10(3) UL (ref 4–11)

## 2024-03-11 PROCEDURE — 71045 X-RAY EXAM CHEST 1 VIEW: CPT

## 2024-03-11 PROCEDURE — 0241U SARS-COV-2/FLU A AND B/RSV BY PCR (GENEXPERT): CPT | Performed by: EMERGENCY MEDICINE

## 2024-03-11 PROCEDURE — 0241U SARS-COV-2/FLU A AND B/RSV BY PCR (GENEXPERT): CPT

## 2024-03-11 PROCEDURE — 99285 EMERGENCY DEPT VISIT HI MDM: CPT

## 2024-03-11 PROCEDURE — 93005 ELECTROCARDIOGRAM TRACING: CPT

## 2024-03-11 PROCEDURE — 80053 COMPREHEN METABOLIC PANEL: CPT

## 2024-03-11 PROCEDURE — 96374 THER/PROPH/DIAG INJ IV PUSH: CPT

## 2024-03-11 PROCEDURE — 99284 EMERGENCY DEPT VISIT MOD MDM: CPT

## 2024-03-11 PROCEDURE — 71275 CT ANGIOGRAPHY CHEST: CPT | Performed by: EMERGENCY MEDICINE

## 2024-03-11 PROCEDURE — 85025 COMPLETE CBC W/AUTO DIFF WBC: CPT | Performed by: EMERGENCY MEDICINE

## 2024-03-11 PROCEDURE — 85379 FIBRIN DEGRADATION QUANT: CPT | Performed by: EMERGENCY MEDICINE

## 2024-03-11 PROCEDURE — 85025 COMPLETE CBC W/AUTO DIFF WBC: CPT

## 2024-03-11 PROCEDURE — 80053 COMPREHEN METABOLIC PANEL: CPT | Performed by: EMERGENCY MEDICINE

## 2024-03-11 PROCEDURE — 93010 ELECTROCARDIOGRAM REPORT: CPT

## 2024-03-11 PROCEDURE — 84484 ASSAY OF TROPONIN QUANT: CPT | Performed by: EMERGENCY MEDICINE

## 2024-03-11 RX ORDER — IOHEXOL 350 MG/ML
90 INJECTION, SOLUTION INTRAVENOUS
Status: COMPLETED | OUTPATIENT
Start: 2024-03-11 | End: 2024-03-11

## 2024-03-11 RX ORDER — LIDOCAINE 50 MG/G
1 PATCH TOPICAL EVERY 24 HOURS
Qty: 30 PATCH | Refills: 0 | Status: SHIPPED | OUTPATIENT
Start: 2024-03-11

## 2024-03-12 VITALS
TEMPERATURE: 98 F | BODY MASS INDEX: 43 KG/M2 | DIASTOLIC BLOOD PRESSURE: 52 MMHG | RESPIRATION RATE: 18 BRPM | SYSTOLIC BLOOD PRESSURE: 119 MMHG | WEIGHT: 257.94 LBS | HEART RATE: 88 BPM | OXYGEN SATURATION: 98 %

## 2024-03-12 RX ORDER — FUROSEMIDE 10 MG/ML
40 INJECTION INTRAMUSCULAR; INTRAVENOUS ONCE
Status: COMPLETED | OUTPATIENT
Start: 2024-03-12 | End: 2024-03-12

## 2024-03-12 NOTE — ED INITIAL ASSESSMENT (HPI)
BENJY. Pt has extensive hx per pt. Pt is difficult to get information out of. Pt has left chest wall pain specifically.
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
## 2024-03-13 LAB
ATRIAL RATE: 86 BPM
P AXIS: 39 DEGREES
P-R INTERVAL: 164 MS
Q-T INTERVAL: 344 MS
QRS DURATION: 76 MS
QTC CALCULATION (BEZET): 411 MS
R AXIS: -10 DEGREES
T AXIS: 25 DEGREES
VENTRICULAR RATE: 86 BPM

## 2024-07-30 NOTE — PROGRESS NOTES
[facility] Hospitalist Progress Note     PCP: Joyce Spencer MD    CC:  Follow up    SUBJECTIVE:  Pt laying in bed. Still some abd pain. Dec u/o. Reports taking in fluid/PO well. Walking.      OBJECTIVE:  Temp:  [98 °F (36.7 °C)-98.9 °F (37.2 °C)] 98 ° 120*       Meds:     • pancrelipase (Lip-Prot-Amyl)  40,000 Units Oral TID CC   • dicyclomine  20 mg Oral TID AC&HS   • lisinopril  20 mg Oral Nightly   • Pantoprazole Sodium  40 mg Oral QAM AC   • furosemide  20 mg Oral Daily   • docusate sodium  100 mg O Incidental finding of fatty liver, renal cysts on MRI/MRCP  - f/u with PCP.  No acute issues     # leukocytosis, elevated again  -afebrile, will follow  -UA clear     # CAD, HTN/HL  -stable, continue home meds     # asthma, GERD  -no acute issues, continue room air

## (undated) DEVICE — 3M™ RED DOT™ MONITORING ELECTRODE WITH FOAM TAPE AND STICKY GEL, 50/BAG, 20/CASE, 72/PLT 2570: Brand: RED DOT™

## (undated) DEVICE — STERILE POLYISOPRENE POWDER-FREE SURGICAL GLOVES: Brand: PROTEXIS

## (undated) DEVICE — ZIMMER® STERILE DISPOSABLE TOURNIQUET CUFF WITH PLC, DUAL PORT, SINGLE BLADDER, 34 IN. (86 CM)

## (undated) DEVICE — ENDOSCOPY PACK - LOWER: Brand: MEDLINE INDUSTRIES, INC.

## (undated) DEVICE — REM POLYHESIVE ADULT PATIENT RETURN ELECTRODE: Brand: VALLEYLAB

## (undated) DEVICE — GAUZE,PACKING STRIP,IODOFORM,1/2"X5YD,ST: Brand: CURAD

## (undated) DEVICE — CURVED, SMALL JAW, OPEN SEALER/DIVIDER: Brand: LIGASURE

## (undated) DEVICE — Device: Brand: DEFENDO AIR/WATER/SUCTION AND BIOPSY VALVE

## (undated) DEVICE — STERILE HOOK LOCK LATEX FREE ELASTIC BANDAGE 6INX5YD: Brand: HOOK LOCK™

## (undated) DEVICE — ENDOSCOPY PACK UPPER: Brand: MEDLINE INDUSTRIES, INC.

## (undated) DEVICE — MINI LAP PACK-LF: Brand: MEDLINE INDUSTRIES, INC.

## (undated) DEVICE — FORCEP BIOPSY RJ4 LG CAP W/ND

## (undated) DEVICE — KENDALL SCD EXPRESS SLEEVES, KNEE LENGTH, MEDIUM: Brand: KENDALL SCD

## (undated) DEVICE — PREMIUM WET SKIN PREP TRAY: Brand: MEDLINE INDUSTRIES, INC.

## (undated) DEVICE — MASK OXYGEN ADULT W/ C02 10FT

## (undated) DEVICE — SUTURE VICRYL 2-0

## (undated) DEVICE — FILTERLINE NASAL ADULT O2/CO2

## (undated) DEVICE — 3M™ STERI-DRAPE™ U-DRAPE 1015: Brand: STERI-DRAPE™

## (undated) DEVICE — CHLORAPREP 26ML APPLICATOR

## (undated) DEVICE — SPECIMEN CONTAINER,POSITIVE SEAL INDICATOR, OR PACKAGED: Brand: PRECISION

## (undated) DEVICE — 1200CC GUARDIAN II: Brand: GUARDIAN

## (undated) DEVICE — FORCEP RADIAL JAW 4

## (undated) DEVICE — GAMMEX® NON-LATEX PI ORTHO SIZE 8.5, STERILE POLYISOPRENE POWDER-FREE SURGICAL GLOVE: Brand: GAMMEX

## (undated) DEVICE — BANDAGE ROLL,100% COTTON, 6 PLY, LARGE: Brand: KERLIX

## (undated) DEVICE — SOL  .9 3000ML

## (undated) DEVICE — SOL  .9 1000ML BTL

## (undated) DEVICE — FAN SPRAY KIT: Brand: PULSAVAC®

## (undated) DEVICE — LOWER EXTREMITY CDS-LF: Brand: MEDLINE INDUSTRIES, INC.

## (undated) DEVICE — UNDYED BRAIDED (POLYGLACTIN 910), SYNTHETIC ABSORBABLE SUTURE: Brand: COATED VICRYL

## (undated) NOTE — IP AVS SNAPSHOT
[address]            (For Outpatient Use Only) Initial Admit Date: 2022   Inpt/Obs Admit Date: Inpt: 22 / Obs: N/A   Discharge Date:    Мария Red:  [de-identified]   MRN: [de-identified]   CSN: [id number]   CEID: [id number]        ENCOUNTER  Patient Class: Inpatient Admitting Provider: Rupesh Ordoñez MD Unit: [address] [facility] 3SW-A   Hospital Service: Medical Attending Provider: Julio Navas MD   Bed: 373-A   Visit Type:   Referring Physician: No ref. provider found Billing Flag:    Admit Diagnosis: Lymphedema [I89.0]      PATIENT  Legal Name:   [facility]   Legal Sex: Male  Gender ID:              [address] [facility],[address] Floor Name:    PCP:  Paola Cisneros DO Home: [phone]   Address:  [address] [place] [address] : 1/3/1950 (72 yrs) Mobile: [phone]         City/[facility]: [facility] [address] Marital:  Language: Yessica Schmitz SSN4: [id number] Sikhism: Wishes Privacy     Race: White Ethnicity: Non  Or [address] [place] [address]   Name Relationship Legal Guardian? Home Phone Work Phone Mobile Phone   1.  Judie Wally  2. *No Contact Specified* Spouse      [phone]       GUARANTOR  GuarantorBhyun Wells : 1/3/1950 Home Phone: [phone]   Address: [address] [place] [address]  Sex: Male Work Phone:    City/[facility] Anat Puckett   Rel. to Patient: Self Guarantor ID: [id number]   GUARANTOR EMPLOYER   Employer:  Status: RETIRED     COVERAGE  PRIMARY INSURANCE   Payor: MEDICARE Plan: MEDICARE PART A&B   Group Number:  Insurance Type: INDEMNITY   Subscriber Name: Samir Coates : 1950   Subscriber ID: [id number] Pt Rel to Subscriber: Self   SECONDARY INSURANCE   Payor: [place] Plan: Kassidy Scott   Group Number: [id number] Insurance Type: [id number] Name: Samir Coates : 1/3/1950   Subscriber ID: [id number] Pt Rel to Subscriber: SELF   TERTIARY INSURANCE   Payor:  Plan:    Group Number:  Insurance Type: Subscriber Name:  Luz Maria Diaz :    Subscriber ID:  Pt Rel to Subscriber:    Hospital Account Financial Class: Medicare    2022

## (undated) NOTE — LETTER
[address] [facility]     I agree to have a Peripherally Inserted Central Catheter (PICC) placed in my arm.    1. The PICC insertion procedure, care, maintenance, risks, benefits, and complications have been explained to me by my physic benefits, and side effects related to the alternatives and risks related to not receiving this procedure. 8.  I have expressed any questions about this procedure to my physician or the PICC Proceduralist and he/she has answered them.   I certify that I h

## (undated) NOTE — LETTER
Chiquis Chan [address] [address]  [place], [address] [place] [address]    Consent for Operation  Date: __________________                                Time: _______________    1.  I authorize the performance upon Almeta Enter the following operation:  Procedure(s) 6. I consent to the photographing or videotaping of the operations or procedures to be performed, including appropriate portions of my body for medical, scientific, or educational purposes, provided my identity is not revealed by the pictures or by descrip 10. If I have a Do Not Resuscitate order in place, the surgeon and I (or the individual authorized to consent on my behalf) will discuss and agree as to whether the Do Not Resuscitate order will remain in effect or will be discontinued during the performan a. Allow the anesthesiologist (anesthesia doctor) to give me medicine and do additional procedures as necessary.  Some examples are: Starting or using an “IV” to give me medicine, fluids or blood during my procedure, and having a breathing tube placed to he 7. Regional Anesthesia (“spinal”, “epidural”, & “nerve blocks”): I understand that rare but potential complications include headache, bleeding, infection, seizure, irregular heart rhythms, and nerve injury.     I can change my mind about having anesthesia

## (undated) NOTE — IP AVS SNAPSHOT
[address]            (For Outpatient Use Only) Initial Admit Date: 10/13/2022   Inpt/Obs Admit Date: Inpt: 10/13/22 / Obs: N/A   Discharge Date:    Hospital Acct:  [de-identified]   MRN: [de-identified]   CSN: [id number]   CEID: [id number]        ENCOUNTER  Patient Class: Inpatient Admitting Provider: Jeremias Ley MD [facility] Service: Surgical Attending Provider: Jose Martin Asher MD   Bed: 511-A   Visit Type:   Referring Physician: No ref. provider found Billing Flag:    Admit Diagnosis: BLE cellulitis. PATIENT  Legal Name:   Mireille Seat   Legal Sex: Male  Gender ID:              [address] [facility],[address] Floor Name:    PCP:  Maria Guadalupe Stark DO Home: [phone]   Address:  [address] [place] [address] : 1/3/1950 (72 yrs) Mobile: [phone]         City/[facility]: [employer] [address] Marital:  Language: [address] Nuris SSN4: [id number] Catholic: Wishes Privacy     Race: White Ethnicity: Non  Or [address] [facility] / [facility]   Name Relationship Legal Guardian? Home Phone Work Phone Mobile Phone   1.  Celeste Power  2. *No Contact Specified* Spouse      [phone]       GUARANTOR  GuarantorStepallavi Schaefer : 1/3/1950 Home Phone: [phone]   Address: [address] [place] [address]  Sex: Male Work Phone:    City/[facility], [facility] W. Chandana Cruz   Rel. to Patient: Self Guarantor ID: [id number]   GUARANTOR EMPLOYER   Employer:  Status: RETIRED     COVERAGE  PRIMARY INSURANCE   Payor: MEDICARE Plan: MEDICARE PART A&B   Group Number:  Insurance Type: INDEMNITY   Subscriber Name: Chastity Rojas : 1950   Subscriber ID: [id number] Pt Rel to Subscriber: Self   SECONDARY INSURANCE   Payor: [employer] [place] IN[place]MN[place] Plan: [address] [place] [address]   Group Number: [id number] Insurance Type: [address] Name: Chastity Rojas : 1/3/1950   Subscriber ID: [id number] Pt Rel to Subscriber: SELF   TERTIARY INSURANCE   Payor:  Plan:    Group Number:  Insurance Type:    Subscriber Name:  Whit Parr :    Subscriber ID:  Pt Rel to Subscriber:    Hospital Account Financial Class: Medicare    2022

## (undated) NOTE — LETTER
Chiquis Chan 182 6 13[facility] E  [place], [address] [facility]    Consent for Operation  Date: __________________                                Time: _______________    1.  I authorize the performance upon Dois Ross the following operation:  Procedure(s) 6. I consent to the photographing or videotaping of the operations or procedures to be performed, including appropriate portions of my body for medical, scientific, or educational purposes, provided my identity is not revealed by the pictures or by descrip 10. If I have a Do Not Resuscitate order in place, the surgeon and I (or the individual authorized to consent on my behalf) will discuss and agree as to whether the Do Not Resuscitate order will remain in effect or will be discontinued during the performan a. Allow the anesthesiologist (anesthesia doctor) to give me medicine and do additional procedures as necessary.  Some examples are: Starting or using an “IV” to give me medicine, fluids or blood during my procedure, and having a breathing tube placed to he 7. Regional Anesthesia (“spinal”, “epidural”, & “nerve blocks”): I understand that rare but potential complications include headache, bleeding, infection, seizure, irregular heart rhythms, and nerve injury.     I can change my mind about having anesthesia

## (undated) NOTE — LETTER
[facility] Testing Department  Phone: [phone]  OUTSIDE TESTING RESULT REQUEST      TO:   Dr. Leigh Diamond Today's Date: 9/20/19    FAX #: [phone]     IMPORTANT: FOR YOUR IMMEDIATE ATTENTION  Please FAX all test results listed below to

## (undated) NOTE — LETTER
Chiquis Chan 182 6 13[facility] E  [place], [address] [facility]    Consent for Operation  Date: __________________                                Time: _______________    1.  I authorize the performance upon Som Dana the following operation:  Procedure(s) procedure has been videotaped, the surgeon will obtain the original videotape. The hospital will not be responsible for storage or maintenance of this tape.   7. For the purpose of advancing medical education, I consent to the admittance of observers to the STATEMENTS REQUIRING INSERTION OR COMPLETION WERE FILLED IN.     Signature of Patient:   ___________________________    When the patient is a minor or mentally incompetent to give consent:  Signature of person authorized to consent for patient: ____________ drugs/illegal medications). Failure to inform my anesthesiologist about these medicines may increase my risk of anesthetic complications. iv. If I am allergic to anything or have had a reaction to anesthesia before.   3. I understand how the anesthesia med I have discussed the procedure and information above with the patient (or patient’s representative) and answered their questions. The patient or their representative has agreed to have anesthesia services.     _______________________________________________

## (undated) NOTE — LETTER
Chiquis Chan 182 6 13[facility]  [place], [address] [place] [address]    Consent for Operation  Date: __________________                                Time: _______________    1.  I authorize the performance upon Inna Harris the following operation:  Procedure(s) 5. I consent to the photographing or videotaping of the operations or procedures to be performed, including appropriate portions of my body for medical, scientific, or educational purposes, provided my identity is not revealed by the pictures or by descrip 9. If I have a Do Not Resuscitate order in place, the surgeon and I (or the individual authorized to consent on my behalf) will discuss and agree as to whether the Do Not Resuscitate order will remain in effect or will be discontinued during the performanc

## (undated) NOTE — LETTER
Chiquis Chan 182 6 13[facility]  [place], [address] [facility]    Consent for Operation  Date: __________________                                Time: _______________    1.  I authorize the performance upon Arletta Seats the following operation:  Procedure(s) procedure has been videotaped, the surgeon will obtain the original videotape. The hospital will not be responsible for storage or maintenance of this tape.     6. For the purpose of advancing medical education, I consent to the admittance of observers to t STATEMENTS REQUIRING INSERTION OR COMPLETION WERE FILLED IN.     Signature of Patient:   ___________________________    When the patient is a minor or mentally incompetent to give consent:  Signature of person authorized to consent for patient: ____________